# Patient Record
Sex: MALE | Race: OTHER | HISPANIC OR LATINO
[De-identification: names, ages, dates, MRNs, and addresses within clinical notes are randomized per-mention and may not be internally consistent; named-entity substitution may affect disease eponyms.]

---

## 2017-07-27 PROBLEM — E11.9 IDDM (INSULIN DEPENDENT DIABETES MELLITUS): Status: ACTIVE | Noted: 2017-07-27

## 2017-07-27 PROBLEM — I20.9 ANGINA, CLASS III: Status: ACTIVE | Noted: 2017-07-27

## 2017-08-02 ENCOUNTER — APPOINTMENT (OUTPATIENT)
Dept: CARDIOTHORACIC SURGERY | Facility: CLINIC | Age: 52
End: 2017-08-02
Payer: COMMERCIAL

## 2017-08-02 VITALS
OXYGEN SATURATION: 97 % | HEART RATE: 86 BPM | HEIGHT: 67 IN | WEIGHT: 250 LBS | DIASTOLIC BLOOD PRESSURE: 91 MMHG | RESPIRATION RATE: 14 BRPM | TEMPERATURE: 98.2 F | BODY MASS INDEX: 39.24 KG/M2 | SYSTOLIC BLOOD PRESSURE: 163 MMHG

## 2017-08-02 DIAGNOSIS — E78.5 HYPERLIPIDEMIA, UNSPECIFIED: ICD-10-CM

## 2017-08-02 DIAGNOSIS — R07.9 CHEST PAIN, UNSPECIFIED: ICD-10-CM

## 2017-08-02 DIAGNOSIS — Z79.4 TYPE 2 DIABETES MELLITUS W/OUT COMPLICATIONS: ICD-10-CM

## 2017-08-02 DIAGNOSIS — I20.9 ANGINA PECTORIS, UNSPECIFIED: ICD-10-CM

## 2017-08-02 DIAGNOSIS — E11.9 TYPE 2 DIABETES MELLITUS W/OUT COMPLICATIONS: ICD-10-CM

## 2017-08-02 DIAGNOSIS — E66.01 MORBID (SEVERE) OBESITY DUE TO EXCESS CALORIES: ICD-10-CM

## 2017-08-02 PROCEDURE — 99215 OFFICE O/P EST HI 40 MIN: CPT

## 2017-08-02 PROCEDURE — 94010 BREATHING CAPACITY TEST: CPT

## 2017-08-14 ENCOUNTER — OTHER (OUTPATIENT)
Age: 52
End: 2017-08-14

## 2017-08-18 ENCOUNTER — APPOINTMENT (OUTPATIENT)
Dept: CV DIAGNOSITCS | Facility: HOSPITAL | Age: 52
End: 2017-08-18

## 2017-08-18 ENCOUNTER — INPATIENT (INPATIENT)
Facility: HOSPITAL | Age: 52
LOS: 0 days | Discharge: LEFT BEFORE DISCHARGE | DRG: 287 | End: 2017-08-18
Attending: INTERNAL MEDICINE | Admitting: INTERNAL MEDICINE
Payer: MEDICAID

## 2017-08-18 VITALS
OXYGEN SATURATION: 97 % | HEART RATE: 96 BPM | HEIGHT: 67 IN | RESPIRATION RATE: 18 BRPM | SYSTOLIC BLOOD PRESSURE: 171 MMHG | DIASTOLIC BLOOD PRESSURE: 79 MMHG | WEIGHT: 250 LBS | TEMPERATURE: 100 F

## 2017-08-18 DIAGNOSIS — Z95.5 PRESENCE OF CORONARY ANGIOPLASTY IMPLANT AND GRAFT: Chronic | ICD-10-CM

## 2017-08-18 DIAGNOSIS — S42.309A UNSPECIFIED FRACTURE OF SHAFT OF HUMERUS, UNSPECIFIED ARM, INITIAL ENCOUNTER FOR CLOSED FRACTURE: Chronic | ICD-10-CM

## 2017-08-18 DIAGNOSIS — I25.10 ATHEROSCLEROTIC HEART DISEASE OF NATIVE CORONARY ARTERY WITHOUT ANGINA PECTORIS: ICD-10-CM

## 2017-08-18 LAB
ALBUMIN SERPL ELPH-MCNC: 4 G/DL — SIGNIFICANT CHANGE UP (ref 3.3–5)
ALP SERPL-CCNC: 136 U/L — HIGH (ref 40–120)
ALT FLD-CCNC: 19 U/L RC — SIGNIFICANT CHANGE UP (ref 10–45)
ANION GAP SERPL CALC-SCNC: 14 MMOL/L — SIGNIFICANT CHANGE UP (ref 5–17)
AST SERPL-CCNC: 12 U/L — SIGNIFICANT CHANGE UP (ref 10–40)
BILIRUB SERPL-MCNC: 0.3 MG/DL — SIGNIFICANT CHANGE UP (ref 0.2–1.2)
BUN SERPL-MCNC: 11 MG/DL — SIGNIFICANT CHANGE UP (ref 7–23)
CALCIUM SERPL-MCNC: 9.3 MG/DL — SIGNIFICANT CHANGE UP (ref 8.4–10.5)
CHLORIDE SERPL-SCNC: 93 MMOL/L — LOW (ref 96–108)
CO2 SERPL-SCNC: 25 MMOL/L — SIGNIFICANT CHANGE UP (ref 22–31)
CREAT SERPL-MCNC: 0.84 MG/DL — SIGNIFICANT CHANGE UP (ref 0.5–1.3)
GLUCOSE SERPL-MCNC: 505 MG/DL — CRITICAL HIGH (ref 70–99)
HBA1C BLD-MCNC: 12.2 % — HIGH (ref 4–5.6)
HCT VFR BLD CALC: 45 % — SIGNIFICANT CHANGE UP (ref 39–50)
HGB BLD-MCNC: 14.8 G/DL — SIGNIFICANT CHANGE UP (ref 13–17)
MCHC RBC-ENTMCNC: 29.7 PG — SIGNIFICANT CHANGE UP (ref 27–34)
MCHC RBC-ENTMCNC: 32.9 GM/DL — SIGNIFICANT CHANGE UP (ref 32–36)
MCV RBC AUTO: 90.3 FL — SIGNIFICANT CHANGE UP (ref 80–100)
PLATELET # BLD AUTO: 318 K/UL — SIGNIFICANT CHANGE UP (ref 150–400)
POTASSIUM SERPL-MCNC: 4.4 MMOL/L — SIGNIFICANT CHANGE UP (ref 3.5–5.3)
POTASSIUM SERPL-SCNC: 4.4 MMOL/L — SIGNIFICANT CHANGE UP (ref 3.5–5.3)
PROT SERPL-MCNC: 7.4 G/DL — SIGNIFICANT CHANGE UP (ref 6–8.3)
RBC # BLD: 4.98 M/UL — SIGNIFICANT CHANGE UP (ref 4.2–5.8)
RBC # FLD: 11.8 % — SIGNIFICANT CHANGE UP (ref 10.3–14.5)
SODIUM SERPL-SCNC: 132 MMOL/L — LOW (ref 135–145)
WBC # BLD: 12.6 K/UL — HIGH (ref 3.8–10.5)
WBC # FLD AUTO: 12.6 K/UL — HIGH (ref 3.8–10.5)

## 2017-08-18 PROCEDURE — 99152 MOD SED SAME PHYS/QHP 5/>YRS: CPT

## 2017-08-18 PROCEDURE — 93306 TTE W/DOPPLER COMPLETE: CPT | Mod: 26

## 2017-08-18 PROCEDURE — 93460 R&L HRT ART/VENTRICLE ANGIO: CPT | Mod: 26,GC

## 2017-08-18 PROCEDURE — 93010 ELECTROCARDIOGRAM REPORT: CPT

## 2017-08-18 RX ORDER — INSULIN LISPRO 100/ML
VIAL (ML) SUBCUTANEOUS
Qty: 0 | Refills: 0 | Status: DISCONTINUED | OUTPATIENT
Start: 2017-08-18 | End: 2017-08-18

## 2017-08-18 RX ORDER — DEXTROSE 50 % IN WATER 50 %
25 SYRINGE (ML) INTRAVENOUS ONCE
Qty: 0 | Refills: 0 | Status: DISCONTINUED | OUTPATIENT
Start: 2017-08-18 | End: 2017-08-18

## 2017-08-18 RX ORDER — INSULIN LISPRO 100/ML
VIAL (ML) SUBCUTANEOUS AT BEDTIME
Qty: 0 | Refills: 0 | Status: DISCONTINUED | OUTPATIENT
Start: 2017-08-18 | End: 2017-08-18

## 2017-08-18 RX ORDER — DEXTROSE 50 % IN WATER 50 %
12.5 SYRINGE (ML) INTRAVENOUS ONCE
Qty: 0 | Refills: 0 | Status: DISCONTINUED | OUTPATIENT
Start: 2017-08-18 | End: 2017-08-18

## 2017-08-18 RX ORDER — METFORMIN HYDROCHLORIDE 850 MG/1
1 TABLET ORAL
Qty: 0 | Refills: 0 | COMMUNITY

## 2017-08-18 RX ORDER — GLUCAGON INJECTION, SOLUTION 0.5 MG/.1ML
1 INJECTION, SOLUTION SUBCUTANEOUS ONCE
Qty: 0 | Refills: 0 | Status: DISCONTINUED | OUTPATIENT
Start: 2017-08-18 | End: 2017-08-18

## 2017-08-18 RX ORDER — SODIUM CHLORIDE 9 MG/ML
1000 INJECTION, SOLUTION INTRAVENOUS
Qty: 0 | Refills: 0 | Status: DISCONTINUED | OUTPATIENT
Start: 2017-08-18 | End: 2017-08-18

## 2017-08-18 RX ORDER — METOPROLOL TARTRATE 50 MG
50 TABLET ORAL DAILY
Qty: 0 | Refills: 0 | Status: DISCONTINUED | OUTPATIENT
Start: 2017-08-18 | End: 2017-08-18

## 2017-08-18 RX ORDER — DEXTROSE 50 % IN WATER 50 %
1 SYRINGE (ML) INTRAVENOUS ONCE
Qty: 0 | Refills: 0 | Status: DISCONTINUED | OUTPATIENT
Start: 2017-08-18 | End: 2017-08-18

## 2017-08-18 RX ORDER — INSULIN LISPRO 100/ML
5 VIAL (ML) SUBCUTANEOUS
Qty: 0 | Refills: 0 | Status: DISCONTINUED | OUTPATIENT
Start: 2017-08-18 | End: 2017-08-18

## 2017-08-18 RX ORDER — INSULIN GLARGINE 100 [IU]/ML
15 INJECTION, SOLUTION SUBCUTANEOUS AT BEDTIME
Qty: 0 | Refills: 0 | Status: DISCONTINUED | OUTPATIENT
Start: 2017-08-18 | End: 2017-08-18

## 2017-08-18 RX ORDER — SODIUM CHLORIDE 9 MG/ML
3 INJECTION INTRAMUSCULAR; INTRAVENOUS; SUBCUTANEOUS EVERY 8 HOURS
Qty: 0 | Refills: 0 | Status: DISCONTINUED | OUTPATIENT
Start: 2017-08-18 | End: 2017-08-18

## 2017-08-18 RX ORDER — ASPIRIN/CALCIUM CARB/MAGNESIUM 324 MG
325 TABLET ORAL DAILY
Qty: 0 | Refills: 0 | Status: DISCONTINUED | OUTPATIENT
Start: 2017-08-18 | End: 2017-08-18

## 2017-08-18 RX ORDER — ATORVASTATIN CALCIUM 80 MG/1
40 TABLET, FILM COATED ORAL AT BEDTIME
Qty: 0 | Refills: 0 | Status: DISCONTINUED | OUTPATIENT
Start: 2017-08-18 | End: 2017-08-18

## 2017-08-18 RX ADMIN — Medication: at 08:45

## 2017-08-18 NOTE — DISCHARGE NOTE ADULT - PLAN OF CARE
Low salt, low fat diet.   Weight management.   Take medications as prescribed.    No smoking.  Follow up appointments with your doctor(s)  as instructed. No heavy lifting for 2 weeks, no strenuous activity  ( pushing/ pulling) no driving for x 2 days,  you may shower 24 hours following procedure but no bathing or swimming for x1  week, no strenuous sex for x 1 week & follow up with your cardiologist in 1-2 week Your hemoglobin A1C will be at a normal range (normal range is from 6-8) Continue to follow with your primary care MD or your endocrinologist. Discuss what the goal hemoglobin A1C level is for you.  Follow a heart healthy diabetic diet. If you check your fingerstick glucose at home, call your MD if it is greater than 250mg/dL on 2 occasions or less than 100mg/dL on 2 occasions. Know signs of low blood sugar, such as: dizziness, shakiness, sweating, confusion, hunger, nervousness- drink 4 ounces apple juice if occurs and call your doctor. Know early signs of high blood sugar, such as: frequent urination, increased thirst, blurry vision, fatigue, headache - call your doctor if this occurs. Your LDL cholesterol will be less than 70mg/dL Continue with your cholesterol medications. Eat a heart healthy diet that is low in saturated fats and salt, and includes whole grains, fruits, vegetables and lean protein; exercise regularly (consult with your physician or cardiologist first); maintain a heart healthy weight; if you smoke - quit (A resource to help you stop smoking is the [facility] – phone number [phone].). Continue to follow with your primary physician or cardiologist. Your blood pressure will be controlled. Continue with your blood pressure medications; eat a heart healthy diet with low salt diet; exercise regularly (consult with your physician or cardiologist first); maintain a heart healthy weight; if you smoke - quit (A resource to help you stop smoking is the [facility] – phone number [phone].); include healthy ways to manage stress. Continue to follow with your primary care physician or cardiologist.

## 2017-08-18 NOTE — DISCHARGE NOTE ADULT - FINDINGS/TREATMENT
dLM 20%, mLAD 90%, pLAD stent patent, mLCx stent patent, dOM 70%, pRCA 80%  PA 36/18/20, PCW24/17/19, RA 10/5/5, RV 35/2/20

## 2017-08-18 NOTE — H&P CARDIOLOGY - HISTORY OF PRESENT ILLNESS
52 year old male with PMHx of CAD, MI x 1 (2012), stent x 3 (last 2014), HTN, HLD, DMT2(A1C 12.0; 2 weeks ago no endocrinologist), morbid obesity, [place] Palsy, presents for cardiac cath. Pt endorses he has been experiencing ARGUELLES with chest pressure for about 2 months, evvalauted by Dr. Nader Coulter, and referred to Dr. Sigala for CABG and here for cath prior to possible CABG. 52 year old male with PMHx of CAD, MI x 1 (2012), stent x 3 (LAD, LCX and RCA, 2012), HTN, HLD, DMT2(A1C 12.0; 2 weeks ago no endocrinologist), morbid obesity, [place] Palsy, presents for cardiac cath. Pt endorses he has been experiencing ARGUELLES with chest pressure for about 2 months, evvalauted by Dr. Nader Coulter, and referred to Dr. Sigala for CABG and here for cath prior to possible CABG.   Cath in 2014 showed EF 60%, oLAD 80%, mLCX 80%, patent RCA stent, [facility] consulted, declined for surgery at that time.

## 2017-08-18 NOTE — DISCHARGE NOTE ADULT - MEDICATION SUMMARY - MEDICATIONS TO TAKE
I will START or STAY ON the medications listed below when I get home from the hospital:    Aspirin Enteric Coated 325 mg oral delayed release tablet  -- 1 tab(s) by mouth once a day  -- Indication: For Heart Disease    lisinopril 40 mg oral tablet  -- 1 tab(s) by mouth once a day  -- Indication: For High Blood Pressure    metFORMIN 1000 mg oral tablet  -- 1 tab(s) by mouth 2 times a day - Hold for 2 days, you may resume on Monday 8/21  -- Indication: For Diabetes    glipiZIDE 10 mg oral tablet  -- 1 tab(s) by mouth once a day  -- Indication: For Diabetes    Januvia 100 mg oral tablet  -- 1 tab(s) by mouth once a day  -- Indication: For Diabetes    Lantus 100 units/mL subcutaneous solution  -- 15 unit(s) subcutaneous once a day (at bedtime)  -- Indication: For Diabetes    atorvastatin 20 mg oral tablet  -- 1 tab(s) by mouth once a day (at bedtime)  -- Indication: For High Cholesterol    Plavix 75 mg oral tablet  -- 1 tab(s) by mouth once a day  -- Indication: For Heart Disease    metoprolol succinate 50 mg oral tablet, extended release  -- 1 tab(s) by mouth once a day  -- Indication: For High Blood Pressure

## 2017-08-18 NOTE — H&P CARDIOLOGY - FAMILY HISTORY
Father  Still living? Yes, Estimated age: 65  Family history of diabetes mellitus, Age at diagnosis: Age Unknown     Grandparent  Still living? No  Family history of diabetes mellitus, Age at diagnosis: Age Unknown  Family history of heart disease, Age at diagnosis: Age Unknown

## 2017-08-18 NOTE — DISCHARGE NOTE ADULT - HOSPITAL COURSE
52 year old male with PMHx of CAD, MI x 1 (2012), stent x 3 (LAD, LCX and RCA, 2012), HTN, HLD, DMT2(A1C 12.0; 2 weeks ago no endocrinologist), morbid obesity, [place] Palsy, presents for cardiac cath. Pt endorses he has been experiencing ARGUELLES with chest pressure for about 2 months, evvalauted by Dr. Nader Coulter, and referred to Dr. Sigala for CABG and here for cath prior to possible CABG. Cath in 2014 showed EF 60%, oLAD 80%, mLCX 80%, patent RCA stent, [facility] consulted, declined for surgery at that time.  Patient is now s/p diagnostic R/L HC via RRA and R brachial venous access showing severe TVD.  Plan was to admit patient for CABG with Dr. Sigala but patient refusing to remain in hospital.  Risks, including death of CAD and elevated blood glucose discussed with patient by writer and Dr. Sigala.  Patient to sign out against medial advice.

## 2017-08-18 NOTE — H&P CARDIOLOGY - PMH
CAD (coronary artery disease)    Cornea conical, bilateral  wears corrective lenses  DM (diabetes mellitus)  Type II  HLD (hyperlipidemia)    HTN (hypertension)    MI (myocardial infarction)    Obesity  morbid  Stented coronary artery

## 2017-08-18 NOTE — DISCHARGE NOTE ADULT - CARE PROVIDER_API CALL
Radha Sigala), Thoracic and Cardiac Surgery  [address] [place] [address]  Phone: [phone]  Fax: [phone]

## 2017-08-18 NOTE — DISCHARGE NOTE ADULT - PATIENT PORTAL LINK FT
“You can access the FollowHealth Patient Portal, offered by [facility], by registering with the following website: [email or web address]”

## 2017-08-18 NOTE — DISCHARGE NOTE ADULT - CARE PROVIDERS DIRECT ADDRESSES
[email or web address]

## 2017-08-18 NOTE — DISCHARGE NOTE ADULT - CARE PLAN
Principal Discharge DX:	CAD (coronary artery disease)  Goal:	Low salt, low fat diet.   Weight management.   Take medications as prescribed.    No smoking.  Follow up appointments with your doctor(s)  as instructed.  Instructions for follow-up, activity and diet:	No heavy lifting for 2 weeks, no strenuous activity  ( pushing/ pulling) no driving for x 2 days,  you may shower 24 hours following procedure but no bathing or swimming for x1  week, no strenuous sex for x 1 week & follow up with your cardiologist in 1-2 week  Secondary Diagnosis:	DM (diabetes mellitus)  Goal:	Your hemoglobin A1C will be at a normal range (normal range is from 6-8)  Instructions for follow-up, activity and diet:	Continue to follow with your primary care MD or your endocrinologist. Discuss what the goal hemoglobin A1C level is for you.  Follow a heart healthy diabetic diet. If you check your fingerstick glucose at home, call your MD if it is greater than 250mg/dL on 2 occasions or less than 100mg/dL on 2 occasions. Know signs of low blood sugar, such as: dizziness, shakiness, sweating, confusion, hunger, nervousness- drink 4 ounces apple juice if occurs and call your doctor. Know early signs of high blood sugar, such as: frequent urination, increased thirst, blurry vision, fatigue, headache - call your doctor if this occurs.  Secondary Diagnosis:	Hyperlipidemia, unspecified hyperlipidemia type  Goal:	Your LDL cholesterol will be less than 70mg/dL  Instructions for follow-up, activity and diet:	Continue with your cholesterol medications. Eat a heart healthy diet that is low in saturated fats and salt, and includes whole grains, fruits, vegetables and lean protein; exercise regularly (consult with your physician or cardiologist first); maintain a heart healthy weight; if you smoke - quit (A resource to help you stop smoking is the [facility] – phone number [phone].). Continue to follow with your primary physician or cardiologist.  Secondary Diagnosis:	Essential hypertension  Goal:	Your blood pressure will be controlled.  Instructions for follow-up, activity and diet:	Continue with your blood pressure medications; eat a heart healthy diet with low salt diet; exercise regularly (consult with your physician or cardiologist first); maintain a heart healthy weight; if you smoke - quit (A resource to help you stop smoking is the [facility] – phone number [phone].); include healthy ways to manage stress. Continue to follow with your primary care physician or cardiologist.

## 2017-08-18 NOTE — H&P CARDIOLOGY - PSH
Fracture of arm  1987, radial fracture/operation/hardware  History of coronary artery stent placement  x3 (2012)

## 2017-08-25 ENCOUNTER — APPOINTMENT (OUTPATIENT)
Dept: ULTRASOUND IMAGING | Facility: HOSPITAL | Age: 52
End: 2017-08-25

## 2017-08-25 ENCOUNTER — OUTPATIENT (OUTPATIENT)
Dept: OUTPATIENT SERVICES | Facility: HOSPITAL | Age: 52
LOS: 1 days | End: 2017-08-25
Payer: MEDICAID

## 2017-08-25 VITALS
OXYGEN SATURATION: 98 % | WEIGHT: 255.96 LBS | HEART RATE: 80 BPM | DIASTOLIC BLOOD PRESSURE: 78 MMHG | HEIGHT: 67 IN | RESPIRATION RATE: 16 BRPM | SYSTOLIC BLOOD PRESSURE: 150 MMHG | TEMPERATURE: 99 F

## 2017-08-25 DIAGNOSIS — Z01.818 ENCOUNTER FOR OTHER PREPROCEDURAL EXAMINATION: ICD-10-CM

## 2017-08-25 DIAGNOSIS — S42.309A UNSPECIFIED FRACTURE OF SHAFT OF HUMERUS, UNSPECIFIED ARM, INITIAL ENCOUNTER FOR CLOSED FRACTURE: Chronic | ICD-10-CM

## 2017-08-25 DIAGNOSIS — G47.30 SLEEP APNEA, UNSPECIFIED: ICD-10-CM

## 2017-08-25 DIAGNOSIS — I25.10 ATHEROSCLEROTIC HEART DISEASE OF NATIVE CORONARY ARTERY WITHOUT ANGINA PECTORIS: ICD-10-CM

## 2017-08-25 DIAGNOSIS — E11.9 TYPE 2 DIABETES MELLITUS WITHOUT COMPLICATIONS: ICD-10-CM

## 2017-08-25 DIAGNOSIS — Z95.5 PRESENCE OF CORONARY ANGIOPLASTY IMPLANT AND GRAFT: Chronic | ICD-10-CM

## 2017-08-25 DIAGNOSIS — Z86.718 PERSONAL HISTORY OF OTHER VENOUS THROMBOSIS AND EMBOLISM: ICD-10-CM

## 2017-08-25 LAB
ANION GAP SERPL CALC-SCNC: 17 MMOL/L — SIGNIFICANT CHANGE UP (ref 5–17)
BLD GP AB SCN SERPL QL: NEGATIVE — SIGNIFICANT CHANGE UP
BUN SERPL-MCNC: 12 MG/DL — SIGNIFICANT CHANGE UP (ref 7–23)
CALCIUM SERPL-MCNC: 9.4 MG/DL — SIGNIFICANT CHANGE UP (ref 8.4–10.5)
CHLORIDE SERPL-SCNC: 96 MMOL/L — SIGNIFICANT CHANGE UP (ref 96–108)
CO2 SERPL-SCNC: 21 MMOL/L — LOW (ref 22–31)
CREAT SERPL-MCNC: 0.99 MG/DL — SIGNIFICANT CHANGE UP (ref 0.5–1.3)
GLUCOSE SERPL-MCNC: 293 MG/DL — HIGH (ref 70–99)
HCT VFR BLD CALC: 42.5 % — SIGNIFICANT CHANGE UP (ref 39–50)
HGB BLD-MCNC: 14 G/DL — SIGNIFICANT CHANGE UP (ref 13–17)
MCHC RBC-ENTMCNC: 28.2 PG — SIGNIFICANT CHANGE UP (ref 27–34)
MCHC RBC-ENTMCNC: 32.9 GM/DL — SIGNIFICANT CHANGE UP (ref 32–36)
MCV RBC AUTO: 85.7 FL — SIGNIFICANT CHANGE UP (ref 80–100)
MRSA PCR RESULT.: SIGNIFICANT CHANGE UP
PLATELET # BLD AUTO: 365 K/UL — SIGNIFICANT CHANGE UP (ref 150–400)
POTASSIUM SERPL-MCNC: 4.6 MMOL/L — SIGNIFICANT CHANGE UP (ref 3.5–5.3)
POTASSIUM SERPL-SCNC: 4.6 MMOL/L — SIGNIFICANT CHANGE UP (ref 3.5–5.3)
RBC # BLD: 4.96 M/UL — SIGNIFICANT CHANGE UP (ref 4.2–5.8)
RBC # FLD: 12.9 % — SIGNIFICANT CHANGE UP (ref 10.3–14.5)
RH IG SCN BLD-IMP: POSITIVE — SIGNIFICANT CHANGE UP
S AUREUS DNA NOSE QL NAA+PROBE: DETECTED
SODIUM SERPL-SCNC: 134 MMOL/L — LOW (ref 135–145)
WBC # BLD: 14.03 K/UL — HIGH (ref 3.8–10.5)
WBC # FLD AUTO: 14.03 K/UL — HIGH (ref 3.8–10.5)

## 2017-08-25 PROCEDURE — 80048 BASIC METABOLIC PNL TOTAL CA: CPT

## 2017-08-25 PROCEDURE — 93880 EXTRACRANIAL BILAT STUDY: CPT | Mod: 26

## 2017-08-25 PROCEDURE — 87640 STAPH A DNA AMP PROBE: CPT

## 2017-08-25 PROCEDURE — 86900 BLOOD TYPING SEROLOGIC ABO: CPT

## 2017-08-25 PROCEDURE — 93923 UPR/LXTR ART STDY 3+ LVLS: CPT

## 2017-08-25 PROCEDURE — 86850 RBC ANTIBODY SCREEN: CPT

## 2017-08-25 PROCEDURE — 71046 X-RAY EXAM CHEST 2 VIEWS: CPT

## 2017-08-25 PROCEDURE — 71020: CPT | Mod: 26

## 2017-08-25 PROCEDURE — 93880 EXTRACRANIAL BILAT STUDY: CPT

## 2017-08-25 PROCEDURE — 93923 UPR/LXTR ART STDY 3+ LVLS: CPT | Mod: 26

## 2017-08-25 PROCEDURE — 86901 BLOOD TYPING SEROLOGIC RH(D): CPT

## 2017-08-25 PROCEDURE — 87641 MR-STAPH DNA AMP PROBE: CPT

## 2017-08-25 PROCEDURE — 85027 COMPLETE CBC AUTOMATED: CPT

## 2017-08-25 PROCEDURE — G0463: CPT

## 2017-08-25 NOTE — H&P PST ADULT - CARDIOVASCULAR SYMPTOMS
claudication/chest pain/dyspnea on exertion CP on exertion only/chest pain/claudication/dyspnea on exertion

## 2017-08-25 NOTE — H&P PST ADULT - PSH
Fracture of arm  1987, radial fracture/operation/hardware left age 16  History of coronary artery stent placement  x3 (2012) Fracture of arm  1987, left radial fracture/operation/hardware  History of coronary artery stent placement  x3 (2012)

## 2017-08-25 NOTE — H&P PST ADULT - PROBLEM SELECTOR PLAN 2
JASON precautions   OR booking notified STOP BANG score 8, High risk   JASON precautions   OR booking notified

## 2017-08-25 NOTE — H&P PST ADULT - PMH
CAD (coronary artery disease)    Cornea conical, bilateral  wears corrective lenses  DM (diabetes mellitus)  Type II  HLD (hyperlipidemia)    HTN (hypertension)    MI (myocardial infarction)  2012  Obesity  morbid  Stented coronary artery Bilateral carotid artery disease    CAD (coronary artery disease)  severe  Cornea conical, bilateral  wears corrective lenses  DM (diabetes mellitus)  Type II, Hg A1C 12.2 ( 8/18/17) , does not check sugar at home regularly  History of Bell's palsy  2015  History of blood clots  s/p Bell's palsy, h/o bloot clot, patient reports being treated 2015  History of eczema    History of MI (myocardial infarction)  2012  HLD (hyperlipidemia)    HTN (hypertension)    Obesity  morbid, BMI 40  Sleep apnea  never evaluated, STOP BANG 8, high risk  Stented coronary artery  x 3 ( 2012)

## 2017-08-25 NOTE — H&P PST ADULT - HISTORY OF PRESENT ILLNESS
52 yr old obese AA male BMI 40, HTN, HLD, DM2. CAD with stents,  carotid stenosis, sleep apnea,  presents to PST for scheduled CABG x4 on 8/30/17. Patient reports intermittent CP, dizziness, ARGUELLES. Accompanied by wife.     cath 8/18/17 :   LM:   --  Distal left main: There was a 60 % stenosis.  LAD:   --  Mid LAD: There was a tubular 99 % stenosis.  --  D1: There was a tubular 95 % stenosis.  CX:   --  OM1: There was a 99 % stenosis.  RCA:   --  Mid RCA: There was a diffuse 95 % stenosis.  SUMMARY:  HEMODYNAMICS: Hemodynamic assessment demonstrates severely elevated LVEDP  and moderately to markedly elevated pulmonary capillary wedge pressure.    Echo 8/18/17  1. Aortic valve not well visualized; probably normal.  2. Mild concentric left ventricular hypertrophy.  3. Endocardial visualization enhanced with intravenous  injection of echo contrast (Definity). Grossly normal left  ventricular systolic function.  4. Moderate diastolic dysfunction (Stage II).  5. Normal right ventricular size and function.  6. Inadequate tricuspid regurgitation Doppler envelope  precludes estimation of RVSP.

## 2017-08-25 NOTE — H&P PST ADULT - FAMILY HISTORY
Grandparent  Still living? No  Family history of diabetes mellitus, Age at diagnosis: Age Unknown  Family history of heart disease, Age at diagnosis: Age Unknown     Father  Still living? Yes, Estimated age: 71-80  Family history of diabetes mellitus, Age at diagnosis: Age Unknown  Family history of heart disease, Age at diagnosis: Age Unknown

## 2017-08-25 NOTE — H&P PST ADULT - NSANTHOSAYNRD_GEN_A_CORE
No. JASON screening performed.  STOP BANG Legend: 0-2 = LOW Risk; 3-4 = INTERMEDIATE Risk; 5-8 = HIGH Risk/never tested never evaluated/No. JASON screening performed.  STOP BANG Legend: 0-2 = LOW Risk; 3-4 = INTERMEDIATE Risk; 5-8 = HIGH Risk

## 2017-08-28 ENCOUNTER — MEDICATION RENEWAL (OUTPATIENT)
Age: 52
End: 2017-08-28

## 2017-08-28 DIAGNOSIS — A49.01 METHICILLIN SUSCEPTIBLE STAPHYLOCOCCUS AUREUS INFECTION, UNSPECIFIED SITE: ICD-10-CM

## 2017-08-28 LAB
APPEARANCE: ABNORMAL
BILIRUBIN URINE: NEGATIVE
BLOOD URINE: NEGATIVE
COLOR: YELLOW
GLUCOSE QUALITATIVE U: 100 MG/DL
KETONES URINE: NEGATIVE
LEUKOCYTE ESTERASE URINE: NEGATIVE
NITRITE URINE: NEGATIVE
PH URINE: 5.5
PROTEIN URINE: ABNORMAL MG/DL
SPECIFIC GRAVITY URINE: 1.03
UROBILINOGEN URINE: 1 MG/DL

## 2017-08-29 LAB — BACTERIA UR CULT: NORMAL

## 2017-08-31 ENCOUNTER — LABORATORY RESULT (OUTPATIENT)
Age: 52
End: 2017-08-31

## 2017-08-31 ENCOUNTER — APPOINTMENT (OUTPATIENT)
Dept: CARDIOTHORACIC SURGERY | Facility: CLINIC | Age: 52
End: 2017-08-31
Payer: MEDICAID

## 2017-08-31 VITALS
TEMPERATURE: 98.6 F | HEART RATE: 80 BPM | BODY MASS INDEX: 39.55 KG/M2 | OXYGEN SATURATION: 96 % | DIASTOLIC BLOOD PRESSURE: 84 MMHG | RESPIRATION RATE: 14 BRPM | SYSTOLIC BLOOD PRESSURE: 143 MMHG | HEIGHT: 67 IN | WEIGHT: 252 LBS

## 2017-08-31 VITALS — DIASTOLIC BLOOD PRESSURE: 79 MMHG | SYSTOLIC BLOOD PRESSURE: 146 MMHG

## 2017-08-31 DIAGNOSIS — D72.829 ELEVATED WHITE BLOOD CELL COUNT, UNSPECIFIED: ICD-10-CM

## 2017-08-31 PROBLEM — Z95.5 PRESENCE OF CORONARY ANGIOPLASTY IMPLANT AND GRAFT: Chronic | Status: ACTIVE | Noted: 2017-08-18

## 2017-08-31 PROCEDURE — 99214 OFFICE O/P EST MOD 30 MIN: CPT

## 2017-09-02 LAB — BACTERIA BLD CULT: NORMAL

## 2017-09-05 ENCOUNTER — APPOINTMENT (OUTPATIENT)
Dept: CARDIOTHORACIC SURGERY | Facility: HOSPITAL | Age: 52
End: 2017-09-05

## 2017-09-08 ENCOUNTER — MESSAGE (OUTPATIENT)
Age: 52
End: 2017-09-08

## 2017-09-11 LAB — BACTERIA UR CULT: NORMAL

## 2017-09-13 LAB
ALBUMIN SERPL ELPH-MCNC: 4 G/DL
ALP BLD-CCNC: 82 U/L
ALT SERPL-CCNC: 22 U/L RC
ANION GAP SERPL CALC-SCNC: 14 MMOL/L
AST SERPL-CCNC: 15 U/L
BASOPHILS # BLD AUTO: 0.04 K/UL
BASOPHILS NFR BLD AUTO: 0.3 %
BILIRUB DIRECT SERPL-MCNC: 0.1 MG/DL
BILIRUB INDIRECT SERPL-MCNC: 0.4 MG/DL
BILIRUB SERPL-MCNC: 0.5 MG/DL
BUN SERPL-MCNC: 11 MG/DL
CALCIUM SERPL-MCNC: 9.9 MG/DL
CHLORIDE SERPL-SCNC: 98 MMOL/L
CO2 SERPL-SCNC: 26 MMOL/L
CREAT SERPL-MCNC: 0.7 MG/DL
EOSINOPHIL # BLD AUTO: 0.35 K/UL
EOSINOPHIL NFR BLD AUTO: 2.7 %
GLUCOSE SERPL-MCNC: 262 MG/DL
HCT VFR BLD CALC: 45 %
HGB BLD-MCNC: 15.2 G/DL
LYMPHOCYTES # BLD AUTO: 3.37 K/UL
LYMPHOCYTES NFR BLD AUTO: 25.9 %
MAN DIFF?: NO
MCHC RBC-ENTMCNC: 30.6 PG
MCHC RBC-ENTMCNC: 33.8 GM/DL
MCV RBC AUTO: 90.4 FL
MONOCYTES # BLD AUTO: 0.72 K/UL
MONOCYTES NFR BLD AUTO: 5.5 %
NEUTROPHILS # BLD AUTO: 8.56 K/UL
NEUTROPHILS NFR BLD AUTO: 65.6 %
PLATELET # BLD AUTO: 351 K/UL
POTASSIUM SERPL-SCNC: 4.6 MMOL/L
PROT SERPL-MCNC: 7.5 G/DL
RBC # BLD: 4.98 M/UL
RBC # FLD: 11.8 %
SODIUM SERPL-SCNC: 138 MMOL/L
WBC # FLD AUTO: 13 K/UL

## 2017-09-18 ENCOUNTER — OTHER (OUTPATIENT)
Age: 52
End: 2017-09-18

## 2017-09-27 ENCOUNTER — INPATIENT (INPATIENT)
Facility: HOSPITAL | Age: 52
LOS: 6 days | Discharge: HOME | DRG: 235 | End: 2017-10-04
Attending: STUDENT IN AN ORGANIZED HEALTH CARE EDUCATION/TRAINING PROGRAM | Admitting: STUDENT IN AN ORGANIZED HEALTH CARE EDUCATION/TRAINING PROGRAM
Payer: MEDICAID

## 2017-09-27 VITALS
TEMPERATURE: 98 F | HEART RATE: 83 BPM | DIASTOLIC BLOOD PRESSURE: 84 MMHG | OXYGEN SATURATION: 98 % | RESPIRATION RATE: 18 BRPM | SYSTOLIC BLOOD PRESSURE: 162 MMHG | WEIGHT: 248.02 LBS

## 2017-09-27 DIAGNOSIS — I25.10 ATHEROSCLEROTIC HEART DISEASE OF NATIVE CORONARY ARTERY WITHOUT ANGINA PECTORIS: ICD-10-CM

## 2017-09-27 DIAGNOSIS — Z95.5 PRESENCE OF CORONARY ANGIOPLASTY IMPLANT AND GRAFT: Chronic | ICD-10-CM

## 2017-09-27 DIAGNOSIS — S42.309A UNSPECIFIED FRACTURE OF SHAFT OF HUMERUS, UNSPECIFIED ARM, INITIAL ENCOUNTER FOR CLOSED FRACTURE: Chronic | ICD-10-CM

## 2017-09-27 DIAGNOSIS — D72.829 ELEVATED WHITE BLOOD CELL COUNT, UNSPECIFIED: ICD-10-CM

## 2017-09-27 LAB
ALBUMIN SERPL ELPH-MCNC: 4 G/DL — SIGNIFICANT CHANGE UP (ref 3.3–5)
ALP SERPL-CCNC: 79 U/L — SIGNIFICANT CHANGE UP (ref 40–120)
ALT FLD-CCNC: 18 U/L RC — SIGNIFICANT CHANGE UP (ref 10–45)
ANION GAP SERPL CALC-SCNC: 17 MMOL/L — SIGNIFICANT CHANGE UP (ref 5–17)
APPEARANCE UR: CLEAR — SIGNIFICANT CHANGE UP
APTT BLD: 29.5 SEC — SIGNIFICANT CHANGE UP (ref 27.5–37.4)
AST SERPL-CCNC: 15 U/L — SIGNIFICANT CHANGE UP (ref 10–40)
BILIRUB SERPL-MCNC: 0.3 MG/DL — SIGNIFICANT CHANGE UP (ref 0.2–1.2)
BILIRUB UR-MCNC: NEGATIVE — SIGNIFICANT CHANGE UP
BUN SERPL-MCNC: 11 MG/DL — SIGNIFICANT CHANGE UP (ref 7–23)
CALCIUM SERPL-MCNC: 9.3 MG/DL — SIGNIFICANT CHANGE UP (ref 8.4–10.5)
CHLORIDE SERPL-SCNC: 99 MMOL/L — SIGNIFICANT CHANGE UP (ref 96–108)
CO2 SERPL-SCNC: 22 MMOL/L — SIGNIFICANT CHANGE UP (ref 22–31)
COLOR SPEC: YELLOW — SIGNIFICANT CHANGE UP
CREAT SERPL-MCNC: 0.69 MG/DL — SIGNIFICANT CHANGE UP (ref 0.5–1.3)
DIFF PNL FLD: NEGATIVE — SIGNIFICANT CHANGE UP
GLUCOSE SERPL-MCNC: 192 MG/DL — HIGH (ref 70–99)
GLUCOSE UR QL: 1000
HBA1C BLD-MCNC: 10.6 % — HIGH (ref 4–5.6)
HCT VFR BLD CALC: 42.2 % — SIGNIFICANT CHANGE UP (ref 39–50)
HGB BLD-MCNC: 14.1 G/DL — SIGNIFICANT CHANGE UP (ref 13–17)
INR BLD: 1.12 RATIO — SIGNIFICANT CHANGE UP (ref 0.88–1.16)
KETONES UR-MCNC: ABNORMAL
LEUKOCYTE ESTERASE UR-ACNC: NEGATIVE — SIGNIFICANT CHANGE UP
MCHC RBC-ENTMCNC: 30.3 PG — SIGNIFICANT CHANGE UP (ref 27–34)
MCHC RBC-ENTMCNC: 33.5 GM/DL — SIGNIFICANT CHANGE UP (ref 32–36)
MCV RBC AUTO: 90.5 FL — SIGNIFICANT CHANGE UP (ref 80–100)
NITRITE UR-MCNC: NEGATIVE — SIGNIFICANT CHANGE UP
NT-PROBNP SERPL-SCNC: 185 PG/ML — SIGNIFICANT CHANGE UP (ref 0–300)
PA ADP PRP-ACNC: 222 PRU — SIGNIFICANT CHANGE UP (ref 194–417)
PH UR: 5.5 — SIGNIFICANT CHANGE UP (ref 5–8)
PLATELET # BLD AUTO: 306 K/UL — SIGNIFICANT CHANGE UP (ref 150–400)
POTASSIUM SERPL-MCNC: 4.3 MMOL/L — SIGNIFICANT CHANGE UP (ref 3.5–5.3)
POTASSIUM SERPL-SCNC: 4.3 MMOL/L — SIGNIFICANT CHANGE UP (ref 3.5–5.3)
PROT SERPL-MCNC: 7.5 G/DL — SIGNIFICANT CHANGE UP (ref 6–8.3)
PROT UR-MCNC: SIGNIFICANT CHANGE UP
PROTHROM AB SERPL-ACNC: 12.1 SEC — SIGNIFICANT CHANGE UP (ref 9.8–12.7)
RBC # BLD: 4.66 M/UL — SIGNIFICANT CHANGE UP (ref 4.2–5.8)
RBC # FLD: 11.8 % — SIGNIFICANT CHANGE UP (ref 10.3–14.5)
SODIUM SERPL-SCNC: 138 MMOL/L — SIGNIFICANT CHANGE UP (ref 135–145)
SP GR SPEC: 1.03 — HIGH (ref 1.01–1.02)
UROBILINOGEN FLD QL: NEGATIVE — SIGNIFICANT CHANGE UP
WBC # BLD: 15 K/UL — HIGH (ref 3.8–10.5)
WBC # FLD AUTO: 15 K/UL — HIGH (ref 3.8–10.5)

## 2017-09-27 RX ORDER — METOPROLOL TARTRATE 50 MG
50 TABLET ORAL DAILY
Qty: 0 | Refills: 0 | Status: DISCONTINUED | OUTPATIENT
Start: 2017-09-27 | End: 2017-09-29

## 2017-09-27 RX ORDER — LISINOPRIL 2.5 MG/1
40 TABLET ORAL DAILY
Qty: 0 | Refills: 0 | Status: DISCONTINUED | OUTPATIENT
Start: 2017-09-27 | End: 2017-09-28

## 2017-09-27 RX ORDER — ASPIRIN/CALCIUM CARB/MAGNESIUM 324 MG
325 TABLET ORAL DAILY
Qty: 0 | Refills: 0 | Status: DISCONTINUED | OUTPATIENT
Start: 2017-09-27 | End: 2017-09-29

## 2017-09-27 RX ORDER — MUPIROCIN 20 MG/G
1 OINTMENT TOPICAL
Qty: 0 | Refills: 0 | Status: DISCONTINUED | OUTPATIENT
Start: 2017-09-27 | End: 2017-09-29

## 2017-09-27 RX ORDER — INFLUENZA VIRUS VACCINE 15; 15; 15; 15 UG/.5ML; UG/.5ML; UG/.5ML; UG/.5ML
0.5 SUSPENSION INTRAMUSCULAR ONCE
Qty: 0 | Refills: 0 | Status: DISCONTINUED | OUTPATIENT
Start: 2017-09-27 | End: 2017-09-30

## 2017-09-27 RX ORDER — INSULIN GLARGINE 100 [IU]/ML
30 INJECTION, SOLUTION SUBCUTANEOUS AT BEDTIME
Qty: 0 | Refills: 0 | Status: DISCONTINUED | OUTPATIENT
Start: 2017-09-28 | End: 2017-09-28

## 2017-09-27 RX ORDER — DEXTROSE 50 % IN WATER 50 %
1 SYRINGE (ML) INTRAVENOUS ONCE
Qty: 0 | Refills: 0 | Status: DISCONTINUED | OUTPATIENT
Start: 2017-09-27 | End: 2017-09-29

## 2017-09-27 RX ORDER — DEXTROSE 50 % IN WATER 50 %
12.5 SYRINGE (ML) INTRAVENOUS ONCE
Qty: 0 | Refills: 0 | Status: DISCONTINUED | OUTPATIENT
Start: 2017-09-27 | End: 2017-09-29

## 2017-09-27 RX ORDER — HEPARIN SODIUM 5000 [USP'U]/ML
1600 INJECTION INTRAVENOUS; SUBCUTANEOUS
Qty: 25000 | Refills: 0 | Status: DISCONTINUED | OUTPATIENT
Start: 2017-09-27 | End: 2017-09-28

## 2017-09-27 RX ORDER — ATORVASTATIN CALCIUM 80 MG/1
40 TABLET, FILM COATED ORAL AT BEDTIME
Qty: 0 | Refills: 0 | Status: DISCONTINUED | OUTPATIENT
Start: 2017-09-27 | End: 2017-09-29

## 2017-09-27 RX ORDER — INSULIN LISPRO 100/ML
VIAL (ML) SUBCUTANEOUS
Qty: 0 | Refills: 0 | Status: DISCONTINUED | OUTPATIENT
Start: 2017-09-27 | End: 2017-09-28

## 2017-09-27 RX ADMIN — HEPARIN SODIUM 16 UNIT(S)/HR: 5000 INJECTION INTRAVENOUS; SUBCUTANEOUS at 19:12

## 2017-09-27 RX ADMIN — Medication 4: at 21:54

## 2017-09-27 RX ADMIN — MUPIROCIN 1 APPLICATION(S): 20 OINTMENT TOPICAL at 21:53

## 2017-09-27 RX ADMIN — ATORVASTATIN CALCIUM 40 MILLIGRAM(S): 80 TABLET, FILM COATED ORAL at 21:53

## 2017-09-27 NOTE — H&P ADULT - NSHPREVIEWOFSYSTEMS_GEN_ALL_CORE
GENERAL SYMPTOMS: No weakness, fevers or chills  ENT: No visual changes;  No vertigo or throat pain   SKIN/BREAST: Negative  NECK: No pain or stiffness  RESPIRATORY/THORAX: No cough, wheezing, hemoptysis; No shortness of breath  CARDIOVASCULAR: chest pain on exertion  GASTROINTESTINAL:  obese No abdominal or epigastric pain. No nausea, vomiting, or hematemesis; No diarrhea or constipation.   GENITOURINARY: No dysuria, frequency or hematuria  NEUROLOGICAL: No numbness or weakness  MUSCULOSKELETAL equal strength throughout  SKIN: No itching, burning, rashes, or lesions   All other review of systems is negative unless indicated above.  HEMATOLOGY/LYMPHATICS: Negative  ENDOCRINE: diabetic  AIC 12.2

## 2017-09-27 NOTE — H&P ADULT - PSH
Fracture of arm  1987, left radial fracture/operation/hardware  History of coronary artery stent placement  x3 (2012)

## 2017-09-27 NOTE — H&P ADULT - HISTORY OF PRESENT ILLNESS
This is a  53 y/o  male PMH CAD,HTN,DM2 HLD, found to have multi vessel disease s/p  cardiac cath 8/18/17  preoperative lab workup revealed leukocytosis- UA negative  blood cx drawn and Urine culture sent. Patient directly admitted to [address] [place] [address] placed on heparin gtt. Last dose plavix was on plavix last dose 9/24.  Endocrine consulted ID consulted., scheduled for CABG  9/29

## 2017-09-27 NOTE — H&P ADULT - NSHPPHYSICALEXAM_GEN_ALL_CORE
Constitutional:  Obese Well developed ,w  Eyes: EOMI,PERRL  ENMT: WDL  Neck: no bruits ,no thyromegaly  Breasts :not examined  Back: no deformities no kyphosis  Respiratory: Breath  sounds equal& clear throughout  Cardiovascular:  S1 S2 RRR regular rate and rhythm no murmurs rubs  Gastrointestinal: obese Soft nontender positive bowels sounds   Genitourinary: not examined  Rectal: not examined  Extremities: + pedal pulse no edema  Vascular:Equal and normal throughout  Neurological: Alert and oriented no focal deficits  Skin: normal no rashes   Lymph Nodes: non tender   Musculoskeletal: equal  strength throughout

## 2017-09-27 NOTE — H&P ADULT - PMH
Bilateral carotid artery disease    CAD (coronary artery disease)  severe  Cornea conical, bilateral  wears corrective lenses  DM (diabetes mellitus)  Type II, Hg A1C 12.2 ( 8/18/17) , does not check sugar at home regularly  History of Bell's palsy  2015  History of blood clots  s/p Bell's palsy, h/o bloot clot, patient reports being treated 2015  History of eczema    History of MI (myocardial infarction)  2012  HLD (hyperlipidemia)    HTN (hypertension)    Obesity  morbid, BMI 40  Sleep apnea  never evaluated, STOP BANG 8, high risk  Stented coronary artery  x 3 ( 2012)

## 2017-09-27 NOTE — H&P ADULT - NSHPLABSRESULTS_GEN_ALL_CORE
from: Cardiac Cath Lab - Adult (08.18.17 @ 10:38) >  LM:   --  Distal left main: There was a 60 % stenosis.  LAD:   --  Mid LAD: There was a tubular 99 % stenosis.  --  D1: There was a tubular 95 % stenosis.  CX:   --  OM1: There was a 99 % stenosis.  RCA:   --  Mid RCA: There was a diffuse 95 % stenosis    < from: TTE with Doppler (w/Cont) (08.18.17 @ 08:26) >  Ef 67%    . Aortic valve not well visualized; probably normal.  2. Mild concentric left ventricular hypertrophy.  3. Endocardial visualization enhanced with intravenous  injection of echo contrast (Definity). Grossly normal left  ventricular systolic function.  4. Moderate diastolic dysfunction (Stage II).  5. Normal right ventricular size and function.  6. Inadequate tricuspid regurgitation Doppler envelope  precludes estimation of RVSP.

## 2017-09-28 DIAGNOSIS — E78.5 HYPERLIPIDEMIA, UNSPECIFIED: ICD-10-CM

## 2017-09-28 DIAGNOSIS — I10 ESSENTIAL (PRIMARY) HYPERTENSION: ICD-10-CM

## 2017-09-28 LAB
ANION GAP SERPL CALC-SCNC: 15 MMOL/L — SIGNIFICANT CHANGE UP (ref 5–17)
APTT BLD: 60.6 SEC — HIGH (ref 27.5–37.4)
APTT BLD: 67.3 SEC — HIGH (ref 27.5–37.4)
BLD GP AB SCN SERPL QL: NEGATIVE — SIGNIFICANT CHANGE UP
BUN SERPL-MCNC: 12 MG/DL — SIGNIFICANT CHANGE UP (ref 7–23)
CALCIUM SERPL-MCNC: 8.7 MG/DL — SIGNIFICANT CHANGE UP (ref 8.4–10.5)
CHLORIDE SERPL-SCNC: 98 MMOL/L — SIGNIFICANT CHANGE UP (ref 96–108)
CO2 SERPL-SCNC: 24 MMOL/L — SIGNIFICANT CHANGE UP (ref 22–31)
CREAT SERPL-MCNC: 0.68 MG/DL — SIGNIFICANT CHANGE UP (ref 0.5–1.3)
GLUCOSE SERPL-MCNC: 242 MG/DL — HIGH (ref 70–99)
HCT VFR BLD CALC: 40.8 % — SIGNIFICANT CHANGE UP (ref 39–50)
HGB BLD-MCNC: 13.6 G/DL — SIGNIFICANT CHANGE UP (ref 13–17)
MCHC RBC-ENTMCNC: 30.1 PG — SIGNIFICANT CHANGE UP (ref 27–34)
MCHC RBC-ENTMCNC: 33.3 GM/DL — SIGNIFICANT CHANGE UP (ref 32–36)
MCV RBC AUTO: 90.4 FL — SIGNIFICANT CHANGE UP (ref 80–100)
PLATELET # BLD AUTO: 279 K/UL — SIGNIFICANT CHANGE UP (ref 150–400)
POTASSIUM SERPL-MCNC: 3.9 MMOL/L — SIGNIFICANT CHANGE UP (ref 3.5–5.3)
POTASSIUM SERPL-SCNC: 3.9 MMOL/L — SIGNIFICANT CHANGE UP (ref 3.5–5.3)
RBC # BLD: 4.51 M/UL — SIGNIFICANT CHANGE UP (ref 4.2–5.8)
RBC # FLD: 11.8 % — SIGNIFICANT CHANGE UP (ref 10.3–14.5)
RH IG SCN BLD-IMP: POSITIVE — SIGNIFICANT CHANGE UP
SODIUM SERPL-SCNC: 137 MMOL/L — SIGNIFICANT CHANGE UP (ref 135–145)
TSH SERPL-MCNC: 0.56 UIU/ML — SIGNIFICANT CHANGE UP (ref 0.27–4.2)
WBC # BLD: 13.6 K/UL — HIGH (ref 3.8–10.5)
WBC # FLD AUTO: 13.6 K/UL — HIGH (ref 3.8–10.5)

## 2017-09-28 PROCEDURE — 71010: CPT | Mod: 26

## 2017-09-28 PROCEDURE — 93010 ELECTROCARDIOGRAM REPORT: CPT

## 2017-09-28 RX ORDER — CHLORHEXIDINE GLUCONATE 213 G/1000ML
1 SOLUTION TOPICAL DAILY
Qty: 0 | Refills: 0 | Status: DISCONTINUED | OUTPATIENT
Start: 2017-09-28 | End: 2017-09-29

## 2017-09-28 RX ORDER — INSULIN GLARGINE 100 [IU]/ML
15 INJECTION, SOLUTION SUBCUTANEOUS AT BEDTIME
Qty: 0 | Refills: 0 | Status: DISCONTINUED | OUTPATIENT
Start: 2017-09-28 | End: 2017-09-29

## 2017-09-28 RX ORDER — CHLORHEXIDINE GLUCONATE 213 G/1000ML
15 SOLUTION TOPICAL DAILY
Qty: 0 | Refills: 0 | Status: COMPLETED | OUTPATIENT
Start: 2017-09-28 | End: 2017-09-28

## 2017-09-28 RX ORDER — HEPARIN SODIUM 5000 [USP'U]/ML
1700 INJECTION INTRAVENOUS; SUBCUTANEOUS
Qty: 25000 | Refills: 0 | Status: DISCONTINUED | OUTPATIENT
Start: 2017-09-28 | End: 2017-09-29

## 2017-09-28 RX ORDER — INSULIN HUMAN 100 [IU]/ML
3 INJECTION, SOLUTION SUBCUTANEOUS
Qty: 100 | Refills: 0 | Status: DISCONTINUED | OUTPATIENT
Start: 2017-09-28 | End: 2017-09-29

## 2017-09-28 RX ORDER — CEFUROXIME AXETIL 250 MG
1500 TABLET ORAL ONCE
Qty: 0 | Refills: 0 | Status: DISCONTINUED | OUTPATIENT
Start: 2017-09-28 | End: 2017-09-29

## 2017-09-28 RX ADMIN — CHLORHEXIDINE GLUCONATE 1 APPLICATION(S): 213 SOLUTION TOPICAL at 23:19

## 2017-09-28 RX ADMIN — Medication 4: at 17:24

## 2017-09-28 RX ADMIN — MUPIROCIN 1 APPLICATION(S): 20 OINTMENT TOPICAL at 22:12

## 2017-09-28 RX ADMIN — MUPIROCIN 1 APPLICATION(S): 20 OINTMENT TOPICAL at 05:27

## 2017-09-28 RX ADMIN — Medication 325 MILLIGRAM(S): at 12:01

## 2017-09-28 RX ADMIN — Medication 2: at 08:06

## 2017-09-28 RX ADMIN — Medication 50 MILLIGRAM(S): at 05:27

## 2017-09-28 RX ADMIN — ATORVASTATIN CALCIUM 40 MILLIGRAM(S): 80 TABLET, FILM COATED ORAL at 22:11

## 2017-09-28 RX ADMIN — LISINOPRIL 40 MILLIGRAM(S): 2.5 TABLET ORAL at 05:27

## 2017-09-28 RX ADMIN — HEPARIN SODIUM 16.5 UNIT(S)/HR: 5000 INJECTION INTRAVENOUS; SUBCUTANEOUS at 03:05

## 2017-09-28 RX ADMIN — HEPARIN SODIUM 17 UNIT(S)/HR: 5000 INJECTION INTRAVENOUS; SUBCUTANEOUS at 14:47

## 2017-09-28 RX ADMIN — CHLORHEXIDINE GLUCONATE 15 MILLILITER(S): 213 SOLUTION TOPICAL at 21:28

## 2017-09-28 RX ADMIN — Medication 4: at 12:01

## 2017-09-28 RX ADMIN — INSULIN HUMAN 3 UNIT(S)/HR: 100 INJECTION, SOLUTION SUBCUTANEOUS at 23:18

## 2017-09-28 RX ADMIN — INSULIN GLARGINE 15 UNIT(S): 100 INJECTION, SOLUTION SUBCUTANEOUS at 22:11

## 2017-09-28 NOTE — CONSULT NOTE ADULT - ASSESSMENT
Pt for cabg tomorrow  no signs or symptoms of infection    . MSSA surveillance culture is negative but pt on bactroban   WBC noted but I dont think this reflects infection  no contraindication to surgery,

## 2017-09-28 NOTE — CONSULT NOTE ADULT - SUBJECTIVE AND OBJECTIVE BOX
HPI:  This is a  53 y/o  male PMH CAD,HTN,DM2 HLD, found to have multi vessel disease s/p  cardiac cath 8/18/17  preoperative lab workup revealed leukocytosis- UA negative  blood cx drawn and Urine culture sent. Patient directly admitted to [address] [place] [address] placed on heparin gtt. Last dose plavix was on plavix last dose 9/24.  Endocrine consulted ID consulted., scheduled for CABG  9/29 (27 Sep 2017 17:58)  Patient has history of diabetes, on oral meds at home,  no recent hypoglycemic episodes, no polyuria polydipsia. Patient follows up with PCP for diabetes management.    PAST MEDICAL & SURGICAL HISTORY:  History of eczema  History of blood clots: s/p Bell&#x27;s palsy, h/o bloot clot, patient reports being treated 2015  History of Bell's palsy: 2015  Sleep apnea: never evaluated, STOP BANG 8, high risk  Bilateral carotid artery disease  History of MI (myocardial infarction): 2012  Stented coronary artery: x 3 ( 2012)  Cornea conical, bilateral: wears corrective lenses  Obesity: morbid, BMI 40  CAD (coronary artery disease): severe  DM (diabetes mellitus): Type II, Hg A1C 12.2 ( 8/18/17) , does not check sugar at home regularly  HLD (hyperlipidemia)  HTN (hypertension)  History of coronary artery stent placement: x3 (2012)  Fracture of arm: 1987, left radial fracture/operation/hardware      FAMILY HISTORY:  Family history of heart disease (Grandparent, Father)  Family history of diabetes mellitus (Grandparent, Father)      Social History:    Outpatient Medications:    MEDICATIONS  (STANDING):  dextrose 50% Injectable 12.5 Gram(s) IV Push once  aspirin enteric coated 325 milliGRAM(s) Oral daily  atorvastatin 40 milliGRAM(s) Oral at bedtime  metoprolol succinate ER 50 milliGRAM(s) Oral daily  influenza   Vaccine 0.5 milliLiter(s) IntraMuscular once  mupirocin 2% Ointment 1 Application(s) Topical two times a day  chlorhexidine 4% Liquid 1 Application(s) Topical daily  cefuroxime  IVPB 1500 milliGRAM(s) IV Intermittent once  insulin glargine Injectable (LANTUS) 15 Unit(s) SubCutaneous at bedtime  heparin  Infusion 1700 Unit(s)/Hr (17 mL/Hr) IV Continuous <Continuous>  insulin Infusion 3 Unit(s)/Hr (3 mL/Hr) IV Continuous <Continuous>    MEDICATIONS  (PRN):  dextrose Gel 1 Dose(s) Oral once PRN Blood Glucose LESS THAN 70 milliGRAM(s)/deciLiter      Allergies    No Known Allergies    Intolerances      Review of Systems:  Constitutional: No fever, no chills  Eyes: No blurry vision  Neuro: No tremors  HEENT: No pain, no neck swelling  Cardiovascular: No chest pain, no palpitations  Respiratory: Has SOB, no cough  GI: No nausea, vomiting, abdominal pain  : No dysuria  Skin: no rash  MSK: Has leg swelling, no foot ulcers.  Psych: no depression  Endocrine: no polyuria, polydipsia    ALL OTHER SYSTEMS REVIEWED AND NEGATIVE    UNABLE TO OBTAIN    PHYSICAL EXAM:  VITALS: T(C): 37.2 (09-28-17 @ 20:18)  T(F): 98.9 (09-28-17 @ 20:18), Max: 98.9 (09-28-17 @ 20:18)  HR: 75 (09-28-17 @ 20:18) (71 - 78)  BP: 154/80 (09-28-17 @ 20:18) (144/88 - 158/87)  RR:  (18 - 18)  SpO2:  (95% - 96%)  Wt(kg): --  GENERAL: NAD, well-groomed, well-developed  EYES: No proptosis, no lid lag  HEENT:  Atraumatic, Normocephalic  THYROID: Normal size, no palpable nodules  RESPIRATORY: Clear to auscultation bilaterally; No rales, rhonchi, wheezing  CARDIOVASCULAR: Si S2, No murmurs;  GI: Soft, non distended, normal bowel sounds  SKIN: Dry, intact, No rashes or lesions  MUSCULOSKELETAL: Has BL lower extremity edema.  NEURO:  no tremor, sensation decreased in feet BL,    CAPILLARY BLOOD GLUCOSE  254 (09-28 @ 22:04)  207 (09-28 @ 16:20)  225 (09-28 @ 11:08)  176 (09-28 @ 07:19)  210 (09-27 @ 21:40)  186 (09-27 @ 18:07)                            13.6   13.6  )-----------( 279      ( 28 Sep 2017 01:48 )             40.8       09-28    137  |  98  |  12  ----------------------------<  242<H>  3.9   |  24  |  0.68    EGFR if : 129  EGFR if non : 111    Ca    8.7      09-28    TPro  7.5  /  Alb  4.0  /  TBili  0.3  /  DBili  x   /  AST  15  /  ALT  18  /  AlkPhos  79  09-27      Thyroid Function Tests:  09-27 @ 23:53 TSH 0.56 FreeT4 -- T3 -- Anti TPO -- Anti Thyroglobulin Ab -- TSI --      Hemoglobin A1C, Whole Blood: 10.6 % <H> [4.0 - 5.6] (09-27-17 @ 21:43)  Hemoglobin A1C, Whole Blood: 12.2 % <H> [4.0 - 5.6] (08-18-17 @ 08:58)          Radiology:

## 2017-09-28 NOTE — PROGRESS NOTE ADULT - SUBJECTIVE AND OBJECTIVE BOX
Cardiac Surgery Pre-op Note:    CC: Patient is a 52y old  Male who presents with a chief complaint of Chest pain admitted 17      Referring Physician:                                                                                                           Surgeon: Dr Sigala    Procedure: CABG    Allergies    No Known Allergies    Intolerances    HPI:  This is a  51 y/o  male PMH CAD,HTN,DM2 HLD, found to have multi vessel disease s/p  cardiac cath 17  preoperative lab workup revealed leukocytosis- UA negative  blood cx drawn and Urine culture sent. Patient directly admitted to [address] [place] [address] placed on heparin gtt. Last dose was on plavix last dose .  Endocrine consulted ID consulted., scheduled for CABG  17 Seen and examined by Dr Drake cleared for surgery  PAST MEDICAL & SURGICAL HISTORY:  History of eczema  History of blood clots: s/p Bell&#x27;s palsy, h/o bloot clot, patient reports being treated   History of Bell's palsy:   Sleep apnea: never evaluated, STOP BANG 8, high risk  Bilateral carotid artery disease  History of MI (myocardial infarction):   Stented coronary artery: x 3 ( )  Cornea conical, bilateral: wears corrective lenses  Obesity: morbid, BMI 40  CAD (coronary artery disease): severe  DM (diabetes mellitus): Type II, Hg A1C 12.2 ( 17) , does not check sugar at home regularly  HLD (hyperlipidemia)  HTN (hypertension)  History of coronary artery stent placement: x3 ()  Fracture of arm: , left radial fracture/operation/hardware      MEDICATIONS  (STANDING):  heparin  Infusion 1600 Unit(s)/Hr (16.5 mL/Hr) IV Continuous <Continuous>  insulin lispro (HumaLOG) corrective regimen sliding scale   SubCutaneous Before meals and at bedtime  aspirin enteric coated 325 milliGRAM(s) Oral daily  atorvastatin 40 milliGRAM(s) Oral at bedtime  metoprolol succinate ER 50 milliGRAM(s) Oral daily  influenza   Vaccine 0.5 milliLiter(s) IntraMuscular once  mupirocin 2% Ointment 1 Application(s) Topical two times a day  chlorhexidine 4% Liquid 1 Application(s) Topical daily  chlorhexidine 0.12% Liquid 15 milliLiter(s) Swish and Spit daily  cefuroxime  IVPB 1500 milliGRAM(s) IV Intermittent once  insulin glargine Injectable (LANTUS) 15 Unit(s) SubCutaneous at bedtime  148/88 71 18 96 36  Daily Weight in k.5 (27 Sep 2017 18:07)  height 170.2    Labs:                        13.6   13.6  )-----------( 279      ( 28 Sep 2017 01:48 )             40.8         137  |  98  |  12  ----------------------------<  242<H>  3.9   |  24  |  0.68    Ca    8.7      28 Sep 2017 01:48    TPro  7.5  /  Alb  4.0  /  TBili  0.3  /  DBili  x   /  AST  15  /  ALT  18  /  AlkPhos  79      PT/INR - ( 27 Sep 2017 18:23 )   PT: 12.1 sec;   INR: 1.12 ratio         PTT - ( 28 Sep 2017 01:48 )  PTT:60.6 sec    Blood Type:   HGB A1C: Hemoglobin A1C, Whole Blood: 10.6 % ( @ 21:43)    Prealbumin:   Pro-BNP: Serum Pro-Brain Natriuretic Peptide: 185 pg/mL ( @ 18:23)    Thyroid Panel:  @ 23:53/0.56  --/--/--    MRSA:  / MSSA:   Urinalysis Basic - ( 27 Sep 2017 19:34 )    Color: Yellow / Appearance: Clear / S.029 / pH: x  Gluc: x / Ketone: Trace  / Bili: Negative / Urobili: Negative   Blood: x / Protein: Trace / Nitrite: Negative   Leuk Esterase: Negative / RBC: x / WBC x   Sq Epi: x / Non Sq Epi: x / Bacteria: x        CXR:  1.  The lungs are clear.  EKG   NORMAL SINUS RHYTHM  ABNORMAL QRS-T ANGLE, CONSIDER PRIMARY T WAVE ABNORMALITY  ABNORMAL ECG    Carotid Duplex:   Findings consistent with approximately 50-69% stenosis of the   proximal left internal carotid artery. No evidence of significant   stenosis of the right internal carotid artery. There are stenoses of both   external carotid arteries.    PFT's:    Echocardiogram:  . Aortic valve not well visualized; probably normal.  2. Mild concentric left ventricular hypertrophy.  3. Endocardial visualization enhanced with intravenous  injection of echo contrast (Definity). Grossly normal left  ventricular systolic function.  4. Moderate diastolic dysfunction (Stage II).  5. Normal right ventricular size and function.  6. Inadequate tricuspid regurgitation Doppler envelope  precludes estimation of RVSP.      Cardiac catheterization:< from: Cardiac Cath Lab - Adult (17 @ 10:38) >  LM:   --  Distal left main: There was a 60 % stenosis.  LAD:   --  Mid LAD: There was a tubular 99 % stenosis.  --  D1: There was a tubular 95 % stenosis.  CX:   --  OM1: There was a 99 % stenosis.  RCA:   --  Mid RCA: There was a diffuse 95 % stenosis.    Gen: WN/WD NAD  Neuro: AAOx3, nonfocal  Pulm: CTA B/L  CV: RRR, S1S2  Abd: Obese Soft, NT, ND +BS  Ext: No edema, + peripheral pulses      Pt has AICD/PPM [ ] Yes  [ x] No             Brand Name:  Pre-op Beta Blocker ordered within 24 hrs of surgery (CABG ONLY)?  [x ] Yes  [ ] No  If not, Why?  Type & Cross  [ x] Yes  [ ] No  NPO after Midnight [ ] Yes  [ ] No  Pre-op ABX ordered, to be taped on chart:  [ x] Yes  [ ] No     Hibiclens/Peridex ordered [x ] Yes  [ ] No  Intraop on Hold: PRBCs, CXR, LUIS CARLOS [x ]   Consent obtained  [ x] Yes  [ ] No

## 2017-09-28 NOTE — CONSULT NOTE ADULT - ASSESSMENT
Assessment  DMT2: 52y Male with DM T2 with hyperglycemia on insulin, with neuropathy, with nephropathy, blood sugars running high,no hypoglycemia, NPO for sx in am,  non compliant with low carb diet.  HTN: Controlled, On med.  HLD:  On meds.  CAD:On meds, monitoered.

## 2017-09-28 NOTE — CONSULT NOTE ADULT - SUBJECTIVE AND OBJECTIVE BOX
Patient is a 52y old  Male who presents with a chief complaint of Chest pain (27 Sep 2017 17:58)    HPI:  This is a  53 y/o  male PMH CAD,HTN,DM2 HLD, found to have multi vessel disease s/p  cardiac cath 8/18/17  preoperative lab workup revealed leukocytosis- UA negative  blood cx drawn and Urine culture sent. Patient directly admitted to [facility] placed on heparin gtt. Last dose plavix was on plavix last dose 9/24.  denies any symptoms  no dysuria rash       PAST MEDICAL & SURGICAL HISTORY:  History of eczema  History of blood clots: s/p Bell&#x27;s palsy, h/o bloot clot, patient reports being treated 2015  History of Bell's palsy: 2015  Sleep apnea: never evaluated, STOP BANG 8, high risk  Bilateral carotid artery disease  History of MI (myocardial infarction): 2012  Stented coronary artery: x 3 ( 2012)  Cornea conical, bilateral: wears corrective lenses  Obesity: morbid, BMI 40  CAD (coronary artery disease): severe  DM (diabetes mellitus): Type II, Hg A1C 12.2 ( 8/18/17) , does not check sugar at home regularly  HLD (hyperlipidemia)  HTN (hypertension)  History of coronary artery stent placement: x3 (2012)  Fracture of arm: 1987, left radial fracture/operation/hardware      Social history:    FAMILY HISTORY:  Family history of heart disease (Grandparent, Father)  Family history of diabetes mellitus (Grandparent, Father)    REVIEW OF SYSTEMS  General:	Denies any malaise fatigue or chills. Fevers absent    Skin:No rash  	  Ophthalmologic:Denies any visual complaints,discharge redness or photophobia  	  ENMT:No nasal discharge,headache,sinus congestion or throat pain.No dental complaints    Respiratory and Thorax:No cough,sputum or chest pain.Denies shortness of breath  	  Cardiovascular:	No chest pain,palpitaions or dizziness    Gastrointestinal:	NO nausea,abdominal pain or diarrhea.    Genitourinary:	No dysuria,frequency. No flank pain    Musculoskeletal:	No joint swelling or pain.No weakness    Neurological:No confusion,diziness.No extremity weakness.No bladder or bowel incontinence	      Hematology/Lymphatics:	No LN swelling.No gum bleeding     Endocrine:	No recent weight gain or loss.No abnormal heat/cold intolerance    Allergic/Immunologic:	No hives or rash   Allergies    No Known Allergies    Intolerances        Antimicrobials:          Vital Signs Last 24 Hrs  T(C): 36.6 (28 Sep 2017 05:01), Max: 37 (27 Sep 2017 19:55)  T(F): 97.8 (28 Sep 2017 05:01), Max: 98.6 (27 Sep 2017 19:55)  HR: 71 (28 Sep 2017 05:01) (71 - 92)  BP: 144/88 (28 Sep 2017 05:01) (143/83 - 176/92)  BP(mean): 110 (27 Sep 2017 18:07) (110 - 110)  RR: 18 (28 Sep 2017 05:01) (18 - 18)  SpO2: 96% (28 Sep 2017 05:01) (93% - 98%)    PHYSICAL EXAM:Pleasant patient in no acute distress.      Constitutional:Comfortable.Awake and alert  No cachexia     Eyes:PERRL EOMI.NO discharge or conjunctival injection    ENMT:No sinus tenderness.No thrush.No pharyngeal exudate or erythema.Fair dental hygiene    Neck:Supple,No LN,no JVD      Respiratory:Good air entry bilaterally,CTA    Cardiovascular:S1 S2 wnl, No murmurs,rub or gallops    Gastrointestinal:Soft BS(+) no tenderness no masses ,No rebound or guarding    Genitourinary:No CVA tendereness     Rectal:    Extremities:No cyanosis,clubbing or edema.    Vascular:peripheral pulses felt    Neurological:AAO X 3,No grossly focal deficits    Skin:No rash     Lymph Nodes:No palpable LNs    Musculoskeletal:No joint swelling or LOM    Psychiatric:Affect normal.                                13.6   13.6  )-----------( 279      ( 28 Sep 2017 01:48 )             40.8         09-28    137  |  98  |  12  ----------------------------<  242<H>  3.9   |  24  |  0.68    Ca    8.7      28 Sep 2017 01:48    TPro  7.5  /  Alb  4.0  /  TBili  0.3  /  DBili  x   /  AST  15  /  ALT  18  /  AlkPhos  79  09-27      RECENT CULTURES:      MICROBIOLOGY:          Radiology:      Assessment:        Recommendations and Plan:    Pager [phone]  After 5 pm/weekends or if no response :[phone]

## 2017-09-28 NOTE — CONSULT NOTE ADULT - PROBLEM SELECTOR RECOMMENDATION 9
Will increase Lantus to 15 units at bed time.  Patient counseled for compliance with consistent low carb diet.

## 2017-09-29 ENCOUNTER — APPOINTMENT (OUTPATIENT)
Dept: CARDIOTHORACIC SURGERY | Facility: HOSPITAL | Age: 52
End: 2017-09-29
Payer: MEDICAID

## 2017-09-29 ENCOUNTER — RESULT REVIEW (OUTPATIENT)
Age: 52
End: 2017-09-29

## 2017-09-29 LAB
ANION GAP SERPL CALC-SCNC: 14 MMOL/L — SIGNIFICANT CHANGE UP (ref 5–17)
ANION GAP SERPL CALC-SCNC: 15 MMOL/L — SIGNIFICANT CHANGE UP (ref 5–17)
APTT BLD: 30.7 SEC — SIGNIFICANT CHANGE UP (ref 27.5–37.4)
APTT BLD: 49.7 SEC — HIGH (ref 27.5–37.4)
APTT BLD: 63.4 SEC — HIGH (ref 27.5–37.4)
BUN SERPL-MCNC: 12 MG/DL — SIGNIFICANT CHANGE UP (ref 7–23)
BUN SERPL-MCNC: 9 MG/DL — SIGNIFICANT CHANGE UP (ref 7–23)
CALCIUM SERPL-MCNC: 7.2 MG/DL — LOW (ref 8.4–10.5)
CALCIUM SERPL-MCNC: 8.8 MG/DL — SIGNIFICANT CHANGE UP (ref 8.4–10.5)
CHLORIDE SERPL-SCNC: 100 MMOL/L — SIGNIFICANT CHANGE UP (ref 96–108)
CHLORIDE SERPL-SCNC: 105 MMOL/L — SIGNIFICANT CHANGE UP (ref 96–108)
CK MB BLD-MCNC: 1.9 % — SIGNIFICANT CHANGE UP (ref 0–3.5)
CK MB BLD-MCNC: 12 % — HIGH (ref 0–3.5)
CK MB CFR SERPL CALC: 22.2 NG/ML — HIGH (ref 0–6.7)
CK MB CFR SERPL CALC: 34 NG/ML — HIGH (ref 0–6.7)
CK SERPL-CCNC: 1187 U/L — HIGH (ref 30–200)
CK SERPL-CCNC: 284 U/L — HIGH (ref 30–200)
CO2 SERPL-SCNC: 21 MMOL/L — LOW (ref 22–31)
CO2 SERPL-SCNC: 21 MMOL/L — LOW (ref 22–31)
CREAT SERPL-MCNC: 0.54 MG/DL — SIGNIFICANT CHANGE UP (ref 0.5–1.3)
CREAT SERPL-MCNC: 0.68 MG/DL — SIGNIFICANT CHANGE UP (ref 0.5–1.3)
FIBRINOGEN PPP-MCNC: 289 MG/DL — LOW (ref 310–510)
GAS PNL BLDA: SIGNIFICANT CHANGE UP
GLUCOSE SERPL-MCNC: 138 MG/DL — HIGH (ref 70–99)
GLUCOSE SERPL-MCNC: 202 MG/DL — HIGH (ref 70–99)
HCT VFR BLD CALC: 20.5 % — CRITICAL LOW (ref 39–50)
HCT VFR BLD CALC: 31.2 % — LOW (ref 39–50)
HCT VFR BLD CALC: 41.9 % — SIGNIFICANT CHANGE UP (ref 39–50)
HGB BLD-MCNC: 10.2 G/DL — LOW (ref 13–17)
HGB BLD-MCNC: 14.3 G/DL — SIGNIFICANT CHANGE UP (ref 13–17)
HGB BLD-MCNC: 7.1 G/DL — LOW (ref 13–17)
INR BLD: 1.47 RATIO — HIGH (ref 0.88–1.16)
INR BLD: 1.51 RATIO — HIGH (ref 0.88–1.16)
LYMPHOCYTES # BLD AUTO: 3 K/UL — SIGNIFICANT CHANGE UP (ref 1–3.3)
LYMPHOCYTES # BLD AUTO: 7 % — LOW (ref 13–44)
MCHC RBC-ENTMCNC: 29.7 PG — SIGNIFICANT CHANGE UP (ref 27–34)
MCHC RBC-ENTMCNC: 30.8 PG — SIGNIFICANT CHANGE UP (ref 27–34)
MCHC RBC-ENTMCNC: 31.4 PG — SIGNIFICANT CHANGE UP (ref 27–34)
MCHC RBC-ENTMCNC: 32.8 GM/DL — SIGNIFICANT CHANGE UP (ref 32–36)
MCHC RBC-ENTMCNC: 34.1 GM/DL — SIGNIFICANT CHANGE UP (ref 32–36)
MCHC RBC-ENTMCNC: 34.7 GM/DL — SIGNIFICANT CHANGE UP (ref 32–36)
MCV RBC AUTO: 90.4 FL — SIGNIFICANT CHANGE UP (ref 80–100)
MCV RBC AUTO: 90.4 FL — SIGNIFICANT CHANGE UP (ref 80–100)
MCV RBC AUTO: 90.7 FL — SIGNIFICANT CHANGE UP (ref 80–100)
MONOCYTES # BLD AUTO: 1.4 K/UL — HIGH (ref 0–0.9)
MONOCYTES NFR BLD AUTO: 6 % — SIGNIFICANT CHANGE UP (ref 2–14)
NEUTROPHILS # BLD AUTO: 25.5 K/UL — HIGH (ref 1.8–7.4)
NEUTROPHILS NFR BLD AUTO: 76 % — SIGNIFICANT CHANGE UP (ref 43–77)
PLATELET # BLD AUTO: 101 K/UL — LOW (ref 150–400)
PLATELET # BLD AUTO: 176 K/UL — SIGNIFICANT CHANGE UP (ref 150–400)
PLATELET # BLD AUTO: 272 K/UL — SIGNIFICANT CHANGE UP (ref 150–400)
POTASSIUM SERPL-MCNC: 3.8 MMOL/L — SIGNIFICANT CHANGE UP (ref 3.5–5.3)
POTASSIUM SERPL-MCNC: 4.8 MMOL/L — SIGNIFICANT CHANGE UP (ref 3.5–5.3)
POTASSIUM SERPL-SCNC: 3.8 MMOL/L — SIGNIFICANT CHANGE UP (ref 3.5–5.3)
POTASSIUM SERPL-SCNC: 4.8 MMOL/L — SIGNIFICANT CHANGE UP (ref 3.5–5.3)
PROTHROM AB SERPL-ACNC: 16 SEC — HIGH (ref 9.8–12.7)
PROTHROM AB SERPL-ACNC: 16.5 SEC — HIGH (ref 9.8–12.7)
RBC # BLD: 2.26 M/UL — LOW (ref 4.2–5.8)
RBC # BLD: 3.44 M/UL — LOW (ref 4.2–5.8)
RBC # BLD: 4.64 M/UL — SIGNIFICANT CHANGE UP (ref 4.2–5.8)
RBC # FLD: 11.6 % — SIGNIFICANT CHANGE UP (ref 10.3–14.5)
RBC # FLD: 11.8 % — SIGNIFICANT CHANGE UP (ref 10.3–14.5)
RBC # FLD: 11.9 % — SIGNIFICANT CHANGE UP (ref 10.3–14.5)
SODIUM SERPL-SCNC: 136 MMOL/L — SIGNIFICANT CHANGE UP (ref 135–145)
SODIUM SERPL-SCNC: 140 MMOL/L — SIGNIFICANT CHANGE UP (ref 135–145)
TROPONIN T SERPL-MCNC: 0.52 NG/ML — HIGH (ref 0–0.06)
TROPONIN T SERPL-MCNC: 0.8 NG/ML — HIGH (ref 0–0.06)
WBC # BLD: 14.1 K/UL — HIGH (ref 3.8–10.5)
WBC # BLD: 14.5 K/UL — HIGH (ref 3.8–10.5)
WBC # BLD: 30.2 K/UL — HIGH (ref 3.8–10.5)
WBC # FLD AUTO: 14.1 K/UL — HIGH (ref 3.8–10.5)
WBC # FLD AUTO: 14.5 K/UL — HIGH (ref 3.8–10.5)
WBC # FLD AUTO: 30.2 K/UL — HIGH (ref 3.8–10.5)

## 2017-09-29 PROCEDURE — 33519 CABG ARTERY-VEIN THREE: CPT

## 2017-09-29 PROCEDURE — 71010: CPT | Mod: 26

## 2017-09-29 PROCEDURE — 33519 CABG ARTERY-VEIN THREE: CPT | Mod: AS

## 2017-09-29 PROCEDURE — 33533 CABG ARTERIAL SINGLE: CPT

## 2017-09-29 PROCEDURE — 33533 CABG ARTERIAL SINGLE: CPT | Mod: AS

## 2017-09-29 PROCEDURE — 88305 TISSUE EXAM BY PATHOLOGIST: CPT | Mod: 26

## 2017-09-29 PROCEDURE — 93010 ELECTROCARDIOGRAM REPORT: CPT

## 2017-09-29 RX ORDER — AMINOCAPROIC ACID 500 MG/1
5 TABLET ORAL
Qty: 5 | Refills: 0 | Status: DISCONTINUED | OUTPATIENT
Start: 2017-09-29 | End: 2017-09-30

## 2017-09-29 RX ORDER — DOBUTAMINE HCL 250MG/20ML
1 VIAL (ML) INTRAVENOUS
Qty: 500 | Refills: 0 | Status: DISCONTINUED | OUTPATIENT
Start: 2017-09-29 | End: 2017-09-30

## 2017-09-29 RX ORDER — MEPERIDINE HYDROCHLORIDE 50 MG/ML
25 INJECTION INTRAMUSCULAR; INTRAVENOUS; SUBCUTANEOUS ONCE
Qty: 0 | Refills: 0 | Status: DISCONTINUED | OUTPATIENT
Start: 2017-09-29 | End: 2017-09-30

## 2017-09-29 RX ORDER — POTASSIUM CHLORIDE 20 MEQ
10 PACKET (EA) ORAL ONCE
Qty: 0 | Refills: 0 | Status: COMPLETED | OUTPATIENT
Start: 2017-09-29 | End: 2017-09-29

## 2017-09-29 RX ORDER — INSULIN GLARGINE 100 [IU]/ML
20 INJECTION, SOLUTION SUBCUTANEOUS AT BEDTIME
Qty: 0 | Refills: 0 | Status: DISCONTINUED | OUTPATIENT
Start: 2017-09-29 | End: 2017-09-29

## 2017-09-29 RX ORDER — NOREPINEPHRINE BITARTRATE/D5W 8 MG/250ML
0.08 PLASTIC BAG, INJECTION (ML) INTRAVENOUS
Qty: 8 | Refills: 0 | Status: DISCONTINUED | OUTPATIENT
Start: 2017-09-29 | End: 2017-09-30

## 2017-09-29 RX ORDER — PANTOPRAZOLE SODIUM 20 MG/1
40 TABLET, DELAYED RELEASE ORAL DAILY
Qty: 0 | Refills: 0 | Status: DISCONTINUED | OUTPATIENT
Start: 2017-09-29 | End: 2017-09-30

## 2017-09-29 RX ORDER — SODIUM CHLORIDE 9 MG/ML
1000 INJECTION INTRAMUSCULAR; INTRAVENOUS; SUBCUTANEOUS
Qty: 0 | Refills: 0 | Status: DISCONTINUED | OUTPATIENT
Start: 2017-09-29 | End: 2017-10-01

## 2017-09-29 RX ORDER — DEXTROSE 50 % IN WATER 50 %
1 SYRINGE (ML) INTRAVENOUS ONCE
Qty: 0 | Refills: 0 | Status: DISCONTINUED | OUTPATIENT
Start: 2017-09-29 | End: 2017-09-30

## 2017-09-29 RX ORDER — CEFUROXIME AXETIL 250 MG
1500 TABLET ORAL EVERY 8 HOURS
Qty: 0 | Refills: 0 | Status: COMPLETED | OUTPATIENT
Start: 2017-09-29 | End: 2017-10-01

## 2017-09-29 RX ORDER — LIDOCAINE HCL 20 MG/ML
0.2 VIAL (ML) INJECTION ONCE
Qty: 0 | Refills: 0 | Status: DISCONTINUED | OUTPATIENT
Start: 2017-09-29 | End: 2017-09-30

## 2017-09-29 RX ORDER — CALCIUM GLUCONATE 100 MG/ML
1 VIAL (ML) INTRAVENOUS ONCE
Qty: 0 | Refills: 0 | Status: COMPLETED | OUTPATIENT
Start: 2017-09-29 | End: 2017-09-29

## 2017-09-29 RX ORDER — SODIUM CHLORIDE 9 MG/ML
1000 INJECTION, SOLUTION INTRAVENOUS
Qty: 0 | Refills: 0 | Status: DISCONTINUED | OUTPATIENT
Start: 2017-09-29 | End: 2017-10-04

## 2017-09-29 RX ORDER — SODIUM CHLORIDE 9 MG/ML
1000 INJECTION, SOLUTION INTRAVENOUS
Qty: 0 | Refills: 0 | Status: DISCONTINUED | OUTPATIENT
Start: 2017-09-29 | End: 2017-09-29

## 2017-09-29 RX ORDER — DEXTROSE 50 % IN WATER 50 %
25 SYRINGE (ML) INTRAVENOUS ONCE
Qty: 0 | Refills: 0 | Status: DISCONTINUED | OUTPATIENT
Start: 2017-09-29 | End: 2017-09-30

## 2017-09-29 RX ORDER — ALBUMIN HUMAN 25 %
250 VIAL (ML) INTRAVENOUS
Qty: 0 | Refills: 0 | Status: COMPLETED | OUTPATIENT
Start: 2017-09-29 | End: 2017-09-29

## 2017-09-29 RX ORDER — CEFUROXIME AXETIL 250 MG
1500 TABLET ORAL ONCE
Qty: 0 | Refills: 0 | Status: COMPLETED | OUTPATIENT
Start: 2017-09-29 | End: 2017-09-29

## 2017-09-29 RX ORDER — ASPIRIN/CALCIUM CARB/MAGNESIUM 324 MG
81 TABLET ORAL DAILY
Qty: 0 | Refills: 0 | Status: DISCONTINUED | OUTPATIENT
Start: 2017-09-29 | End: 2017-10-04

## 2017-09-29 RX ORDER — POTASSIUM CHLORIDE 20 MEQ
10 PACKET (EA) ORAL
Qty: 0 | Refills: 0 | Status: DISCONTINUED | OUTPATIENT
Start: 2017-09-29 | End: 2017-09-30

## 2017-09-29 RX ORDER — FENTANYL CITRATE 50 UG/ML
50 INJECTION INTRAVENOUS ONCE
Qty: 0 | Refills: 0 | Status: DISCONTINUED | OUTPATIENT
Start: 2017-09-29 | End: 2017-09-29

## 2017-09-29 RX ORDER — POTASSIUM CHLORIDE 20 MEQ
10 PACKET (EA) ORAL ONCE
Qty: 0 | Refills: 0 | Status: DISCONTINUED | OUTPATIENT
Start: 2017-09-29 | End: 2017-09-29

## 2017-09-29 RX ORDER — VASOPRESSIN 20 [USP'U]/ML
0.1 INJECTION INTRAVENOUS
Qty: 100 | Refills: 0 | Status: DISCONTINUED | OUTPATIENT
Start: 2017-09-29 | End: 2017-09-30

## 2017-09-29 RX ORDER — INSULIN LISPRO 100/ML
9 VIAL (ML) SUBCUTANEOUS
Qty: 0 | Refills: 0 | Status: DISCONTINUED | OUTPATIENT
Start: 2017-09-29 | End: 2017-09-29

## 2017-09-29 RX ORDER — DEXMEDETOMIDINE HYDROCHLORIDE IN 0.9% SODIUM CHLORIDE 4 UG/ML
0.2 INJECTION INTRAVENOUS
Qty: 200 | Refills: 0 | Status: DISCONTINUED | OUTPATIENT
Start: 2017-09-29 | End: 2017-09-30

## 2017-09-29 RX ORDER — DEXTROSE 50 % IN WATER 50 %
12.5 SYRINGE (ML) INTRAVENOUS ONCE
Qty: 0 | Refills: 0 | Status: DISCONTINUED | OUTPATIENT
Start: 2017-09-29 | End: 2017-09-30

## 2017-09-29 RX ORDER — ASPIRIN/CALCIUM CARB/MAGNESIUM 324 MG
300 TABLET ORAL ONCE
Qty: 0 | Refills: 0 | Status: COMPLETED | OUTPATIENT
Start: 2017-09-29 | End: 2017-09-29

## 2017-09-29 RX ORDER — POTASSIUM CHLORIDE 20 MEQ
10 PACKET (EA) ORAL ONCE
Qty: 0 | Refills: 0 | Status: DISCONTINUED | OUTPATIENT
Start: 2017-09-29 | End: 2017-09-30

## 2017-09-29 RX ORDER — MILRINONE LACTATE 1 MG/ML
0.25 INJECTION, SOLUTION INTRAVENOUS
Qty: 20 | Refills: 0 | Status: DISCONTINUED | OUTPATIENT
Start: 2017-09-29 | End: 2017-09-30

## 2017-09-29 RX ORDER — GLUCAGON INJECTION, SOLUTION 0.5 MG/.1ML
1 INJECTION, SOLUTION SUBCUTANEOUS ONCE
Qty: 0 | Refills: 0 | Status: DISCONTINUED | OUTPATIENT
Start: 2017-09-29 | End: 2017-09-30

## 2017-09-29 RX ORDER — CLOPIDOGREL BISULFATE 75 MG/1
75 TABLET, FILM COATED ORAL DAILY
Qty: 0 | Refills: 0 | Status: DISCONTINUED | OUTPATIENT
Start: 2017-09-30 | End: 2017-10-04

## 2017-09-29 RX ORDER — DOCUSATE SODIUM 100 MG
100 CAPSULE ORAL THREE TIMES A DAY
Qty: 0 | Refills: 0 | Status: DISCONTINUED | OUTPATIENT
Start: 2017-09-29 | End: 2017-10-04

## 2017-09-29 RX ORDER — SODIUM CHLORIDE 9 MG/ML
3 INJECTION INTRAMUSCULAR; INTRAVENOUS; SUBCUTANEOUS EVERY 8 HOURS
Qty: 0 | Refills: 0 | Status: DISCONTINUED | OUTPATIENT
Start: 2017-09-29 | End: 2017-09-30

## 2017-09-29 RX ORDER — PROTAMINE SULFATE 10 MG/ML
25 AMPUL (ML) INTRAVENOUS ONCE
Qty: 0 | Refills: 0 | Status: COMPLETED | OUTPATIENT
Start: 2017-09-29 | End: 2017-09-29

## 2017-09-29 RX ORDER — METOCLOPRAMIDE HCL 10 MG
10 TABLET ORAL EVERY 8 HOURS
Qty: 0 | Refills: 0 | Status: DISCONTINUED | OUTPATIENT
Start: 2017-09-29 | End: 2017-10-01

## 2017-09-29 RX ORDER — ATORVASTATIN CALCIUM 80 MG/1
80 TABLET, FILM COATED ORAL AT BEDTIME
Qty: 0 | Refills: 0 | Status: DISCONTINUED | OUTPATIENT
Start: 2017-09-29 | End: 2017-10-04

## 2017-09-29 RX ORDER — INSULIN HUMAN 100 [IU]/ML
3 INJECTION, SOLUTION SUBCUTANEOUS
Qty: 100 | Refills: 0 | Status: DISCONTINUED | OUTPATIENT
Start: 2017-09-29 | End: 2017-09-30

## 2017-09-29 RX ADMIN — SODIUM CHLORIDE 10 MILLILITER(S): 9 INJECTION INTRAMUSCULAR; INTRAVENOUS; SUBCUTANEOUS at 16:48

## 2017-09-29 RX ADMIN — VASOPRESSIN 6 UNIT(S)/MIN: 20 INJECTION INTRAVENOUS at 16:47

## 2017-09-29 RX ADMIN — Medication 125 MILLILITER(S): at 17:16

## 2017-09-29 RX ADMIN — Medication 10 MILLIGRAM(S): at 23:12

## 2017-09-29 RX ADMIN — FENTANYL CITRATE 50 MICROGRAM(S): 50 INJECTION INTRAVENOUS at 18:15

## 2017-09-29 RX ADMIN — PANTOPRAZOLE SODIUM 40 MILLIGRAM(S): 20 TABLET, DELAYED RELEASE ORAL at 16:49

## 2017-09-29 RX ADMIN — Medication 100 MILLIEQUIVALENT(S): at 03:58

## 2017-09-29 RX ADMIN — Medication 50 MILLIGRAM(S): at 05:46

## 2017-09-29 RX ADMIN — Medication 100 MILLIGRAM(S): at 16:46

## 2017-09-29 RX ADMIN — MILRINONE LACTATE 8.71 MICROGRAM(S)/KG/MIN: 1 INJECTION, SOLUTION INTRAVENOUS at 16:47

## 2017-09-29 RX ADMIN — MUPIROCIN 1 APPLICATION(S): 20 OINTMENT TOPICAL at 05:46

## 2017-09-29 RX ADMIN — SODIUM CHLORIDE 3 MILLILITER(S): 9 INJECTION INTRAMUSCULAR; INTRAVENOUS; SUBCUTANEOUS at 21:41

## 2017-09-29 RX ADMIN — Medication 210 MILLIGRAM(S): at 23:12

## 2017-09-29 RX ADMIN — FENTANYL CITRATE 50 MICROGRAM(S): 50 INJECTION INTRAVENOUS at 18:04

## 2017-09-29 RX ADMIN — Medication 125 MILLILITER(S): at 18:06

## 2017-09-29 RX ADMIN — Medication 17.4 MICROGRAM(S)/KG/MIN: at 16:47

## 2017-09-29 RX ADMIN — Medication 6.97 MICROGRAM(S)/KG/MIN: at 16:46

## 2017-09-29 RX ADMIN — Medication 100 MILLIEQUIVALENT(S): at 21:38

## 2017-09-29 RX ADMIN — Medication 200 GRAM(S): at 22:28

## 2017-09-29 RX ADMIN — Medication 125 MILLILITER(S): at 15:58

## 2017-09-29 RX ADMIN — Medication 125 MILLILITER(S): at 16:25

## 2017-09-29 RX ADMIN — AMINOCAPROIC ACID 250 GM/HR: 500 TABLET ORAL at 23:12

## 2017-09-29 RX ADMIN — DEXMEDETOMIDINE HYDROCHLORIDE IN 0.9% SODIUM CHLORIDE 5.81 MICROGRAM(S)/KG/HR: 4 INJECTION INTRAVENOUS at 16:47

## 2017-09-29 NOTE — PROGRESS NOTE ADULT - SUBJECTIVE AND OBJECTIVE BOX
Chief complaint  Patient is a 52y old  Male who presents with a chief complaint of Chest pain (27 Sep 2017 17:58)   Review of systems  Patient in bed, comfortable, no fever, no hypoglycemia.    Labs and Fingersticks    CAPILLARY BLOOD GLUCOSE  172 (29 Sep 2017 18:00)  202 (29 Sep 2017 17:00)  194 (29 Sep 2017 16:00)  116 (29 Sep 2017 06:50)  116 (29 Sep 2017 06:00)  119 (29 Sep 2017 05:00)  118 (29 Sep 2017 04:00)  122 (29 Sep 2017 03:00)  126 (29 Sep 2017 02:00)  173 (29 Sep 2017 01:02)  247 (29 Sep 2017 00:04)  248 (28 Sep 2017 23:00)  254 (28 Sep 2017 22:04)  207 (28 Sep 2017 16:20)  225 (28 Sep 2017 11:08)  176 (28 Sep 2017 07:19)  210 (27 Sep 2017 21:40)  186 (27 Sep 2017 18:07)    Anion Gap, Serum: 14 (09-29 @ 15:58)  Anion Gap, Serum: 15 (09-29 @ 02:33)  Anion Gap, Serum: 15 (09-28 @ 01:48)    Hemoglobin A1C, Whole Blood: 10.6 <H> (09-27 @ 21:43)    Calcium, Total Serum: 7.2 <L> (09-29 @ 15:58)  Calcium, Total Serum: 8.8 (09-29 @ 02:33)  Calcium, Total Serum: 8.7 (09-28 @ 01:48)          09-29    140  |  105  |  9   ----------------------------<  202<H>  4.8   |  21<L>  |  0.54    Ca    7.2<L>      29 Sep 2017 15:58                          10.2   30.2  )-----------( 176      ( 29 Sep 2017 15:58 )             31.2     Medications  MEDICATIONS  (STANDING):  sodium chloride 0.9% lock flush 3 milliLiter(s) IV Push every 8 hours  influenza   Vaccine 0.5 milliLiter(s) IntraMuscular once  pantoprazole  Injectable 40 milliGRAM(s) IV Push daily  docusate sodium 100 milliGRAM(s) Oral three times a day  potassium chloride  10 mEq/50 mL IVPB 10 milliEquivalent(s) IV Intermittent every 1 hour  potassium chloride  10 mEq/50 mL IVPB 10 milliEquivalent(s) IV Intermittent every 1 hour  potassium chloride  10 mEq/50 mL IVPB 10 milliEquivalent(s) IV Intermittent once  insulin Infusion 3 Unit(s)/Hr (3 mL/Hr) IV Continuous <Continuous>  metoclopramide Injectable 10 milliGRAM(s) IV Push every 8 hours  sodium chloride 0.9%. 1000 milliLiter(s) (10 mL/Hr) IV Continuous <Continuous>  aspirin enteric coated 81 milliGRAM(s) Oral daily  aspirin Suppository 300 milliGRAM(s) Rectal once  atorvastatin 80 milliGRAM(s) Oral at bedtime  norepinephrine Infusion 0.08 MICROgram(s)/kG/Min (17.4 mL/Hr) IV Continuous <Continuous>  vasopressin Infusion 0.1 Unit(s)/Min (6 mL/Hr) IV Continuous <Continuous>  milrinone Infusion 0.25 MICROgram(s)/kG/Min (8.709 mL/Hr) IV Continuous <Continuous>  cefuroxime  IVPB 1500 milliGRAM(s) IV Intermittent every 8 hours  DOBUTamine Infusion 2 MICROgram(s)/kG/Min (6.967 mL/Hr) IV Continuous <Continuous>  dexmedetomidine Infusion 0.2 MICROgram(s)/kG/Hr (5.806 mL/Hr) IV Continuous <Continuous>      Physical Exam  General: Patient comfortable in bed  Vital Signs Last 12 Hrs  T(F): 96.8 (09-29-17 @ 15:40), Max: 96.8 (09-29-17 @ 15:38)  HR: 95 (09-29-17 @ 18:45) (78 - 108)  BP: --  BP(mean): --  RR: 10 (09-29-17 @ 18:45) (2 - 18)  SpO2: 100% (09-29-17 @ 18:45) (97% - 100%)  Neck: No palpable thyroid nodules.  CVS: S1S2, No murmurs  Respiratory: No wheezing, no crepitations  GI: Abdomen soft, bowel sounds positive  Musculoskeletal: Positive edema lower extremities bilaterally  Skin: No skin rashes, no ecchymosis    Diagnostics Chief complaint  Patient is a 52y old  Male who presents with a chief complaint of Chest pain (27 Sep 2017 17:58)   Review of systems  Patient in bed, comfortable, s/p Sx,  no fever, no hypoglycemia.    Labs and Fingersticks    CAPILLARY BLOOD GLUCOSE  172 (29 Sep 2017 18:00)  202 (29 Sep 2017 17:00)  194 (29 Sep 2017 16:00)  116 (29 Sep 2017 06:50)  116 (29 Sep 2017 06:00)  119 (29 Sep 2017 05:00)  118 (29 Sep 2017 04:00)  122 (29 Sep 2017 03:00)  126 (29 Sep 2017 02:00)  173 (29 Sep 2017 01:02)  247 (29 Sep 2017 00:04)  248 (28 Sep 2017 23:00)  254 (28 Sep 2017 22:04)  207 (28 Sep 2017 16:20)  225 (28 Sep 2017 11:08)  176 (28 Sep 2017 07:19)  210 (27 Sep 2017 21:40)  186 (27 Sep 2017 18:07)    Anion Gap, Serum: 14 (09-29 @ 15:58)  Anion Gap, Serum: 15 (09-29 @ 02:33)  Anion Gap, Serum: 15 (09-28 @ 01:48)    Hemoglobin A1C, Whole Blood: 10.6 <H> (09-27 @ 21:43)    Calcium, Total Serum: 7.2 <L> (09-29 @ 15:58)  Calcium, Total Serum: 8.8 (09-29 @ 02:33)  Calcium, Total Serum: 8.7 (09-28 @ 01:48)          09-29    140  |  105  |  9   ----------------------------<  202<H>  4.8   |  21<L>  |  0.54    Ca    7.2<L>      29 Sep 2017 15:58                          10.2   30.2  )-----------( 176      ( 29 Sep 2017 15:58 )             31.2     Medications  MEDICATIONS  (STANDING):  sodium chloride 0.9% lock flush 3 milliLiter(s) IV Push every 8 hours  influenza   Vaccine 0.5 milliLiter(s) IntraMuscular once  pantoprazole  Injectable 40 milliGRAM(s) IV Push daily  docusate sodium 100 milliGRAM(s) Oral three times a day  potassium chloride  10 mEq/50 mL IVPB 10 milliEquivalent(s) IV Intermittent every 1 hour  potassium chloride  10 mEq/50 mL IVPB 10 milliEquivalent(s) IV Intermittent every 1 hour  potassium chloride  10 mEq/50 mL IVPB 10 milliEquivalent(s) IV Intermittent once  insulin Infusion 3 Unit(s)/Hr (3 mL/Hr) IV Continuous <Continuous>  metoclopramide Injectable 10 milliGRAM(s) IV Push every 8 hours  sodium chloride 0.9%. 1000 milliLiter(s) (10 mL/Hr) IV Continuous <Continuous>  aspirin enteric coated 81 milliGRAM(s) Oral daily  aspirin Suppository 300 milliGRAM(s) Rectal once  atorvastatin 80 milliGRAM(s) Oral at bedtime  norepinephrine Infusion 0.08 MICROgram(s)/kG/Min (17.4 mL/Hr) IV Continuous <Continuous>  vasopressin Infusion 0.1 Unit(s)/Min (6 mL/Hr) IV Continuous <Continuous>  milrinone Infusion 0.25 MICROgram(s)/kG/Min (8.709 mL/Hr) IV Continuous <Continuous>  cefuroxime  IVPB 1500 milliGRAM(s) IV Intermittent every 8 hours  DOBUTamine Infusion 2 MICROgram(s)/kG/Min (6.967 mL/Hr) IV Continuous <Continuous>  dexmedetomidine Infusion 0.2 MICROgram(s)/kG/Hr (5.806 mL/Hr) IV Continuous <Continuous>      Physical Exam  General: Patient is intubated, comfortable in bed  Vital Signs Last 12 Hrs  T(F): 96.8 (09-29-17 @ 15:40), Max: 96.8 (09-29-17 @ 15:38)  HR: 95 (09-29-17 @ 18:45) (78 - 108)  BP: --  BP(mean): --  RR: 10 (09-29-17 @ 18:45) (2 - 18)  SpO2: 100% (09-29-17 @ 18:45) (97% - 100%)  Neck: No palpable thyroid nodules.  CVS: S1S2, No murmurs  Respiratory: No wheezing, no crepitations  GI: Abdomen soft, bowel sounds positive  Musculoskeletal: Positive edema lower extremities bilaterally  Skin: No skin rashes, no ecchymosis    Diagnostics

## 2017-09-29 NOTE — PROGRESS NOTE ADULT - ASSESSMENT
Assessment  DMT2: 52y Male with DM T2 with hyperglycemia on insulin, with neuropathy, with nephropathy, blood sugars running high,no hypoglycemia, NPO for sx in am,  non compliant with low carb diet.  HTN: Controlled, On med.  HLD:  On meds.  CAD:On meds, monitoered. Assessment  DMT2: 52y Male with DM T2 with hyperglycemia on insulin, s/p Sx,  blood sugars in acceptable range, no hypoglycemia, NPO for now, TN: Controlled, On med.  HLD:  On meds.  CAD: S/P Sx, Stable, on meds, monitored.

## 2017-09-29 NOTE — BRIEF OPERATIVE NOTE - PROCEDURE
<<-----Click on this checkbox to enter Procedure CABG, using internal thoracic artery and endoscopic vein procurement, adult  09/29/2017  CABG X 5 (LIMA to LAD, SVG to PDA, SVG to Ramus, SVG to OM, SVG to Diag)  Active  CHART1

## 2017-09-29 NOTE — PROGRESS NOTE ADULT - PROBLEM SELECTOR PLAN 1
Will increase Lantus to 20 units at bed time.  Will increase Humalog to 9 units before each meal in addition to Humalog correction scale coverage.  Patient counseled for compliance with consistent low carb diet.  Will request diabetic teaching since patient will be discharged home on insulin. Will continue current insulin regimen for now. Will continue monitoring FS, log, will Follow up.

## 2017-09-30 DIAGNOSIS — E11.9 TYPE 2 DIABETES MELLITUS WITHOUT COMPLICATIONS: ICD-10-CM

## 2017-09-30 DIAGNOSIS — E78.2 MIXED HYPERLIPIDEMIA: ICD-10-CM

## 2017-09-30 DIAGNOSIS — D50.0 IRON DEFICIENCY ANEMIA SECONDARY TO BLOOD LOSS (CHRONIC): ICD-10-CM

## 2017-09-30 DIAGNOSIS — Z29.9 ENCOUNTER FOR PROPHYLACTIC MEASURES, UNSPECIFIED: ICD-10-CM

## 2017-09-30 DIAGNOSIS — I25.118 ATHEROSCLEROTIC HEART DISEASE OF NATIVE CORONARY ARTERY WITH OTHER FORMS OF ANGINA PECTORIS: ICD-10-CM

## 2017-09-30 LAB
ALBUMIN SERPL ELPH-MCNC: 3.1 G/DL — LOW (ref 3.3–5)
ALP SERPL-CCNC: 23 U/L — LOW (ref 40–120)
ALT FLD-CCNC: 14 U/L RC — SIGNIFICANT CHANGE UP (ref 10–45)
ANION GAP SERPL CALC-SCNC: 9 MMOL/L — SIGNIFICANT CHANGE UP (ref 5–17)
APTT BLD: 30.5 SEC — SIGNIFICANT CHANGE UP (ref 27.5–37.4)
AST SERPL-CCNC: 40 U/L — SIGNIFICANT CHANGE UP (ref 10–40)
BILIRUB SERPL-MCNC: 1.2 MG/DL — SIGNIFICANT CHANGE UP (ref 0.2–1.2)
BUN SERPL-MCNC: 10 MG/DL — SIGNIFICANT CHANGE UP (ref 7–23)
CALCIUM SERPL-MCNC: 7.6 MG/DL — LOW (ref 8.4–10.5)
CHLORIDE SERPL-SCNC: 108 MMOL/L — SIGNIFICANT CHANGE UP (ref 96–108)
CK MB BLD-MCNC: 0.7 % — SIGNIFICANT CHANGE UP (ref 0–3.5)
CK MB BLD-MCNC: 1.4 % — SIGNIFICANT CHANGE UP (ref 0–3.5)
CK MB CFR SERPL CALC: 16 NG/ML — HIGH (ref 0–6.7)
CK MB CFR SERPL CALC: 19.6 NG/ML — HIGH (ref 0–6.7)
CK SERPL-CCNC: 1423 U/L — HIGH (ref 30–200)
CK SERPL-CCNC: 2206 U/L — HIGH (ref 30–200)
CO2 SERPL-SCNC: 24 MMOL/L — SIGNIFICANT CHANGE UP (ref 22–31)
CREAT SERPL-MCNC: 0.65 MG/DL — SIGNIFICANT CHANGE UP (ref 0.5–1.3)
GAS PNL BLDA: SIGNIFICANT CHANGE UP
GLUCOSE SERPL-MCNC: 122 MG/DL — HIGH (ref 70–99)
HCT VFR BLD CALC: 24.1 % — LOW (ref 39–50)
HGB BLD-MCNC: 8.6 G/DL — LOW (ref 13–17)
INR BLD: 1.46 RATIO — HIGH (ref 0.88–1.16)
MAGNESIUM SERPL-MCNC: 2 MG/DL — SIGNIFICANT CHANGE UP (ref 1.6–2.6)
MCHC RBC-ENTMCNC: 32.2 PG — SIGNIFICANT CHANGE UP (ref 27–34)
MCHC RBC-ENTMCNC: 35.8 GM/DL — SIGNIFICANT CHANGE UP (ref 32–36)
MCV RBC AUTO: 90 FL — SIGNIFICANT CHANGE UP (ref 80–100)
PHOSPHATE SERPL-MCNC: 2.7 MG/DL — SIGNIFICANT CHANGE UP (ref 2.5–4.5)
PLATELET # BLD AUTO: 126 K/UL — LOW (ref 150–400)
POTASSIUM SERPL-MCNC: 4.6 MMOL/L — SIGNIFICANT CHANGE UP (ref 3.5–5.3)
POTASSIUM SERPL-SCNC: 4.6 MMOL/L — SIGNIFICANT CHANGE UP (ref 3.5–5.3)
PROT SERPL-MCNC: 4.8 G/DL — LOW (ref 6–8.3)
PROTHROM AB SERPL-ACNC: 15.9 SEC — HIGH (ref 9.8–12.7)
RBC # BLD: 2.68 M/UL — LOW (ref 4.2–5.8)
RBC # FLD: 11.7 % — SIGNIFICANT CHANGE UP (ref 10.3–14.5)
SODIUM SERPL-SCNC: 141 MMOL/L — SIGNIFICANT CHANGE UP (ref 135–145)
TROPONIN T SERPL-MCNC: 0.52 NG/ML — HIGH (ref 0–0.06)
TROPONIN T SERPL-MCNC: 0.76 NG/ML — HIGH (ref 0–0.06)
WBC # BLD: 14.3 K/UL — HIGH (ref 3.8–10.5)
WBC # FLD AUTO: 14.3 K/UL — HIGH (ref 3.8–10.5)

## 2017-09-30 PROCEDURE — 93010 ELECTROCARDIOGRAM REPORT: CPT

## 2017-09-30 PROCEDURE — 71010: CPT | Mod: 26

## 2017-09-30 PROCEDURE — 99291 CRITICAL CARE FIRST HOUR: CPT

## 2017-09-30 RX ORDER — POTASSIUM CHLORIDE 20 MEQ
10 PACKET (EA) ORAL
Qty: 0 | Refills: 0 | Status: COMPLETED | OUTPATIENT
Start: 2017-09-30 | End: 2017-09-30

## 2017-09-30 RX ORDER — HYDROMORPHONE HYDROCHLORIDE 2 MG/ML
0.5 INJECTION INTRAMUSCULAR; INTRAVENOUS; SUBCUTANEOUS ONCE
Qty: 0 | Refills: 0 | Status: DISCONTINUED | OUTPATIENT
Start: 2017-09-30 | End: 2017-09-30

## 2017-09-30 RX ORDER — INSULIN LISPRO 100/ML
VIAL (ML) SUBCUTANEOUS AT BEDTIME
Qty: 0 | Refills: 0 | Status: DISCONTINUED | OUTPATIENT
Start: 2017-09-30 | End: 2017-10-02

## 2017-09-30 RX ORDER — DEXTROSE 50 % IN WATER 50 %
25 SYRINGE (ML) INTRAVENOUS ONCE
Qty: 0 | Refills: 0 | Status: DISCONTINUED | OUTPATIENT
Start: 2017-09-30 | End: 2017-10-04

## 2017-09-30 RX ORDER — DEXTROSE 50 % IN WATER 50 %
25 SYRINGE (ML) INTRAVENOUS ONCE
Qty: 0 | Refills: 0 | Status: DISCONTINUED | OUTPATIENT
Start: 2017-09-30 | End: 2017-09-30

## 2017-09-30 RX ORDER — INSULIN HUMAN 100 [IU]/ML
10 INJECTION, SOLUTION SUBCUTANEOUS
Qty: 0 | Refills: 0 | Status: DISCONTINUED | OUTPATIENT
Start: 2017-09-30 | End: 2017-10-01

## 2017-09-30 RX ORDER — INSULIN HUMAN 100 [IU]/ML
2 INJECTION, SOLUTION SUBCUTANEOUS
Qty: 100 | Refills: 0 | Status: DISCONTINUED | OUTPATIENT
Start: 2017-09-30 | End: 2017-10-01

## 2017-09-30 RX ORDER — DEXTROSE 50 % IN WATER 50 %
1 SYRINGE (ML) INTRAVENOUS ONCE
Qty: 0 | Refills: 0 | Status: DISCONTINUED | OUTPATIENT
Start: 2017-09-30 | End: 2017-10-04

## 2017-09-30 RX ORDER — GLUCAGON INJECTION, SOLUTION 0.5 MG/.1ML
1 INJECTION, SOLUTION SUBCUTANEOUS ONCE
Qty: 0 | Refills: 0 | Status: DISCONTINUED | OUTPATIENT
Start: 2017-09-30 | End: 2017-10-04

## 2017-09-30 RX ORDER — ENOXAPARIN SODIUM 100 MG/ML
30 INJECTION SUBCUTANEOUS EVERY 12 HOURS
Qty: 0 | Refills: 0 | Status: DISCONTINUED | OUTPATIENT
Start: 2017-09-30 | End: 2017-10-04

## 2017-09-30 RX ORDER — INSULIN LISPRO 100/ML
VIAL (ML) SUBCUTANEOUS
Qty: 0 | Refills: 0 | Status: DISCONTINUED | OUTPATIENT
Start: 2017-09-30 | End: 2017-10-02

## 2017-09-30 RX ORDER — DEXTROSE 50 % IN WATER 50 %
12.5 SYRINGE (ML) INTRAVENOUS ONCE
Qty: 0 | Refills: 0 | Status: DISCONTINUED | OUTPATIENT
Start: 2017-09-30 | End: 2017-10-04

## 2017-09-30 RX ORDER — PANTOPRAZOLE SODIUM 20 MG/1
40 TABLET, DELAYED RELEASE ORAL
Qty: 0 | Refills: 0 | Status: DISCONTINUED | OUTPATIENT
Start: 2017-09-30 | End: 2017-10-04

## 2017-09-30 RX ORDER — INSULIN GLARGINE 100 [IU]/ML
30 INJECTION, SOLUTION SUBCUTANEOUS AT BEDTIME
Qty: 0 | Refills: 0 | Status: DISCONTINUED | OUTPATIENT
Start: 2017-09-30 | End: 2017-10-02

## 2017-09-30 RX ADMIN — Medication 100 MILLIEQUIVALENT(S): at 00:15

## 2017-09-30 RX ADMIN — Medication 10 MILLIGRAM(S): at 22:18

## 2017-09-30 RX ADMIN — CLOPIDOGREL BISULFATE 75 MILLIGRAM(S): 75 TABLET, FILM COATED ORAL at 13:14

## 2017-09-30 RX ADMIN — Medication 10 MILLIGRAM(S): at 13:17

## 2017-09-30 RX ADMIN — HYDROMORPHONE HYDROCHLORIDE 0.5 MILLIGRAM(S): 2 INJECTION INTRAMUSCULAR; INTRAVENOUS; SUBCUTANEOUS at 10:00

## 2017-09-30 RX ADMIN — PANTOPRAZOLE SODIUM 40 MILLIGRAM(S): 20 TABLET, DELAYED RELEASE ORAL at 13:14

## 2017-09-30 RX ADMIN — ATORVASTATIN CALCIUM 80 MILLIGRAM(S): 80 TABLET, FILM COATED ORAL at 22:11

## 2017-09-30 RX ADMIN — Medication 100 MILLIEQUIVALENT(S): at 09:30

## 2017-09-30 RX ADMIN — Medication 100 MILLIGRAM(S): at 00:32

## 2017-09-30 RX ADMIN — INSULIN GLARGINE 30 UNIT(S): 100 INJECTION, SOLUTION SUBCUTANEOUS at 22:11

## 2017-09-30 RX ADMIN — Medication 100 MILLIEQUIVALENT(S): at 09:00

## 2017-09-30 RX ADMIN — Medication 6.97 MICROGRAM(S)/KG/MIN: at 06:09

## 2017-09-30 RX ADMIN — Medication 100 MILLIGRAM(S): at 13:17

## 2017-09-30 RX ADMIN — ENOXAPARIN SODIUM 30 MILLIGRAM(S): 100 INJECTION SUBCUTANEOUS at 12:00

## 2017-09-30 RX ADMIN — HYDROMORPHONE HYDROCHLORIDE 0.5 MILLIGRAM(S): 2 INJECTION INTRAMUSCULAR; INTRAVENOUS; SUBCUTANEOUS at 10:22

## 2017-09-30 RX ADMIN — Medication 100 MILLIGRAM(S): at 08:04

## 2017-09-30 RX ADMIN — Medication 100 MILLIGRAM(S): at 16:59

## 2017-09-30 RX ADMIN — Medication 10 MILLIGRAM(S): at 06:08

## 2017-09-30 RX ADMIN — HYDROMORPHONE HYDROCHLORIDE 0.5 MILLIGRAM(S): 2 INJECTION INTRAMUSCULAR; INTRAVENOUS; SUBCUTANEOUS at 18:30

## 2017-09-30 RX ADMIN — Medication 100 MILLIEQUIVALENT(S): at 10:00

## 2017-09-30 RX ADMIN — HYDROMORPHONE HYDROCHLORIDE 0.5 MILLIGRAM(S): 2 INJECTION INTRAMUSCULAR; INTRAVENOUS; SUBCUTANEOUS at 18:50

## 2017-09-30 RX ADMIN — Medication 100 MILLIGRAM(S): at 22:11

## 2017-09-30 RX ADMIN — INSULIN HUMAN 3 UNIT(S)/HR: 100 INJECTION, SOLUTION SUBCUTANEOUS at 06:09

## 2017-09-30 RX ADMIN — Medication 100 MILLIEQUIVALENT(S): at 00:41

## 2017-09-30 RX ADMIN — Medication 100 MILLIEQUIVALENT(S): at 01:05

## 2017-09-30 RX ADMIN — Medication 81 MILLIGRAM(S): at 13:14

## 2017-09-30 NOTE — PHYSICAL THERAPY INITIAL EVALUATION ADULT - ACTIVE RANGE OF MOTION EXAMINATION, REHAB EVAL
bilateral upper extremity Active ROM was WFL (within functional limits)/bilateral  lower extremity Active ROM was WFL (within functional limits)/sternal precautions maintained

## 2017-09-30 NOTE — PHYSICAL THERAPY INITIAL EVALUATION ADULT - PERTINENT HX OF CURRENT PROBLEM, REHAB EVAL
Pt is 52M admitted 9/27/17 PMH CAD,HTN,DM2 HLD, multi vessel disease s/p cardiac cath (8/18), preop lab workup revealed leukocytosis- UA negative blood cx drawn & Urine culture sent. Pt directly admitted to [address] [place] [address] placed on heparin gtt. Scheduled for CABG  9/29.

## 2017-09-30 NOTE — PROGRESS NOTE ADULT - ASSESSMENT
52M PMH HTN, HLD, DM2, CAD s/p PCI 2012, carotid stenosis, JASON, Bell's palsy, h/o DVT now POD 1 s/p C5L. Received 2U PRBC, Amicar 5g IV x 1 2/2 postop blood loss. Remains on dobutamine for inotropic support. Remains on insulin gtt for hyperglycemia. 52M PMH HTN, HLD, DM2, CAD s/p PCI 2012, carotid stenosis, JASON, Bell's palsy, h/o DVT now POD 1 s/p C5L. Received 3U PRBC, Amicar 5g IV x 1 2/2 postop blood loss. Remains on dobutamine for inotropic support. Remains on insulin gtt for hyperglycemia.

## 2017-09-30 NOTE — PROGRESS NOTE ADULT - SUBJECTIVE AND OBJECTIVE BOX
Operation C5L  POD 1  Narrative Patient intubated. Denies pain upon inquiring.    Vital Signs Last 24 Hrs  T(C): 38 (30 Sep 2017 00:00), Max: 38 (30 Sep 2017 00:00)  T(F): 100.4 (30 Sep 2017 00:00), Max: 100.4 (30 Sep 2017 00:00)  HR: 98 (30 Sep 2017 00:30) (64 - 108)  BP: 143/85 (29 Sep 2017 03:00) (143/85 - 143/85)  RR: 10 (30 Sep 2017 00:00) (2 - 18)  SpO2: 100% (30 Sep 2017 00:30) (96% - 100%)  I&O's Detail    28 Sep 2017 07:01  -  29 Sep 2017 07:00  --------------------------------------------------------  IN:    heparin Infusion: 272 mL    heparin Infusion: 115.5 mL    insulin Infusion: 26 mL    Oral Fluid: 1090 mL  Total IN: 1503.5 mL    OUT:    Voided: 1600 mL  Total OUT: 1600 mL    Total NET: -96.5 mL    29 Sep 2017 07:01  -  30 Sep 2017 00:55  --------------------------------------------------------  IN:    Albumin 5%  - 250 mL: 1250 mL    aminocaproic acid Infusion: 250 mL    dexmedetomidine Infusion: 171.1 mL    DOBUTamine Infusion: 38.2 mL    DOBUTamine Infusion: 50.3 mL    insulin Infusion: 68 mL    IV PiggyBack: 250 mL    milrinone  Infusion: 17.4 mL    norepinephrine Infusion: 74.8 mL    Packed Red Blood Cells: 550 mL    sodium chloride 0.9%.: 100 mL    vasopressin Infusion: 34 mL  Total IN: 2853.8 mL    OUT:    Chest Tube: 370 mL    Chest Tube: 390 mL    Indwelling Catheter - Urethral: 950 mL  Total OUT: 1710 mL    Total NET: 1143.8 mL    MEDICATIONS  (STANDING):  sodium chloride 0.9% lock flush 3 milliLiter(s) IV Push every 8 hours  influenza   Vaccine 0.5 milliLiter(s) IntraMuscular once  pantoprazole  Injectable 40 milliGRAM(s) IV Push daily  docusate sodium 100 milliGRAM(s) Oral three times a day  potassium chloride  10 mEq/50 mL IVPB 10 milliEquivalent(s) IV Intermittent every 1 hour  potassium chloride  10 mEq/50 mL IVPB 10 milliEquivalent(s) IV Intermittent every 1 hour  potassium chloride  10 mEq/50 mL IVPB 10 milliEquivalent(s) IV Intermittent once  insulin Infusion 3 Unit(s)/Hr (3 mL/Hr) IV Continuous <Continuous>  metoclopramide Injectable 10 milliGRAM(s) IV Push every 8 hours  sodium chloride 0.9%. 1000 milliLiter(s) (10 mL/Hr) IV Continuous <Continuous>  aspirin enteric coated 81 milliGRAM(s) Oral daily  atorvastatin 80 milliGRAM(s) Oral at bedtime  norepinephrine Infusion 0.08 MICROgram(s)/kG/Min (17.4 mL/Hr) IV Continuous <Continuous>  vasopressin Infusion 0.1 Unit(s)/Min (6 mL/Hr) IV Continuous <Continuous>  milrinone Infusion 0.25 MICROgram(s)/kG/Min (8.709 mL/Hr) IV Continuous <Continuous>  cefuroxime  IVPB 1500 milliGRAM(s) IV Intermittent every 8 hours  DOBUTamine Infusion 3 MICROgram(s)/kG/Min (10.451 mL/Hr) IV Continuous <Continuous>  dexmedetomidine Infusion 0.2 MICROgram(s)/kG/Hr (5.806 mL/Hr) IV Continuous <Continuous>  clopidogrel Tablet 75 milliGRAM(s) Oral daily  dextrose 5%. 1000 milliLiter(s) (50 mL/Hr) IV Continuous <Continuous>  dextrose 50% Injectable 12.5 Gram(s) IV Push once  dextrose 50% Injectable 25 Gram(s) IV Push once  dextrose 50% Injectable 25 Gram(s) IV Push once  aminocaproic acid Infusion 5 Gm/Hr (250 mL/Hr) IV Continuous <Continuous>  potassium chloride  10 mEq/50 mL IVPB 10 milliEquivalent(s) IV Intermittent every 1 hour    MEDICATIONS  (PRN):  lidocaine 1% Injectable 0.2 milliLiter(s) Local Injection once PRN prior IV insertion  meperidine     Injectable 25 milliGRAM(s) IV Push once PRN For Shivering  dextrose Gel 1 Dose(s) Oral once PRN Blood Glucose LESS THAN 70 milliGRAM(s)/deciLiter  glucagon  Injectable 1 milliGRAM(s) IntraMuscular once PRN Glucose <70 milliGRAM(s)/deciLiter    PHYSICAL EXAM:  General: Intubated, in NAD.  Chest: sternal dressing C/D/I  Heart: S1, S2, RRR. SR 90s. VVI50.  Lungs: CTA B/L, without W/R/R  Abdomen: Soft, ND, NT, positive BS  Extremities' without edema B/L LE, positive DP pulses B/L.   Tubes/lines: RIJ intro, left radial juventino, L pleural chest tube, 2 mediastinal chest tubes, campos    Does the patient have a history of CHF: No  -If yes, systolic or diastolic:  -pre-operative EF: 67%    Extubation within 24 hours: Pending    Does the patient have a history of kidney disease: No  -what is patient's baseline Creatinine: 0.84    Beta Blockers contraindicated for the first 24 hours due to vasopressor/inotropic medication: Pending  -If on pressors, indication:    Did the patient receive transfusion of blood and/or products: Yes  -If yes, indication: ABL anemia    DVT PPX: Venodynes    Shena-operative antibiotics discontinued within 48 hours of CTU admission: Pending    Patient care discussed on morning interdisciplinary rounds with CTS team. Operation C5L  POD 1  Narrative Patient intubated. Denies pain upon inquiring.    Vital Signs Last 24 Hrs  T(C): 38 (30 Sep 2017 00:00), Max: 38 (30 Sep 2017 00:00)  T(F): 100.4 (30 Sep 2017 00:00), Max: 100.4 (30 Sep 2017 00:00)  HR: 98 (30 Sep 2017 00:30) (64 - 108)  BP: 143/85 (29 Sep 2017 03:00) (143/85 - 143/85)  RR: 10 (30 Sep 2017 00:00) (2 - 18)  SpO2: 100% (30 Sep 2017 00:30) (96% - 100%)  I&O's Detail    28 Sep 2017 07:01  -  29 Sep 2017 07:00  --------------------------------------------------------  IN:    heparin Infusion: 272 mL    heparin Infusion: 115.5 mL    insulin Infusion: 26 mL    Oral Fluid: 1090 mL  Total IN: 1503.5 mL    OUT:    Voided: 1600 mL  Total OUT: 1600 mL    Total NET: -96.5 mL    29 Sep 2017 07:01  -  30 Sep 2017 00:55  --------------------------------------------------------  IN:    Albumin 5%  - 250 mL: 1250 mL    aminocaproic acid Infusion: 250 mL    dexmedetomidine Infusion: 171.1 mL    DOBUTamine Infusion: 38.2 mL    DOBUTamine Infusion: 50.3 mL    insulin Infusion: 68 mL    IV PiggyBack: 250 mL    milrinone  Infusion: 17.4 mL    norepinephrine Infusion: 74.8 mL    Packed Red Blood Cells: 550 mL    sodium chloride 0.9%.: 100 mL    vasopressin Infusion: 34 mL  Total IN: 2853.8 mL    OUT:    Chest Tube: 370 mL    Chest Tube: 390 mL    Indwelling Catheter - Urethral: 950 mL  Total OUT: 1710 mL    Total NET: 1143.8 mL    MEDICATIONS  (STANDING):  sodium chloride 0.9% lock flush 3 milliLiter(s) IV Push every 8 hours  influenza   Vaccine 0.5 milliLiter(s) IntraMuscular once  pantoprazole  Injectable 40 milliGRAM(s) IV Push daily  docusate sodium 100 milliGRAM(s) Oral three times a day  potassium chloride  10 mEq/50 mL IVPB 10 milliEquivalent(s) IV Intermittent every 1 hour  potassium chloride  10 mEq/50 mL IVPB 10 milliEquivalent(s) IV Intermittent every 1 hour  potassium chloride  10 mEq/50 mL IVPB 10 milliEquivalent(s) IV Intermittent once  insulin Infusion 3 Unit(s)/Hr (3 mL/Hr) IV Continuous <Continuous>  metoclopramide Injectable 10 milliGRAM(s) IV Push every 8 hours  sodium chloride 0.9%. 1000 milliLiter(s) (10 mL/Hr) IV Continuous <Continuous>  aspirin enteric coated 81 milliGRAM(s) Oral daily  atorvastatin 80 milliGRAM(s) Oral at bedtime  norepinephrine Infusion 0.08 MICROgram(s)/kG/Min (17.4 mL/Hr) IV Continuous <Continuous>  vasopressin Infusion 0.1 Unit(s)/Min (6 mL/Hr) IV Continuous <Continuous>  milrinone Infusion 0.25 MICROgram(s)/kG/Min (8.709 mL/Hr) IV Continuous <Continuous>  cefuroxime  IVPB 1500 milliGRAM(s) IV Intermittent every 8 hours  DOBUTamine Infusion 3 MICROgram(s)/kG/Min (10.451 mL/Hr) IV Continuous <Continuous>  dexmedetomidine Infusion 0.2 MICROgram(s)/kG/Hr (5.806 mL/Hr) IV Continuous <Continuous>  clopidogrel Tablet 75 milliGRAM(s) Oral daily  dextrose 5%. 1000 milliLiter(s) (50 mL/Hr) IV Continuous <Continuous>  dextrose 50% Injectable 12.5 Gram(s) IV Push once  dextrose 50% Injectable 25 Gram(s) IV Push once  dextrose 50% Injectable 25 Gram(s) IV Push once  aminocaproic acid Infusion 5 Gm/Hr (250 mL/Hr) IV Continuous <Continuous>  potassium chloride  10 mEq/50 mL IVPB 10 milliEquivalent(s) IV Intermittent every 1 hour    MEDICATIONS  (PRN):  lidocaine 1% Injectable 0.2 milliLiter(s) Local Injection once PRN prior IV insertion  meperidine     Injectable 25 milliGRAM(s) IV Push once PRN For Shivering  dextrose Gel 1 Dose(s) Oral once PRN Blood Glucose LESS THAN 70 milliGRAM(s)/deciLiter  glucagon  Injectable 1 milliGRAM(s) IntraMuscular once PRN Glucose <70 milliGRAM(s)/deciLiter    LABS:                      8.6    14.3  )-----------( 126      ( 30 Sep 2017 01:43 )             24.1   09-30    141  |  108  |  10  ----------------------------<  122<H>  4.6   |  24  |  0.65    Ca    7.6<L>      30 Sep 2017 01:43  Phos  2.7     09-30  Mg     2.0     09-30    TPro  4.8<L>  /  Alb  3.1<L>  /  TBili  1.2  /  DBili  x   /  AST  40  /  ALT  14  /  AlkPhos  23<L>  09-30    PHYSICAL EXAM:  General: Intubated, in NAD.  Chest: sternal dressing C/D/I  Heart: S1, S2, RRR. SR 90s. VVI50.  Lungs: CTA B/L, without W/R/R  Abdomen: Soft, ND, NT, positive BS  Extremities' without edema B/L LE, positive DP pulses B/L.   Tubes/lines: RIJ intro, left radial juventino, L pleural chest tube, 2 mediastinal chest tubes, campos    Does the patient have a history of CHF: No  -If yes, systolic or diastolic:  -pre-operative EF: 67%    Extubation within 24 hours: Pending    Does the patient have a history of kidney disease: No  -what is patient's baseline Creatinine: 0.84    Beta Blockers contraindicated for the first 24 hours due to vasopressor/inotropic medication: Pending  -If on pressors, indication:    Did the patient receive transfusion of blood and/or products: Yes  -If yes, indication: ABL anemia    DVT PPX: Venodynes    Shena-operative antibiotics discontinued within 48 hours of CTU admission: Pending    Patient care discussed on morning interdisciplinary rounds with CTS team.

## 2017-09-30 NOTE — PROGRESS NOTE ADULT - PROBLEM SELECTOR PLAN 1
C/w ASA, plavix, statin. C/w dobutamine, wean as hemodynamics tolerate. Start beta blocker once dobutamine weaned and hemodynamics tolerate. Wean to extubate. C/w ASA, plavix, statin. C/w dobutamine and vasopressin, wean as hemodynamics tolerate. Start beta blocker once dobutamine weaned and hemodynamics tolerate. Wean precedex and ventilatory support to extubate.

## 2017-09-30 NOTE — PROGRESS NOTE ADULT - SUBJECTIVE AND OBJECTIVE BOX
FELIPE GUZMAN   MRN#: [id number]     The patient is a 52y Male who was seen, evaluated, & examined with the CTICU staff on rounds and later in the day with a multidisciplinary care plan formulated & implemented.  All available clinical, laboratory, radiographic, pharmacologic, and electrocardiographic data were reviewed & analyzed.      The patient was in the CTICU in critical condition secondary to persistent cardiopulmonary dysfunction, hemodynamically significant anemia/hypovolemia-shock, morbid obesity, persistent cardiovascular dysfunction, and uncontrolled type II Diabetes mellitus-hyperglycemia.      Respiratory status required supplemental oxygen, the following of ABG’s with A-line monitoring, continuous pulse oximetry monitoring, & twice daily Lovenox for support & to evaluate for & prevent further decompensation secondary to persistent cardiopulmonary dysfunction and high risk for VTE.      Invasive hemodynamic monitoring with an A-line was required for the following of continuous MAP/BP monitoring to ensure adequate cardiovascular support and to evaluate for & help prevent decompensation while receiving intermittent volume expansion, an IV Vasopressin drip, and an IV Dobutamine drip secondary to hemodynamically significant hypovolemia-shock, and resolving cardiovascular dysfunction.       Metabolic stability, uncontrolled type II Diabetes mellitus-hyperglycemia, & infection prophylaxis required an IV regular Insulin drip & the following of serial glucose levels to help achieve & maintain euglycemia.      Patient required more than the usual postoperative critical care management and I provided 30 minutes of non-continuous care to the patient.  Discussed at length with the CTICU staff and helped coordinate care.

## 2017-09-30 NOTE — PHYSICAL THERAPY INITIAL EVALUATION ADULT - GENERAL OBSERVATIONS, REHAB EVAL
Pt received seated in chair +cardiac monitor, central line, continuous pulse ox monitor, BP cuff, 4L O2 NC, external pacer, chest tube, b/l venodynes

## 2017-09-30 NOTE — AIRWAY REMOVAL NOTE  ADULT & PEDS - ARTIFICAL AIRWAY REMOVAL COMMENTS
Written order for extubation verified. The patient was identified by full name and birth date compared to the identification band. Present during the procedure was riccardo raza RN

## 2017-09-30 NOTE — PHYSICAL THERAPY INITIAL EVALUATION ADULT - ADDITIONAL COMMENTS
Pt lives in an apartment with his parents. +ramp and elevator access. Previously independent with all functional mobility. Pt states parents can assist pt upon d/c home

## 2017-10-01 DIAGNOSIS — I77.9 DISORDER OF ARTERIES AND ARTERIOLES, UNSPECIFIED: ICD-10-CM

## 2017-10-01 DIAGNOSIS — E78.01 FAMILIAL HYPERCHOLESTEROLEMIA: ICD-10-CM

## 2017-10-01 DIAGNOSIS — E11.59 TYPE 2 DIABETES MELLITUS WITH OTHER CIRCULATORY COMPLICATIONS: ICD-10-CM

## 2017-10-01 LAB
ALBUMIN SERPL ELPH-MCNC: 3.4 G/DL — SIGNIFICANT CHANGE UP (ref 3.3–5)
ALP SERPL-CCNC: 34 U/L — LOW (ref 40–120)
ALT FLD-CCNC: 27 U/L RC — SIGNIFICANT CHANGE UP (ref 10–45)
ANION GAP SERPL CALC-SCNC: 11 MMOL/L — SIGNIFICANT CHANGE UP (ref 5–17)
AST SERPL-CCNC: 48 U/L — HIGH (ref 10–40)
BILIRUB SERPL-MCNC: 0.6 MG/DL — SIGNIFICANT CHANGE UP (ref 0.2–1.2)
BUN SERPL-MCNC: 12 MG/DL — SIGNIFICANT CHANGE UP (ref 7–23)
CALCIUM SERPL-MCNC: 7.8 MG/DL — LOW (ref 8.4–10.5)
CHLORIDE SERPL-SCNC: 104 MMOL/L — SIGNIFICANT CHANGE UP (ref 96–108)
CO2 SERPL-SCNC: 25 MMOL/L — SIGNIFICANT CHANGE UP (ref 22–31)
CREAT SERPL-MCNC: 0.66 MG/DL — SIGNIFICANT CHANGE UP (ref 0.5–1.3)
GAS PNL BLDA: SIGNIFICANT CHANGE UP
GLUCOSE SERPL-MCNC: 182 MG/DL — HIGH (ref 70–99)
HCT VFR BLD CALC: 25.2 % — LOW (ref 39–50)
HGB BLD-MCNC: 8.5 G/DL — LOW (ref 13–17)
MCHC RBC-ENTMCNC: 30.8 PG — SIGNIFICANT CHANGE UP (ref 27–34)
MCHC RBC-ENTMCNC: 33.7 GM/DL — SIGNIFICANT CHANGE UP (ref 32–36)
MCV RBC AUTO: 91.5 FL — SIGNIFICANT CHANGE UP (ref 80–100)
PHOSPHATE SERPL-MCNC: 2 MG/DL — LOW (ref 2.5–4.5)
PLATELET # BLD AUTO: 182 K/UL — SIGNIFICANT CHANGE UP (ref 150–400)
POTASSIUM SERPL-MCNC: 4.1 MMOL/L — SIGNIFICANT CHANGE UP (ref 3.5–5.3)
POTASSIUM SERPL-SCNC: 4.1 MMOL/L — SIGNIFICANT CHANGE UP (ref 3.5–5.3)
PROT SERPL-MCNC: 5.4 G/DL — LOW (ref 6–8.3)
RBC # BLD: 2.76 M/UL — LOW (ref 4.2–5.8)
RBC # FLD: 12.5 % — SIGNIFICANT CHANGE UP (ref 10.3–14.5)
SODIUM SERPL-SCNC: 140 MMOL/L — SIGNIFICANT CHANGE UP (ref 135–145)
WBC # BLD: 19.9 K/UL — HIGH (ref 3.8–10.5)
WBC # FLD AUTO: 19.9 K/UL — HIGH (ref 3.8–10.5)

## 2017-10-01 PROCEDURE — 71010: CPT | Mod: 26

## 2017-10-01 PROCEDURE — 71010: CPT | Mod: 26,77

## 2017-10-01 RX ORDER — HYDROMORPHONE HYDROCHLORIDE 2 MG/ML
0.5 INJECTION INTRAMUSCULAR; INTRAVENOUS; SUBCUTANEOUS ONCE
Qty: 0 | Refills: 0 | Status: DISCONTINUED | OUTPATIENT
Start: 2017-10-01 | End: 2017-10-01

## 2017-10-01 RX ORDER — INSULIN LISPRO 100/ML
10 VIAL (ML) SUBCUTANEOUS
Qty: 0 | Refills: 0 | Status: DISCONTINUED | OUTPATIENT
Start: 2017-10-01 | End: 2017-10-02

## 2017-10-01 RX ORDER — METOCLOPRAMIDE HCL 10 MG
10 TABLET ORAL EVERY 8 HOURS
Qty: 0 | Refills: 0 | Status: COMPLETED | OUTPATIENT
Start: 2017-10-01 | End: 2017-10-02

## 2017-10-01 RX ORDER — OXYCODONE AND ACETAMINOPHEN 5; 325 MG/1; MG/1
2 TABLET ORAL EVERY 4 HOURS
Qty: 0 | Refills: 0 | Status: DISCONTINUED | OUTPATIENT
Start: 2017-10-01 | End: 2017-10-04

## 2017-10-01 RX ORDER — FUROSEMIDE 40 MG
20 TABLET ORAL ONCE
Qty: 0 | Refills: 0 | Status: COMPLETED | OUTPATIENT
Start: 2017-10-01 | End: 2017-10-01

## 2017-10-01 RX ORDER — OXYCODONE AND ACETAMINOPHEN 5; 325 MG/1; MG/1
1 TABLET ORAL EVERY 4 HOURS
Qty: 0 | Refills: 0 | Status: DISCONTINUED | OUTPATIENT
Start: 2017-10-01 | End: 2017-10-04

## 2017-10-01 RX ORDER — METOPROLOL TARTRATE 50 MG
25 TABLET ORAL EVERY 12 HOURS
Qty: 0 | Refills: 0 | Status: DISCONTINUED | OUTPATIENT
Start: 2017-10-01 | End: 2017-10-02

## 2017-10-01 RX ADMIN — Medication 100 MILLIGRAM(S): at 22:35

## 2017-10-01 RX ADMIN — CLOPIDOGREL BISULFATE 75 MILLIGRAM(S): 75 TABLET, FILM COATED ORAL at 11:11

## 2017-10-01 RX ADMIN — Medication 20 MILLIGRAM(S): at 11:11

## 2017-10-01 RX ADMIN — Medication 2: at 22:35

## 2017-10-01 RX ADMIN — Medication 10 MILLIGRAM(S): at 13:43

## 2017-10-01 RX ADMIN — HYDROMORPHONE HYDROCHLORIDE 0.5 MILLIGRAM(S): 2 INJECTION INTRAMUSCULAR; INTRAVENOUS; SUBCUTANEOUS at 03:15

## 2017-10-01 RX ADMIN — ENOXAPARIN SODIUM 30 MILLIGRAM(S): 100 INJECTION SUBCUTANEOUS at 22:34

## 2017-10-01 RX ADMIN — ENOXAPARIN SODIUM 30 MILLIGRAM(S): 100 INJECTION SUBCUTANEOUS at 00:21

## 2017-10-01 RX ADMIN — ENOXAPARIN SODIUM 30 MILLIGRAM(S): 100 INJECTION SUBCUTANEOUS at 11:12

## 2017-10-01 RX ADMIN — Medication 10 MILLIGRAM(S): at 06:33

## 2017-10-01 RX ADMIN — Medication 100 MILLIGRAM(S): at 06:33

## 2017-10-01 RX ADMIN — Medication 1: at 16:43

## 2017-10-01 RX ADMIN — INSULIN HUMAN 2 UNIT(S)/HR: 100 INJECTION, SOLUTION SUBCUTANEOUS at 00:21

## 2017-10-01 RX ADMIN — Medication 10 UNIT(S): at 16:43

## 2017-10-01 RX ADMIN — INSULIN HUMAN 10 UNIT(S): 100 INJECTION, SOLUTION SUBCUTANEOUS at 08:34

## 2017-10-01 RX ADMIN — OXYCODONE AND ACETAMINOPHEN 2 TABLET(S): 5; 325 TABLET ORAL at 12:59

## 2017-10-01 RX ADMIN — Medication 63.75 MILLIMOLE(S): at 06:33

## 2017-10-01 RX ADMIN — INSULIN GLARGINE 30 UNIT(S): 100 INJECTION, SOLUTION SUBCUTANEOUS at 22:34

## 2017-10-01 RX ADMIN — HYDROMORPHONE HYDROCHLORIDE 0.5 MILLIGRAM(S): 2 INJECTION INTRAMUSCULAR; INTRAVENOUS; SUBCUTANEOUS at 13:45

## 2017-10-01 RX ADMIN — Medication 81 MILLIGRAM(S): at 11:11

## 2017-10-01 RX ADMIN — HYDROMORPHONE HYDROCHLORIDE 0.5 MILLIGRAM(S): 2 INJECTION INTRAMUSCULAR; INTRAVENOUS; SUBCUTANEOUS at 03:45

## 2017-10-01 RX ADMIN — PANTOPRAZOLE SODIUM 40 MILLIGRAM(S): 20 TABLET, DELAYED RELEASE ORAL at 06:33

## 2017-10-01 RX ADMIN — ATORVASTATIN CALCIUM 80 MILLIGRAM(S): 80 TABLET, FILM COATED ORAL at 22:35

## 2017-10-01 RX ADMIN — OXYCODONE AND ACETAMINOPHEN 2 TABLET(S): 5; 325 TABLET ORAL at 13:30

## 2017-10-01 RX ADMIN — HYDROMORPHONE HYDROCHLORIDE 0.5 MILLIGRAM(S): 2 INJECTION INTRAMUSCULAR; INTRAVENOUS; SUBCUTANEOUS at 14:00

## 2017-10-01 RX ADMIN — Medication 25 MILLIGRAM(S): at 06:33

## 2017-10-01 RX ADMIN — Medication 100 MILLIGRAM(S): at 13:43

## 2017-10-01 RX ADMIN — Medication 100 MILLIGRAM(S): at 00:20

## 2017-10-01 RX ADMIN — Medication 25 MILLIGRAM(S): at 17:28

## 2017-10-01 RX ADMIN — Medication 10 MILLIGRAM(S): at 22:35

## 2017-10-01 NOTE — PROGRESS NOTE ADULT - ASSESSMENT
Assessment  DMT2: 52y Male with DM T2 with hyperglycemia on insulin, s/p Sx,  blood sugars fluctuating, no hypoglycemia, eating meals.  HTN: Controlled, On med.  HLD:  On meds.  CAD: S/P Sx, Stable, on meds, monitored.

## 2017-10-01 NOTE — PROGRESS NOTE ADULT - SUBJECTIVE AND OBJECTIVE BOX
Chief complaint  Patient is a 52y old  Male who presents with a chief complaint of Chest pain (27 Sep 2017 17:58)   Review of systems  Patient in bed, comfortable, no fever,  no hypoglycemia.    Labs and Fingersticks    CAPILLARY BLOOD GLUCOSE  188 (01 Oct 2017 14:00)  209 (01 Oct 2017 13:00)  149 (01 Oct 2017 12:00)  132 (01 Oct 2017 11:30)  87 (01 Oct 2017 11:00)  165 (01 Oct 2017 10:00)  118 (01 Oct 2017 09:00)  111 (01 Oct 2017 08:00)  116 (01 Oct 2017 07:00)  124 (01 Oct 2017 06:00)  137 (01 Oct 2017 05:00)  140 (01 Oct 2017 04:00)  164 (01 Oct 2017 03:00)  166 (01 Oct 2017 02:00)  169 (01 Oct 2017 01:00)  182 (01 Oct 2017 00:00)  203 (30 Sep 2017 23:00)  196 (30 Sep 2017 22:00)  204 (30 Sep 2017 21:00)  209 (30 Sep 2017 20:00)  230 (30 Sep 2017 19:00)  245 (30 Sep 2017 18:00)  243 (30 Sep 2017 17:00)  137 (30 Sep 2017 12:00)  124 (30 Sep 2017 10:00)  100 (30 Sep 2017 09:00)  103 (30 Sep 2017 08:00)  111 (30 Sep 2017 07:00)  112 (30 Sep 2017 06:00)  136 (30 Sep 2017 05:00)  156 (30 Sep 2017 04:00)  98 (30 Sep 2017 03:00)  113 (30 Sep 2017 02:00)  126 (30 Sep 2017 01:00)  164 (30 Sep 2017 00:00)  149 (29 Sep 2017 23:00)  136 (29 Sep 2017 22:00)  150 (29 Sep 2017 21:00)  163 (29 Sep 2017 20:00)  164 (29 Sep 2017 19:00)  172 (29 Sep 2017 18:00)  202 (29 Sep 2017 17:00)  194 (29 Sep 2017 16:00)  116 (29 Sep 2017 06:50)  116 (29 Sep 2017 06:00)  119 (29 Sep 2017 05:00)  118 (29 Sep 2017 04:00)  122 (29 Sep 2017 03:00)  126 (29 Sep 2017 02:00)  173 (29 Sep 2017 01:02)  247 (29 Sep 2017 00:04)  248 (28 Sep 2017 23:00)  254 (28 Sep 2017 22:04)    Anion Gap, Serum: 11 (10-01 @ 02:54)  Anion Gap, Serum: 9 (09-30 @ 01:43)      Calcium, Total Serum: 7.8 <L> (10-01 @ 02:54)  Calcium, Total Serum: 7.6 <L> (09-30 @ 01:43)  Albumin, Serum: 3.4 (10-01 @ 02:54)  Albumin, Serum: 3.1 <L> (09-30 @ 01:43)    Alanine Aminotransferase (ALT/SGPT): 27 (10-01 @ 02:54)  Alanine Aminotransferase (ALT/SGPT): 14 (09-30 @ 01:43)  Alkaline Phosphatase, Serum: 34 <L> (10-01 @ 02:54)  Alkaline Phosphatase, Serum: 23 <L> (09-30 @ 01:43)  Aspartate Aminotransferase (AST/SGOT): 48 <H> (10-01 @ 02:54)  Aspartate Aminotransferase (AST/SGOT): 40 (09-30 @ 01:43)        10-01    140  |  104  |  12  ----------------------------<  182<H>  4.1   |  25  |  0.66    Ca    7.8<L>      01 Oct 2017 02:54  Phos  2.0     10-01  Mg     2.0     09-30    TPro  5.4<L>  /  Alb  3.4  /  TBili  0.6  /  DBili  x   /  AST  48<H>  /  ALT  27  /  AlkPhos  34<L>  10-01                        8.5    19.9  )-----------( 182      ( 01 Oct 2017 02:54 )             25.2     Medications  MEDICATIONS  (STANDING):  docusate sodium 100 milliGRAM(s) Oral three times a day  aspirin enteric coated 81 milliGRAM(s) Oral daily  atorvastatin 80 milliGRAM(s) Oral at bedtime  clopidogrel Tablet 75 milliGRAM(s) Oral daily  dextrose 5%. 1000 milliLiter(s) (50 mL/Hr) IV Continuous <Continuous>  enoxaparin Injectable 30 milliGRAM(s) SubCutaneous every 12 hours  pantoprazole    Tablet 40 milliGRAM(s) Oral before breakfast  insulin glargine Injectable (LANTUS) 30 Unit(s) SubCutaneous at bedtime  dextrose 50% Injectable 12.5 Gram(s) IV Push once  dextrose 50% Injectable 25 Gram(s) IV Push once  insulin lispro (HumaLOG) corrective regimen sliding scale   SubCutaneous three times a day before meals  insulin lispro (HumaLOG) corrective regimen sliding scale   SubCutaneous at bedtime  metoprolol 25 milliGRAM(s) Oral every 12 hours  insulin lispro Injectable (HumaLOG) 10 Unit(s) SubCutaneous three times a day with meals  metoclopramide Injectable 10 milliGRAM(s) IV Push every 8 hours      Physical Exam  General: Patient comfortable in bed  Vital Signs Last 12 Hrs  T(F): 99 (10-01-17 @ 19:00), Max: 99 (10-01-17 @ 19:00)  HR: 93 (10-01-17 @ 21:00) (84 - 105)  BP: 132/63 (10-01-17 @ 21:00) (106/56 - 144/83)  BP(mean): 90 (10-01-17 @ 21:00) (76 - 108)  RR: 22 (10-01-17 @ 21:00) (17 - 29)  SpO2: 98% (10-01-17 @ 21:00) (98% - 100%)  Neck: No palpable thyroid nodules.  CVS: S1S2, No murmurs  Respiratory: No wheezing, no crepitations  GI: Abdomen soft, bowel sounds positive  Musculoskeletal: Positive edema lower extremities bilaterally  Skin: No skin rashes, no ecchymosis    Diagnostics

## 2017-10-01 NOTE — PROGRESS NOTE ADULT - SUBJECTIVE AND OBJECTIVE BOX
CHIEF COMPLAINT:      PAST MEDICAL & SURGICAL HISTORY:  History of eczema  History of blood clots: s/p Bell&#x27;s palsy, h/o bloot clot, patient reports being treated   History of Bell's palsy:   Sleep apnea: never evaluated, STOP BANG 8, high risk  Bilateral carotid artery disease  History of MI (myocardial infarction): 2012  Stented coronary artery: x 3 ( )  Cornea conical, bilateral: wears corrective lenses  Obesity: morbid, BMI 40  CAD (coronary artery disease): severe  DM (diabetes mellitus): Type II, Hg A1C 12.2 ( 17) , does not check sugar at home regularly  HLD (hyperlipidemia)  HTN (hypertension)  History of coronary artery stent placement: x3 (2012)  Fracture of arm: , left radial fracture/operation/hardware      HPI:                PREVIOUS DIAGNOSTIC TESTING:      ECHO  FINDINGS:    STRESS  FINDINGS:    CATHETERIZATION  FINDINGS:    ELECTROPHYSIOLOGY STUDY  FINDINGS:    CAROTID ULTRASOUND:  FINDINGS    VENOUS DUPLEX SCAN:  FINDINGS:    CHEST CT PULMONARY ANGIO with IV Contrast:  FINDINGS:  MEDICATIONS  (STANDING):  docusate sodium 100 milliGRAM(s) Oral three times a day  metoclopramide Injectable 10 milliGRAM(s) IV Push every 8 hours  aspirin enteric coated 81 milliGRAM(s) Oral daily  atorvastatin 80 milliGRAM(s) Oral at bedtime  clopidogrel Tablet 75 milliGRAM(s) Oral daily  dextrose 5%. 1000 milliLiter(s) (50 mL/Hr) IV Continuous <Continuous>  enoxaparin Injectable 30 milliGRAM(s) SubCutaneous every 12 hours  pantoprazole    Tablet 40 milliGRAM(s) Oral before breakfast  insulin glargine Injectable (LANTUS) 30 Unit(s) SubCutaneous at bedtime  dextrose 50% Injectable 12.5 Gram(s) IV Push once  dextrose 50% Injectable 25 Gram(s) IV Push once  insulin lispro (HumaLOG) corrective regimen sliding scale   SubCutaneous three times a day before meals  insulin lispro (HumaLOG) corrective regimen sliding scale   SubCutaneous at bedtime  insulin Infusion 2 Unit(s)/Hr (2 mL/Hr) IV Continuous <Continuous>  metoprolol 25 milliGRAM(s) Oral every 12 hours  insulin lispro Injectable (HumaLOG) 10 Unit(s) SubCutaneous three times a day with meals    MEDICATIONS  (PRN):  dextrose Gel 1 Dose(s) Oral once PRN Blood Glucose LESS THAN 70 milliGRAM(s)/deciliter  glucagon  Injectable 1 milliGRAM(s) IntraMuscular once PRN Glucose LESS THAN 70 milligrams/deciliter  oxyCODONE    5 mG/acetaminophen 325 mG 1 Tablet(s) Oral every 4 hours PRN Mild Pain (1 - 3)  oxyCODONE    5 mG/acetaminophen 325 mG 2 Tablet(s) Oral every 4 hours PRN Severe Pain (7 - 10)      FAMILY HISTORY:  Family history of heart disease (Grandparent, Father)  Family history of diabetes mellitus (Grandparent, Father)      SOCIAL HISTORY:    CIGARETTES:    ALCOHOL:    REVIEW OF SYSTEMS:    CONSTITUTIONAL: No fever, weight loss, chills, shakes, or fatigue  EYES: No eye pain, visual disturbances, or discharge  ENMT:  No difficulty hearing, tinnitus, vertigo; No sinus or throat pain  NECK: No pain or stiffness  BREASTS: No pain, masses, or nipple discharge  RESPIRATORY: No cough, wheezing, hemoptysis, or shortness of breath  CARDIOVASCULAR: No chest pain, dyspnea, palpitations, dizziness, syncope, paroxysmal nocturnal dyspnea, orthopnea, or arm or leg swelling  GASTROINTESTINAL: No abdominal  or epigastric pain, nausea, vomiting, hematemesis, diarrhea, constipation, melena or bright red blood.  GENITOURINARY: No dysuria, nocturia, hematuria, or urinary incontinence  NEUROLOGICAL: No headaches, memory loss, slurred speech, limb weakness, loss of strength, numbness, or tremors  SKIN: No itching, burning, rashes, or lesions   LYMPH NODES: No enlarged glands  ENDOCRINE: No heat or cold intolerance, or hair loss  MUSCULOSKELETAL: No joint pain or swelling, muscle, back, or extremity pain  PSYCHIATRIC: No depression, anxiety, or difficulty sleeping  HEME/LYMPH: No easy bruising or bleeding gums  ALLERY AND IMMUNOLOGIC: No hives or rash.      Vital Signs Last 24 Hrs  T(C): 37.1 (01 Oct 2017 11:00), Max: 37.7 (01 Oct 2017 00:00)  T(F): 98.8 (01 Oct 2017 11:00), Max: 99.9 (01 Oct 2017 00:00)  HR: 105 (01 Oct 2017 15:00) (83 - 111)  BP: 125/60 (01 Oct 2017 15:00) (106/56 - 156/65)  BP(mean): 86 (01 Oct 2017 15:00) (76 - 108)  RR: 23 (01 Oct 2017 15:00) (13 - 34)  SpO2: 100% (01 Oct 2017 15:00) (95% - 100%)  Daily     Daily Weight in k.6 (01 Oct 2017 00:00)     @ :01  -  10-01 @ 07:00  --------------------------------------------------------  IN: 939.5 mL / OUT: 1290 mL / NET: -350.5 mL    10-01 @ 07:01  -  10-01 @ 15:29  --------------------------------------------------------  IN: 280.5 mL / OUT: 550 mL / NET: -269.5 mL          PHYSICAL EXAM:    GENERAL: In no apparent distress, well nourished, and hydrated.  HEAD:  Atraumatic, Normocephalic  EYES: EOMI, PERRLA, conjunctiva and sclera clear  ENMT: No tonsillar erythema, exudates, or enlargement; Moist mucous membranes, Good dentition, No lesions  NECK: Supple and normal thyroid.  No JVD or carotid bruit.  Carotid pulse is 2+ bilaterally.  HEART: Regular rate and rhythm; No murmurs, rubs, or gallops.  PULMONARY: Clear to auscultation and perfusion.  No rales, wheezing, or rhonchi bilaterally.  ABDOMEN: Soft, Nontender, Nondistended; Bowel sounds present  EXTREMITIES:  2+ Peripheral Pulses, No clubbing, cyanosis, or edema  LYMPH: No lymphadenopathy noted  NEUROLOGICAL: Grossly nonfocal          INTERPRETATION OF TELEMETRY:    ECG:    I&O's Detail    30 Sep 2017 07:01  -  01 Oct 2017 07:00  --------------------------------------------------------  IN:    DOBUTamine Infusion: 10.5 mL    insulin Infusion: 2.5 mL    insulin Infusion: 79 mL    IV PiggyBack: 262.5 mL    Oral Fluid: 330 mL    sodium chloride 0.9%: 240 mL    vasopressin Infusion: 15 mL  Total IN: 939.5 mL    OUT:    Chest Tube: 230 mL    Indwelling Catheter - Urethral: 1060 mL  Total OUT: 1290 mL    Total NET: -350.5 mL      01 Oct 2017 07:01  -  01 Oct 2017 15:29  --------------------------------------------------------  IN:    insulin Infusion: 23 mL    IV PiggyBack: 187.5 mL    sodium chloride 0.9%: 70 mL  Total IN: 280.5 mL    OUT:    Chest Tube: 150 mL    Voided: 400 mL  Total OUT: 550 mL    Total NET: -269.5 mL          LABS:                        8.5    19.9  )-----------( 182      ( 01 Oct 2017 02:54 )             25.2     10-    140  |  104  |  12  ----------------------------<  182<H>  4.1   |  25  |  0.66    Ca    7.8<L>      01 Oct 2017 02:54  Phos  2.0     10-  Mg     2.0     09-    TPro  5.4<L>  /  Alb  3.4  /  TBili  0.6  /  DBili  x   /  AST  48<H>  /  ALT  27  /  AlkPhos  34<L>  10    CARDIAC MARKERS ( 30 Sep 2017 12:21 )  x     / 0.52 ng/mL / 2206 U/L / x     / 16.0 ng/mL  CARDIAC MARKERS ( 30 Sep 2017 01:43 )  x     / 0.76 ng/mL / 1423 U/L / x     / 19.6 ng/mL  CARDIAC MARKERS ( 29 Sep 2017 23:12 )  x     / 0.80 ng/mL / 1187 U/L / x     / 22.2 ng/mL  CARDIAC MARKERS ( 29 Sep 2017 15:58 )  x     / 0.52 ng/mL / 284 U/L / x     / 34.0 ng/mL      PT/INR - ( 30 Sep 2017 01:43 )   PT: 15.9 sec;   INR: 1.46 ratio         PTT - ( 30 Sep 2017 01:43 )  PTT:30.5 sec    BNP  I&O's Detail    30 Sep 2017 07:  -  01 Oct 2017 07:00  --------------------------------------------------------  IN:    DOBUTamine Infusion: 10.5 mL    insulin Infusion: 2.5 mL    insulin Infusion: 79 mL    IV PiggyBack: 262.5 mL    Oral Fluid: 330 mL    sodium chloride 0.9%: 240 mL    vasopressin Infusion: 15 mL  Total IN: 939.5 mL    OUT:    Chest Tube: 230 mL    Indwelling Catheter - Urethral: 1060 mL  Total OUT: 1290 mL    Total NET: -350.5 mL      01 Oct 2017 07:  -  01 Oct 2017 15:29  --------------------------------------------------------  IN:    insulin Infusion: 23 mL    IV PiggyBack: 187.5 mL    sodium chloride 0.9%: 70 mL  Total IN: 280.5 mL    OUT:    Chest Tube: 150 mL    Voided: 400 mL  Total OUT: 550 mL    Total NET: -269.5 mL        Daily     Daily Weight in k.6 (01 Oct 2017 00:00)    RADIOLOGY & ADDITIONAL STUDIES: CHIEF COMPLAINT:  52 year male for history of CABG, 5 vessels.   PAST MEDICAL & SURGICAL HISTORY:  History of eczema  History of blood clots: s/p Bell&#x27;s palsy, h/o bloot clot, patient reports being treated   History of Bell's palsy:   Sleep apnea: never evaluated, STOP BANG 8, high risk  Bilateral carotid artery disease  History of MI (myocardial infarction):   Stented coronary artery: x 3 ( )  Cornea conical, bilateral: wears corrective lenses  Obesity: morbid, BMI 40  CAD (coronary artery disease): severe  DM (diabetes mellitus): Type II, Hg A1C 12.2 ( 17) , does not check sugar at home regularly  HLD (hyperlipidemia)  HTN (hypertension)  History of coronary artery stent placement: x3 ()  Fracture of arm: , left radial fracture/operation/hardware      HPI: 52 year male with DM HTN hyperlipidemia, 5 V CABG post operative day 2 . Noted with very small target vessels, placed back on dual antiplatlet therapy. The  patient  notably complaining of pain moderate in nature, requiring moderate pain treatment. No shortness of breath. Mild sinus tachycardia noted. Restarted on beta blocker therapy                     PREVIOUS DIAGNOSTIC TESTING:      ECHO  FINDINGS:    STRESS  FINDINGS:    CATHETERIZATION  FINDINGS:    ELECTROPHYSIOLOGY STUDY  FINDINGS:    CAROTID ULTRASOUND:  FINDINGS    VENOUS DUPLEX SCAN:  FINDINGS:    CHEST CT PULMONARY ANGIO with IV Contrast:  FINDINGS:  MEDICATIONS  (STANDING):  docusate sodium 100 milliGRAM(s) Oral three times a day  metoclopramide Injectable 10 milliGRAM(s) IV Push every 8 hours  aspirin enteric coated 81 milliGRAM(s) Oral daily  atorvastatin 80 milliGRAM(s) Oral at bedtime  clopidogrel Tablet 75 milliGRAM(s) Oral daily  dextrose 5%. 1000 milliLiter(s) (50 mL/Hr) IV Continuous <Continuous>  enoxaparin Injectable 30 milliGRAM(s) SubCutaneous every 12 hours  pantoprazole    Tablet 40 milliGRAM(s) Oral before breakfast  insulin glargine Injectable (LANTUS) 30 Unit(s) SubCutaneous at bedtime  dextrose 50% Injectable 12.5 Gram(s) IV Push once  dextrose 50% Injectable 25 Gram(s) IV Push once  insulin lispro (HumaLOG) corrective regimen sliding scale   SubCutaneous three times a day before meals  insulin lispro (HumaLOG) corrective regimen sliding scale   SubCutaneous at bedtime  insulin Infusion 2 Unit(s)/Hr (2 mL/Hr) IV Continuous <Continuous>  metoprolol 25 milliGRAM(s) Oral every 12 hours  insulin lispro Injectable (HumaLOG) 10 Unit(s) SubCutaneous three times a day with meals    MEDICATIONS  (PRN):  dextrose Gel 1 Dose(s) Oral once PRN Blood Glucose LESS THAN 70 milliGRAM(s)/deciliter  glucagon  Injectable 1 milliGRAM(s) IntraMuscular once PRN Glucose LESS THAN 70 milligrams/deciliter  oxyCODONE    5 mG/acetaminophen 325 mG 1 Tablet(s) Oral every 4 hours PRN Mild Pain (1 - 3)  oxyCODONE    5 mG/acetaminophen 325 mG 2 Tablet(s) Oral every 4 hours PRN Severe Pain (7 - 10)      FAMILY HISTORY:  Family history of heart disease (Grandparent, Father)  Family history of diabetes mellitus (Grandparent, Father)      SOCIAL HISTORY:    CIGARETTES:    ALCOHOL:    REVIEW OF SYSTEMS:    CONSTITUTIONAL: No fever, weight loss, chills, shakes, or fatigue  EYES: No eye pain, visual disturbances, or discharge  ENMT:  No difficulty hearing, tinnitus, vertigo; No sinus or throat pain  NECK: No pain or stiffness  BREASTS: No pain, masses, or nipple discharge  RESPIRATORY: no shortness of breath  CARDIOVASCULAR:  Moderate chest pain. Feels slight rapid heartbeat. No syncope, paroxysmal nocturnal dyspnea, orthopnea, Mild edema- given lasix  GASTROINTESTINAL: No abdominal  or epigastric pain, nausea, vomiting, hematemesis, diarrhea, constipation, melena or bright red blood.  GENITOURINARY: No dysuria, nocturia, hematuria, or urinary incontinence  NEUROLOGICAL: No headaches, memory loss, slurred speech, limb weakness, loss of strength, numbness, or tremors  SKIN: No itching, burning, rashes, or lesions   LYMPH NODES: No enlarged glands  ENDOCRINE: No heat or cold intolerance, or hair loss  MUSCULOSKELETAL: No joint pain or swelling, muscle, back, or extremity pain  PSYCHIATRIC: No depression, anxiety, or difficulty sleeping  HEME/LYMPH: No easy bruising or bleeding gums  ALLERY AND IMMUNOLOGIC: No hives or rash.      Vital Signs Last 24 Hrs  T(C): 37.1 (01 Oct 2017 11:00), Max: 37.7 (01 Oct 2017 00:00)  T(F): 98.8 (01 Oct 2017 11:00), Max: 99.9 (01 Oct 2017 00:00)  HR: 105 (01 Oct 2017 15:00) (83 - 111)  BP: 125/60 (01 Oct 2017 15:00) (106/56 - 156/65)  BP(mean): 86 (01 Oct 2017 15:00) (76 - 108)  RR: 23 (01 Oct 2017 15:00) (13 - 34)  SpO2: 100% (01 Oct 2017 15:00) (95% - 100%)  Daily     Daily Weight in k.6 (01 Oct 2017 00:00)     @ :01  -  10-01 @ 07:00  --------------------------------------------------------  IN: 939.5 mL / OUT: 1290 mL / NET: -350.5 mL    10-01 @ 07:01  -  10-01 @ 15:29  --------------------------------------------------------  IN: 280.5 mL / OUT: 550 mL / NET: -269.5 mL          PHYSICAL EXAM:    GENERAL: In no apparent distress, well nourished, and hydrated.  HEAD:  Atraumatic, Normocephalic  EYES: EOMI, PERRLA, conjunctiva and sclera clear  ENMT: No tonsillar erythema, exudates, or enlargement; Moist mucous membranes, Good dentition, No lesions  NECK: Supple and normal thyroid.  No JVD or carotid bruit.  Carotid pulse is 2+ bilaterally.  HEART: Regular rate and rhythm; No murmurs, rubs, or gallops.  PULMONARY: occasional crackle, decrClear to auscultation and perfusion.  No rales, wheezing, or rhonchi bilaterally.  ABDOMEN: Soft, Nontender, Nondistended; Bowel sounds present  EXTREMITIES:  2+ Peripheral Pulses, No clubbing, cyanosis, or edema  LYMPH: No lymphadenopathy noted  NEUROLOGICAL: Grossly nonfocal          INTERPRETATION OF TELEMETRY:    ECG:    I&O's Detail    30 Sep 2017 07:01  -  01 Oct 2017 07:00  --------------------------------------------------------  IN:    DOBUTamine Infusion: 10.5 mL    insulin Infusion: 2.5 mL    insulin Infusion: 79 mL    IV PiggyBack: 262.5 mL    Oral Fluid: 330 mL    sodium chloride 0.9%: 240 mL    vasopressin Infusion: 15 mL  Total IN: 939.5 mL    OUT:    Chest Tube: 230 mL    Indwelling Catheter - Urethral: 1060 mL  Total OUT: 1290 mL    Total NET: -350.5 mL      01 Oct 2017 07:01  -  01 Oct 2017 15:29  --------------------------------------------------------  IN:    insulin Infusion: 23 mL    IV PiggyBack: 187.5 mL    sodium chloride 0.9%: 70 mL  Total IN: 280.5 mL    OUT:    Chest Tube: 150 mL    Voided: 400 mL  Total OUT: 550 mL    Total NET: -269.5 mL          LABS:                        8.5    19.9  )-----------( 182      ( 01 Oct 2017 02:54 )             25.2     10-01    140  |  104  |  12  ----------------------------<  182<H>  4.1   |  25  |  0.66    Ca    7.8<L>      01 Oct 2017 02:54  Phos  2.0     10-  Mg     2.0     09-    TPro  5.4<L>  /  Alb  3.4  /  TBili  0.6  /  DBili  x   /  AST  48<H>  /  ALT  27  /  AlkPhos  34<L>  10    CARDIAC MARKERS ( 30 Sep 2017 12:21 )  x     / 0.52 ng/mL / 2206 U/L / x     / 16.0 ng/mL  CARDIAC MARKERS ( 30 Sep 2017 01:43 )  x     / 0.76 ng/mL / 1423 U/L / x     / 19.6 ng/mL  CARDIAC MARKERS ( 29 Sep 2017 23:12 )  x     / 0.80 ng/mL / 1187 U/L / x     / 22.2 ng/mL  CARDIAC MARKERS ( 29 Sep 2017 15:58 )  x     / 0.52 ng/mL / 284 U/L / x     / 34.0 ng/mL      PT/INR - ( 30 Sep 2017 01:43 )   PT: 15.9 sec;   INR: 1.46 ratio         PTT - ( 30 Sep 2017 01:43 )  PTT:30.5 sec    BNP  I&O's Detail    30 Sep 2017 07:  -  01 Oct 2017 07:00  --------------------------------------------------------  IN:    DOBUTamine Infusion: 10.5 mL    insulin Infusion: 2.5 mL    insulin Infusion: 79 mL    IV PiggyBack: 262.5 mL    Oral Fluid: 330 mL    sodium chloride 0.9%: 240 mL    vasopressin Infusion: 15 mL  Total IN: 939.5 mL    OUT:    Chest Tube: 230 mL    Indwelling Catheter - Urethral: 1060 mL  Total OUT: 1290 mL    Total NET: -350.5 mL      01 Oct 2017 07:  -  01 Oct 2017 15:29  --------------------------------------------------------  IN:    insulin Infusion: 23 mL    IV PiggyBack: 187.5 mL    sodium chloride 0.9%: 70 mL  Total IN: 280.5 mL    OUT:    Chest Tube: 150 mL    Voided: 400 mL  Total OUT: 550 mL    Total NET: -269.5 mL        Daily     Daily Weight in k.6 (01 Oct 2017 00:00)    RADIOLOGY & ADDITIONAL STUDIES:

## 2017-10-01 NOTE — PROGRESS NOTE ADULT - ASSESSMENT
52 yrs old male Coronary artery disease involving native coronary artery of native heart with other form of angina pectoris.  Plan: C/w ASA, plavix, statin. C/w dobutamine and vasopressin, wean as hemodynamics tolerate. Start beta blocker once dobutamine weaned and hemodynamics tolerate.   CAD- cont ASA, plavix, lipitor  GI; Protonix  DVTprophiolaxis with lovenox   Glycemic control with insulin , endocrine fp;;pw up

## 2017-10-01 NOTE — PROGRESS NOTE ADULT - SUBJECTIVE AND OBJECTIVE BOX
Operation  POD    Patient is doing well, resting comfortably; complains of mild incisional pain that is relieved by pain medication.    Vital Signs Last 24 Hrs  T(C): 37.7 (01 Oct 2017 00:00), Max: 38.4 (30 Sep 2017 04:00)  T(F): 99.9 (01 Oct 2017 00:00), Max: 101.1 (30 Sep 2017 04:00)  HR: 107 (01 Oct 2017 03:00) (79 - 109)  BP: 139/70 (01 Oct 2017 03:00) (97/47 - 163/73)  BP(mean): 96 (01 Oct 2017 03:00) (68 - 107)  RR: 27 (01 Oct 2017 03:00) (11 - 53)  SpO2: 98% (01 Oct 2017 03:00) (91% - 100%)  I&O's Detail    29 Sep 2017 07:01  -  30 Sep 2017 07:00  --------------------------------------------------------  IN:    Albumin 5%  - 250 mL: 1250 mL    aminocaproic acid Infusion: 250 mL    dexmedetomidine Infusion: 270.2 mL    DOBUTamine Infusion: 12.5 mL    DOBUTamine Infusion: 3 mL    DOBUTamine Infusion: 38.2 mL    DOBUTamine Infusion: 74.4 mL    insulin Infusion: 95 mL    IV PiggyBack: 400 mL    milrinone  Infusion: 17.4 mL    norepinephrine Infusion: 85.8 mL    Packed Red Blood Cells: 800 mL    sodium chloride 0.9%.: 170 mL    vasopressin Infusion: 39 mL  Total IN: 3505.5 mL    OUT:    Chest Tube: 460 mL    Chest Tube: 450 mL    Indwelling Catheter - Urethral: 2045 mL  Total OUT: 2955 mL    Total NET: 550.5 mL      30 Sep 2017 07:01  -  01 Oct 2017 03:20  --------------------------------------------------------  IN:    DOBUTamine Infusion: 10.5 mL    insulin Infusion: 2.5 mL    insulin Infusion: 52 mL    IV PiggyBack: 200 mL    Oral Fluid: 330 mL    sodium chloride 0.9%.: 190 mL    vasopressin Infusion: 15 mL  Total IN: 800 mL    OUT:    Chest Tube: 200 mL    Indwelling Catheter - Urethral: 890 mL  Total OUT: 1090 mL    Total NET: -290 mL          CHEST TUBE:  on Suction  LOGAN DRAIN:    EPICARDIAL WIRES: Ventricular wires   MEJIA: Yes/No.    MEDICATIONS  (STANDING):  docusate sodium 100 milliGRAM(s) Oral three times a day  metoclopramide Injectable 10 milliGRAM(s) IV Push every 8 hours  sodium chloride 0.9%. 1000 milliLiter(s) (10 mL/Hr) IV Continuous <Continuous>  aspirin enteric coated 81 milliGRAM(s) Oral daily  atorvastatin 80 milliGRAM(s) Oral at bedtime  clopidogrel Tablet 75 milliGRAM(s) Oral daily  dextrose 5%. 1000 milliLiter(s) (50 mL/Hr) IV Continuous <Continuous>  enoxaparin Injectable 30 milliGRAM(s) SubCutaneous every 12 hours  pantoprazole    Tablet 40 milliGRAM(s) Oral before breakfast  insulin glargine Injectable (LANTUS) 30 Unit(s) SubCutaneous at bedtime  insulin regular  human recombinant 10 Unit(s) SubCutaneous three times a day before meals  dextrose 50% Injectable 12.5 Gram(s) IV Push once  dextrose 50% Injectable 25 Gram(s) IV Push once  insulin lispro (HumaLOG) corrective regimen sliding scale   SubCutaneous three times a day before meals  insulin lispro (HumaLOG) corrective regimen sliding scale   SubCutaneous at bedtime  insulin Infusion 2 Unit(s)/Hr (2 mL/Hr) IV Continuous <Continuous>  metoprolol 25 milliGRAM(s) Oral every 12 hours    MEDICATIONS  (PRN):  dextrose Gel 1 Dose(s) Oral once PRN Blood Glucose LESS THAN 70 milliGRAM(s)/deciliter  glucagon  Injectable 1 milliGRAM(s) IntraMuscular once PRN Glucose LESS THAN 70 milligrams/deciliter      General: A+Ox3, in NAD  Chest: sternal dressing C/D/I  Heart: S1, S2, RRR  Lungs: CTA B/L, without W/R/R  Abdomen: Soft, ND, NT, positive BS  Extremeties: without edema B/L LE, positive DP pulses B/L     Does the patient have a history of CHF?  -If yes, systolic or diastolic  -pre-operative EF    Extubation within 24 hours? yes    CXR reviewed with attending.     Does the patient have a history of kidney disease? no  -give CKD stage if applicable  -what is patient's baseline Creatinine     Beta Blockers contraindicated for the first 24 hours due to vasopressor/inotropic medication      Did the patient receive transfusion of blood and/or products? yes  -If yes, indication anemia due to acute blood loss    DVT PPX with vendodynes as well as chemical prophylaxis.    Shena-operative antibiotics to be discontinued within 48 hours of CTU admission    Patient care discussed on morning interdisciplinary rounds with CTS team. Operation; C%L   POD; 2    Patient is doing well, resting comfortably; complains of mild incisional pain that is relieved by pain medication.    Vital Signs Last 24 Hrs  T(C): 37.7 (01 Oct 2017 00:00), Max: 38.4 (30 Sep 2017 04:00)  T(F): 99.9 (01 Oct 2017 00:00), Max: 101.1 (30 Sep 2017 04:00)  HR: 107 (01 Oct 2017 03:00) (79 - 109)  BP: 139/70 (01 Oct 2017 03:00) (97/47 - 163/73)  BP(mean): 96 (01 Oct 2017 03:00) (68 - 107)  RR: 27 (01 Oct 2017 03:00) (11 - 53)  SpO2: 98% (01 Oct 2017 03:00) (91% - 100%)  I&O's Detail    29 Sep 2017 07:01  -  30 Sep 2017 07:00  --------------------------------------------------------  IN:    Albumin 5%  - 250 mL: 1250 mL    aminocaproic acid Infusion: 250 mL    dexmedetomidine Infusion: 270.2 mL    DOBUTamine Infusion: 12.5 mL    DOBUTamine Infusion: 3 mL    DOBUTamine Infusion: 38.2 mL    DOBUTamine Infusion: 74.4 mL    insulin Infusion: 95 mL    IV PiggyBack: 400 mL    milrinone  Infusion: 17.4 mL    norepinephrine Infusion: 85.8 mL    Packed Red Blood Cells: 800 mL    sodium chloride 0.9%.: 170 mL    vasopressin Infusion: 39 mL  Total IN: 3505.5 mL    OUT:    Chest Tube: 460 mL    Chest Tube: 450 mL    Indwelling Catheter - Urethral: 2045 mL  Total OUT: 2955 mL    Total NET: 550.5 mL      30 Sep 2017 07:01  -  01 Oct 2017 03:20  --------------------------------------------------------  IN:    DOBUTamine Infusion: 10.5 mL    insulin Infusion: 2.5 mL    insulin Infusion: 52 mL    IV PiggyBack: 200 mL    Oral Fluid: 330 mL    sodium chloride 0.9%.: 190 mL    vasopressin Infusion: 15 mL  Total IN: 800 mL    OUT:    Chest Tube: 200 mL    Indwelling Catheter - Urethral: 890 mL  Total OUT: 1090 mL    Total NET: -290 mL          CHEST TUBE:  on Suction  LOGAN DRAIN:    EPICARDIAL WIRES: Ventricular wires   MEJIA: Yes/No.    MEDICATIONS  (STANDING):  docusate sodium 100 milliGRAM(s) Oral three times a day  metoclopramide Injectable 10 milliGRAM(s) IV Push every 8 hours  sodium chloride 0.9%. 1000 milliLiter(s) (10 mL/Hr) IV Continuous <Continuous>  aspirin enteric coated 81 milliGRAM(s) Oral daily  atorvastatin 80 milliGRAM(s) Oral at bedtime  clopidogrel Tablet 75 milliGRAM(s) Oral daily  dextrose 5%. 1000 milliLiter(s) (50 mL/Hr) IV Continuous <Continuous>  enoxaparin Injectable 30 milliGRAM(s) SubCutaneous every 12 hours  pantoprazole    Tablet 40 milliGRAM(s) Oral before breakfast  insulin glargine Injectable (LANTUS) 30 Unit(s) SubCutaneous at bedtime  insulin regular  human recombinant 10 Unit(s) SubCutaneous three times a day before meals  dextrose 50% Injectable 12.5 Gram(s) IV Push once  dextrose 50% Injectable 25 Gram(s) IV Push once  insulin lispro (HumaLOG) corrective regimen sliding scale   SubCutaneous three times a day before meals  insulin lispro (HumaLOG) corrective regimen sliding scale   SubCutaneous at bedtime  insulin Infusion 2 Unit(s)/Hr (2 mL/Hr) IV Continuous <Continuous>  metoprolol 25 milliGRAM(s) Oral every 12 hours    MEDICATIONS  (PRN):  dextrose Gel 1 Dose(s) Oral once PRN Blood Glucose LESS THAN 70 milliGRAM(s)/deciliter  glucagon  Injectable 1 milliGRAM(s) IntraMuscular once PRN Glucose LESS THAN 70 milligrams/deciliter      General: A+Ox3, in NAD  Chest: sternal dressing C/D/I  Heart: S1, S2, RRR  Lungs: CTA B/L, without W/R/R  Abdomen: Soft, ND, NT, positive BS  Extremeties: without edema B/L LE, positive DP pulses B/L     Does the patient have a history of CHF?  -If yes, systolic or diastolic  -pre-operative EF    Extubation within 24 hours? yes    CXR reviewed with attending.     Does the patient have a history of kidney disease? no  -give CKD stage if applicable  -what is patient's baseline Creatinine     Beta Blockers contraindicated for the first 24 hours due to vasopressor/inotropic medication      Did the patient receive transfusion of blood and/or products? yes  -If yes, indication anemia due to acute blood loss    DVT PPX with vendodynes as well as chemical prophylaxis.    Shena-operative antibiotics to be discontinued within 48 hours of CTU admission    Patient care discussed on morning interdisciplinary rounds with CTS team. Operation; C5L  POD; 2    Patient is doing well, resting comfortably; complains of mild incisional pain that is relieved by pain medication.    Vital Signs Last 24 Hrs  T(C): 37.7 (01 Oct 2017 00:00), Max: 38.4 (30 Sep 2017 04:00)  T(F): 99.9 (01 Oct 2017 00:00), Max: 101.1 (30 Sep 2017 04:00)  HR: 107 (01 Oct 2017 03:00) (79 - 109)  BP: 139/70 (01 Oct 2017 03:00) (97/47 - 163/73)  BP(mean): 96 (01 Oct 2017 03:00) (68 - 107)  RR: 27 (01 Oct 2017 03:00) (11 - 53)  SpO2: 98% (01 Oct 2017 03:00) (91% - 100%)  I&O's Detail    29 Sep 2017 07:01  -  30 Sep 2017 07:00  --------------------------------------------------------  IN:    Albumin 5%  - 250 mL: 1250 mL    aminocaproic acid Infusion: 250 mL    dexmedetomidine Infusion: 270.2 mL    DOBUTamine Infusion: 12.5 mL    DOBUTamine Infusion: 3 mL    DOBUTamine Infusion: 38.2 mL    DOBUTamine Infusion: 74.4 mL    insulin Infusion: 95 mL    IV PiggyBack: 400 mL    milrinone  Infusion: 17.4 mL    norepinephrine Infusion: 85.8 mL    Packed Red Blood Cells: 800 mL    sodium chloride 0.9%.: 170 mL    vasopressin Infusion: 39 mL  Total IN: 3505.5 mL    OUT:    Chest Tube: 460 mL    Chest Tube: 450 mL    Indwelling Catheter - Urethral: 2045 mL  Total OUT: 2955 mL    Total NET: 550.5 mL      30 Sep 2017 07:01  -  01 Oct 2017 03:20  --------------------------------------------------------  IN:    DOBUTamine Infusion: 10.5 mL    insulin Infusion: 2.5 mL    insulin Infusion: 52 mL    IV PiggyBack: 200 mL    Oral Fluid: 330 mL    sodium chloride 0.9%.: 190 mL    vasopressin Infusion: 15 mL  Total IN: 800 mL    OUT:    Chest Tube: 200 mL    Indwelling Catheter - Urethral: 890 mL  Total OUT: 1090 mL    Total NET: -290 mL          CHEST TUBE:  on Suction  LOGAN DRAIN:    EPICARDIAL WIRES: Ventricular wires   MEJIA: Yes/No.    MEDICATIONS  (STANDING):  docusate sodium 100 milliGRAM(s) Oral three times a day  metoclopramide Injectable 10 milliGRAM(s) IV Push every 8 hours  sodium chloride 0.9%. 1000 milliLiter(s) (10 mL/Hr) IV Continuous <Continuous>  aspirin enteric coated 81 milliGRAM(s) Oral daily  atorvastatin 80 milliGRAM(s) Oral at bedtime  clopidogrel Tablet 75 milliGRAM(s) Oral daily  dextrose 5%. 1000 milliLiter(s) (50 mL/Hr) IV Continuous <Continuous>  enoxaparin Injectable 30 milliGRAM(s) SubCutaneous every 12 hours  pantoprazole    Tablet 40 milliGRAM(s) Oral before breakfast  insulin glargine Injectable (LANTUS) 30 Unit(s) SubCutaneous at bedtime  insulin regular  human recombinant 10 Unit(s) SubCutaneous three times a day before meals  dextrose 50% Injectable 12.5 Gram(s) IV Push once  dextrose 50% Injectable 25 Gram(s) IV Push once  insulin lispro (HumaLOG) corrective regimen sliding scale   SubCutaneous three times a day before meals  insulin lispro (HumaLOG) corrective regimen sliding scale   SubCutaneous at bedtime  insulin Infusion 2 Unit(s)/Hr (2 mL/Hr) IV Continuous <Continuous>  metoprolol 25 milliGRAM(s) Oral every 12 hours    MEDICATIONS  (PRN):  dextrose Gel 1 Dose(s) Oral once PRN Blood Glucose LESS THAN 70 milliGRAM(s)/deciliter  glucagon  Injectable 1 milliGRAM(s) IntraMuscular once PRN Glucose LESS THAN 70 milligrams/deciliter      General: A+Ox3, in NAD  Chest: sternal dressing C/D/I  Heart: S1, S2, RRR  Lungs: CTA B/L, without W/R/R  Abdomen: Soft, ND, NT, positive BS  Extremeties: without edema B/L LE, positive DP pulses B/L     Does the patient have a history of CHF?  -If yes, systolic or diastolic  -pre-operative EF    Extubation within 24 hours? yes    CXR reviewed with attending.     Does the patient have a history of kidney disease? no  -give CKD stage if applicable  -what is patient's baseline Creatinine     Beta Blockers contraindicated for the first 24 hours due to vasopressor/inotropic medication      Did the patient receive transfusion of blood and/or products? yes  -If yes, indication anemia due to acute blood loss    DVT PPX with vendodynes as well as chemical prophylaxis.    Shena-operative antibiotics to be discontinued within 48 hours of CTU admission    Patient care discussed on morning interdisciplinary rounds with CTS team.

## 2017-10-02 DIAGNOSIS — Z95.1 PRESENCE OF AORTOCORONARY BYPASS GRAFT: ICD-10-CM

## 2017-10-02 LAB
ALBUMIN SERPL ELPH-MCNC: 3.4 G/DL — SIGNIFICANT CHANGE UP (ref 3.3–5)
ALP SERPL-CCNC: 43 U/L — SIGNIFICANT CHANGE UP (ref 40–120)
ALT FLD-CCNC: 27 U/L RC — SIGNIFICANT CHANGE UP (ref 10–45)
ANION GAP SERPL CALC-SCNC: 11 MMOL/L — SIGNIFICANT CHANGE UP (ref 5–17)
AST SERPL-CCNC: 37 U/L — SIGNIFICANT CHANGE UP (ref 10–40)
BILIRUB SERPL-MCNC: 0.7 MG/DL — SIGNIFICANT CHANGE UP (ref 0.2–1.2)
BUN SERPL-MCNC: 13 MG/DL — SIGNIFICANT CHANGE UP (ref 7–23)
CALCIUM SERPL-MCNC: 7.7 MG/DL — LOW (ref 8.4–10.5)
CHLORIDE SERPL-SCNC: 101 MMOL/L — SIGNIFICANT CHANGE UP (ref 96–108)
CO2 SERPL-SCNC: 25 MMOL/L — SIGNIFICANT CHANGE UP (ref 22–31)
CREAT SERPL-MCNC: 0.61 MG/DL — SIGNIFICANT CHANGE UP (ref 0.5–1.3)
GLUCOSE SERPL-MCNC: 245 MG/DL — HIGH (ref 70–99)
HCT VFR BLD CALC: 23.8 % — LOW (ref 39–50)
HGB BLD-MCNC: 8 G/DL — LOW (ref 13–17)
MCHC RBC-ENTMCNC: 31.3 PG — SIGNIFICANT CHANGE UP (ref 27–34)
MCHC RBC-ENTMCNC: 33.7 GM/DL — SIGNIFICANT CHANGE UP (ref 32–36)
MCV RBC AUTO: 92.9 FL — SIGNIFICANT CHANGE UP (ref 80–100)
PHOSPHATE SERPL-MCNC: 1.5 MG/DL — LOW (ref 2.5–4.5)
PLATELET # BLD AUTO: 196 K/UL — SIGNIFICANT CHANGE UP (ref 150–400)
POTASSIUM SERPL-MCNC: 4.2 MMOL/L — SIGNIFICANT CHANGE UP (ref 3.5–5.3)
POTASSIUM SERPL-SCNC: 4.2 MMOL/L — SIGNIFICANT CHANGE UP (ref 3.5–5.3)
PROT SERPL-MCNC: 5.9 G/DL — LOW (ref 6–8.3)
RBC # BLD: 2.56 M/UL — LOW (ref 4.2–5.8)
RBC # FLD: 12.5 % — SIGNIFICANT CHANGE UP (ref 10.3–14.5)
SODIUM SERPL-SCNC: 137 MMOL/L — SIGNIFICANT CHANGE UP (ref 135–145)
WBC # BLD: 19.8 K/UL — HIGH (ref 3.8–10.5)
WBC # FLD AUTO: 19.8 K/UL — HIGH (ref 3.8–10.5)

## 2017-10-02 PROCEDURE — 71010: CPT | Mod: 26

## 2017-10-02 RX ORDER — SODIUM CHLORIDE 9 MG/ML
3 INJECTION INTRAMUSCULAR; INTRAVENOUS; SUBCUTANEOUS EVERY 8 HOURS
Qty: 0 | Refills: 0 | Status: DISCONTINUED | OUTPATIENT
Start: 2017-10-02 | End: 2017-10-04

## 2017-10-02 RX ORDER — INSULIN GLARGINE 100 [IU]/ML
40 INJECTION, SOLUTION SUBCUTANEOUS AT BEDTIME
Qty: 0 | Refills: 0 | Status: DISCONTINUED | OUTPATIENT
Start: 2017-10-02 | End: 2017-10-02

## 2017-10-02 RX ORDER — INSULIN GLARGINE 100 [IU]/ML
56 INJECTION, SOLUTION SUBCUTANEOUS AT BEDTIME
Qty: 0 | Refills: 0 | Status: DISCONTINUED | OUTPATIENT
Start: 2017-10-02 | End: 2017-10-04

## 2017-10-02 RX ORDER — ASCORBIC ACID 60 MG
500 TABLET,CHEWABLE ORAL DAILY
Qty: 0 | Refills: 0 | Status: DISCONTINUED | OUTPATIENT
Start: 2017-10-02 | End: 2017-10-04

## 2017-10-02 RX ORDER — FERROUS SULFATE 325(65) MG
325 TABLET ORAL
Qty: 0 | Refills: 0 | Status: DISCONTINUED | OUTPATIENT
Start: 2017-10-02 | End: 2017-10-04

## 2017-10-02 RX ORDER — INSULIN LISPRO 100/ML
16 VIAL (ML) SUBCUTANEOUS
Qty: 0 | Refills: 0 | Status: DISCONTINUED | OUTPATIENT
Start: 2017-10-02 | End: 2017-10-04

## 2017-10-02 RX ORDER — INSULIN LISPRO 100/ML
VIAL (ML) SUBCUTANEOUS AT BEDTIME
Qty: 0 | Refills: 0 | Status: DISCONTINUED | OUTPATIENT
Start: 2017-10-02 | End: 2017-10-04

## 2017-10-02 RX ORDER — METFORMIN HYDROCHLORIDE 850 MG/1
1000 TABLET ORAL
Qty: 0 | Refills: 0 | Status: DISCONTINUED | OUTPATIENT
Start: 2017-10-02 | End: 2017-10-02

## 2017-10-02 RX ORDER — METOPROLOL TARTRATE 50 MG
25 TABLET ORAL EVERY 8 HOURS
Qty: 0 | Refills: 0 | Status: DISCONTINUED | OUTPATIENT
Start: 2017-10-02 | End: 2017-10-04

## 2017-10-02 RX ORDER — FOLIC ACID 0.8 MG
1 TABLET ORAL DAILY
Qty: 0 | Refills: 0 | Status: DISCONTINUED | OUTPATIENT
Start: 2017-10-02 | End: 2017-10-04

## 2017-10-02 RX ORDER — HYDROMORPHONE HYDROCHLORIDE 2 MG/ML
1 INJECTION INTRAMUSCULAR; INTRAVENOUS; SUBCUTANEOUS ONCE
Qty: 0 | Refills: 0 | Status: DISCONTINUED | OUTPATIENT
Start: 2017-10-02 | End: 2017-10-02

## 2017-10-02 RX ORDER — INSULIN LISPRO 100/ML
VIAL (ML) SUBCUTANEOUS
Qty: 0 | Refills: 0 | Status: DISCONTINUED | OUTPATIENT
Start: 2017-10-02 | End: 2017-10-04

## 2017-10-02 RX ORDER — SENNA PLUS 8.6 MG/1
2 TABLET ORAL AT BEDTIME
Qty: 0 | Refills: 0 | Status: DISCONTINUED | OUTPATIENT
Start: 2017-10-02 | End: 2017-10-04

## 2017-10-02 RX ADMIN — METFORMIN HYDROCHLORIDE 1000 MILLIGRAM(S): 850 TABLET ORAL at 08:41

## 2017-10-02 RX ADMIN — Medication 81 MILLIGRAM(S): at 13:30

## 2017-10-02 RX ADMIN — Medication 10 MILLIGRAM(S): at 05:25

## 2017-10-02 RX ADMIN — PANTOPRAZOLE SODIUM 40 MILLIGRAM(S): 20 TABLET, DELAYED RELEASE ORAL at 08:43

## 2017-10-02 RX ADMIN — Medication 2: at 08:30

## 2017-10-02 RX ADMIN — OXYCODONE AND ACETAMINOPHEN 2 TABLET(S): 5; 325 TABLET ORAL at 14:23

## 2017-10-02 RX ADMIN — HYDROMORPHONE HYDROCHLORIDE 1 MILLIGRAM(S): 2 INJECTION INTRAMUSCULAR; INTRAVENOUS; SUBCUTANEOUS at 02:15

## 2017-10-02 RX ADMIN — Medication 10 UNIT(S): at 13:00

## 2017-10-02 RX ADMIN — INSULIN GLARGINE 56 UNIT(S): 100 INJECTION, SOLUTION SUBCUTANEOUS at 22:10

## 2017-10-02 RX ADMIN — SODIUM CHLORIDE 3 MILLILITER(S): 9 INJECTION INTRAMUSCULAR; INTRAVENOUS; SUBCUTANEOUS at 13:32

## 2017-10-02 RX ADMIN — Medication 25 MILLIGRAM(S): at 22:11

## 2017-10-02 RX ADMIN — Medication 10 UNIT(S): at 08:30

## 2017-10-02 RX ADMIN — ENOXAPARIN SODIUM 30 MILLIGRAM(S): 100 INJECTION SUBCUTANEOUS at 09:53

## 2017-10-02 RX ADMIN — OXYCODONE AND ACETAMINOPHEN 2 TABLET(S): 5; 325 TABLET ORAL at 23:45

## 2017-10-02 RX ADMIN — Medication 500 MILLIGRAM(S): at 13:57

## 2017-10-02 RX ADMIN — Medication 16 UNIT(S): at 17:13

## 2017-10-02 RX ADMIN — Medication 25 MILLIGRAM(S): at 13:30

## 2017-10-02 RX ADMIN — OXYCODONE AND ACETAMINOPHEN 2 TABLET(S): 5; 325 TABLET ORAL at 19:40

## 2017-10-02 RX ADMIN — OXYCODONE AND ACETAMINOPHEN 2 TABLET(S): 5; 325 TABLET ORAL at 19:08

## 2017-10-02 RX ADMIN — SENNA PLUS 2 TABLET(S): 8.6 TABLET ORAL at 22:10

## 2017-10-02 RX ADMIN — Medication 63.75 MILLIMOLE(S): at 05:26

## 2017-10-02 RX ADMIN — Medication 100 MILLIGRAM(S): at 05:26

## 2017-10-02 RX ADMIN — Medication 100 MILLIGRAM(S): at 22:10

## 2017-10-02 RX ADMIN — Medication 1 MILLIGRAM(S): at 13:57

## 2017-10-02 RX ADMIN — HYDROMORPHONE HYDROCHLORIDE 1 MILLIGRAM(S): 2 INJECTION INTRAMUSCULAR; INTRAVENOUS; SUBCUTANEOUS at 02:00

## 2017-10-02 RX ADMIN — Medication 25 MILLIGRAM(S): at 05:26

## 2017-10-02 RX ADMIN — Medication 6: at 12:51

## 2017-10-02 RX ADMIN — ATORVASTATIN CALCIUM 80 MILLIGRAM(S): 80 TABLET, FILM COATED ORAL at 22:11

## 2017-10-02 RX ADMIN — CLOPIDOGREL BISULFATE 75 MILLIGRAM(S): 75 TABLET, FILM COATED ORAL at 13:30

## 2017-10-02 RX ADMIN — SODIUM CHLORIDE 3 MILLILITER(S): 9 INJECTION INTRAMUSCULAR; INTRAVENOUS; SUBCUTANEOUS at 22:09

## 2017-10-02 RX ADMIN — Medication 100 MILLIGRAM(S): at 13:30

## 2017-10-02 RX ADMIN — OXYCODONE AND ACETAMINOPHEN 2 TABLET(S): 5; 325 TABLET ORAL at 15:15

## 2017-10-02 RX ADMIN — Medication 4: at 17:13

## 2017-10-02 RX ADMIN — ENOXAPARIN SODIUM 30 MILLIGRAM(S): 100 INJECTION SUBCUTANEOUS at 22:10

## 2017-10-02 RX ADMIN — Medication 325 MILLIGRAM(S): at 17:13

## 2017-10-02 NOTE — DIETITIAN INITIAL EVALUATION ADULT. - OTHER INFO
Pt denies changes in weight PTA. Noted admission weight 248pounds and current weight 274pounds, suspect weight gain during admission related to intraoperative fluid shifts. Pt reports food allergy to most fish, reaction is mouth/lip swelling and a rash; however, pt states he is able to eat tuna and cod. Supplementation PTA consisted of vitamin C, vitamin D, zinc, magnesium, and a multivitamin. Pt reports self monitoring BG 2 times/day with results ranging from 140-150mg/dL fasting and 180-190mg/dL 2 hours after a meal. Currently, pt reports having a decreased appetite but is eating ~75% of meals. Pt states this is less food than he normally eats. Pt reports intermittent nausea, no vomiting. Reports constipation with last BM passed 5 days ago. Pt denies difficulty chewing/swallowing.

## 2017-10-02 NOTE — DIETITIAN INITIAL EVALUATION ADULT. - NS AS NUTRI INTERV MEALS SNACK3
Recommend change diet to consistent CHO, DASH. Encourage PO intake while controlling portion sizes. Provide food preferences within therapeutic diet when requested. Reviewed alternate menu options and menu ordering procedure. Discussed importance of increased PO intake of protein rich foods postoperatively.

## 2017-10-02 NOTE — PROGRESS NOTE ADULT - PROBLEM SELECTOR PLAN 1
continue ASA, plavix, beta blocker. will check orthostatics, echo postoperative. Hold B blocker for SBP less than 100mmHg. noted hypoxia, increased WBC LLL infiltrate- pneumonia verses atelectasis, CXR ordered, followup as per CT surgery

## 2017-10-02 NOTE — PROGRESS NOTE ADULT - ASSESSMENT
52 yrs old S/p C5L , DM  hemodynamics tolerate.   Start beta blocker once dobutamine   CAD- cont ASA, plavix, lipitor  GI; Protonix  DVTprophiolaxis with lovenox   Glycemic control with insulin , endocrine follow up  May transfer to floor

## 2017-10-02 NOTE — PROGRESS NOTE ADULT - SUBJECTIVE AND OBJECTIVE BOX
INTERVAL HPI/OVERNIGHT EVENTS: patient noted dizzy upon standing, intermittently short of breath , feels "rattling" in chest. 02 sat 85-91% on room air with LLL infiltrate verses atelectasis increased WBC, hemoglobin 8.    MEDICATIONS  (STANDING):  docusate sodium 100 milliGRAM(s) Oral three times a day  aspirin enteric coated 81 milliGRAM(s) Oral daily  atorvastatin 80 milliGRAM(s) Oral at bedtime  clopidogrel Tablet 75 milliGRAM(s) Oral daily  dextrose 5%. 1000 milliLiter(s) (50 mL/Hr) IV Continuous <Continuous>  enoxaparin Injectable 30 milliGRAM(s) SubCutaneous every 12 hours  pantoprazole    Tablet 40 milliGRAM(s) Oral before breakfast  dextrose 50% Injectable 12.5 Gram(s) IV Push once  dextrose 50% Injectable 25 Gram(s) IV Push once  metoprolol 25 milliGRAM(s) Oral every 8 hours  ferrous    sulfate 325 milliGRAM(s) Oral two times a day with meals  insulin lispro (HumaLOG) corrective regimen sliding scale   SubCutaneous three times a day before meals  insulin lispro (HumaLOG) corrective regimen sliding scale   SubCutaneous at bedtime  folic acid 1 milliGRAM(s) Oral daily  ascorbic acid 500 milliGRAM(s) Oral daily  senna 2 Tablet(s) Oral at bedtime  sodium chloride 0.9% lock flush 3 milliLiter(s) IV Push every 8 hours  insulin lispro Injectable (HumaLOG) 16 Unit(s) SubCutaneous three times a day with meals  insulin glargine Injectable (LANTUS) 56 Unit(s) SubCutaneous at bedtime    MEDICATIONS  (PRN):  dextrose Gel 1 Dose(s) Oral once PRN Blood Glucose LESS THAN 70 milliGRAM(s)/deciliter  glucagon  Injectable 1 milliGRAM(s) IntraMuscular once PRN Glucose LESS THAN 70 milligrams/deciliter  oxyCODONE    5 mG/acetaminophen 325 mG 1 Tablet(s) Oral every 4 hours PRN Mild Pain (1 - 3)  oxyCODONE    5 mG/acetaminophen 325 mG 2 Tablet(s) Oral every 4 hours PRN Severe Pain (7 - 10)      Allergies    No Known Allergies    Intolerances      ROS:  General: Pt denies recent weight loss/fever/chills. feels fatigued, dizzy upon standing    Neurological: denies numbness or  sensation loss    HEENT: denies visual changes, no hearing loss, denies sore throat    Cardiovascular: denies chest pain/palpitations/leg edema    Respiratory and Thorax: denies SOB/cough/wheezing    Gastrointestinal: denies abdominal pain/diarrhea/constipation/bloody stool    Genitourinary: denies urinary frequency/urgency/ dysuria    Musculoskeletal: denies joint pain or swelling, denies restricted motion    Skin: denies rashes/sores    Endocrine: denies heat or cold intolerance/excessive thirst    Hematologic: denies abnormal bleeding  	    	  	    		        	    	            Vital Signs Last 24 Hrs  T(C): 37.2 (02 Oct 2017 20:00), Max: 37.3 (02 Oct 2017 10:30)  T(F): 98.9 (02 Oct 2017 20:00), Max: 99.1 (02 Oct 2017 10:30)  HR: 89 (02 Oct 2017 20:00) (80 - 112)  BP: 104/69 (02 Oct 2017 20:00) (103/69 - 149/71)  BP(mean): 104 (02 Oct 2017 09:45) (81 - 104)  RR: 18 (02 Oct 2017 20:00) (16 - 36)  SpO2: 91% (02 Oct 2017 20:00) (85% - 100%)  Daily     Daily Weight in k.5 (02 Oct 2017 08:19)    10-01 @ 07:01  -  10-02 @ 07:00  --------------------------------------------------------  IN: 760.5 mL / OUT: 1025 mL / NET: -264.5 mL    10-02 @ :  -  10-02 @ 22:02  --------------------------------------------------------  IN: 700 mL / OUT: 400 mL / NET: 300 mL      Physical Exam:  Vital Signs : BP        HR       RR    Constitutional: well developed, well nourished, no deformities and no acute distress    Neurological: Alert & Oriented x 3, CARRILLO, no focal deficits    HEENT: NC/AT, PERRLA, EOMI,  Neck supple.    Respiratory: decreased breath sounds left base    Cardiovascular: (+) S1 & S2,     Gastrointestinal: soft, NT, nondistended, (+) BS    Genitourinary: non distended bladder, voiding freely    Extremities: No pedal edema, No clubbing, No cyanosis    Skin:  normal skin color and pigmentation, no skin lesions            LABS:                        8.0    19.8  )-----------( 196      ( 02 Oct 2017 01:34 )             23.8     10    137  |  101  |  13  ----------------------------<  245<H>  4.2   |  25  |  0.61    Ca    7.7<L>      02 Oct 2017 01:34  Phos  1.5     10-    TPro  5.9<L>  /  Alb  3.4  /  TBili  0.7  /  DBili  x   /  AST  37  /  ALT  27  /  AlkPhos  43  10          RADIOLOGY & ADDITIONAL TESTS:    TELE:    EKG:

## 2017-10-02 NOTE — PROGRESS NOTE ADULT - ATTENDING COMMENTS
Susana Coulter MD  [facility]   [phone]/ [phone]
Susana Coulter MD  [facility]   [phone]/ [phone]

## 2017-10-02 NOTE — PROGRESS NOTE ADULT - ASSESSMENT
Assessment  DMT2: 52y Male with DM T2 with hyperglycemia on insulin, s/p Sx,  blood sugar high, no hypoglycemia, eating meals.  HTN: Controlled, On med.  HLD:  On meds.  CAD: S/P Sx, Stable, on meds, monitored.

## 2017-10-02 NOTE — PROGRESS NOTE ADULT - SUBJECTIVE AND OBJECTIVE BOX
im ok , some pain    VITAL SIGNS    Telemetry:  NSR 70-90     Vital Signs Last 24 Hrs  T(C): 37.3 (10-02-17 @ 14:00), Max: 37.3 (10-02-17 @ 10:30)  T(F): 99.1 (10-02-17 @ 14:00), Max: 99.1 (10-02-17 @ 10:30)  HR: 103 (10-02-17 @ 14:00) (80 - 112)  BP: 103/69 (10-02-17 @ 14:00) (103/69 - 149/71)  RR: 20 (10-02-17 @ 14:00) (16 - 36)  SpO2: 95% (10-02-17 @ 14:01) (85% - 100%)                   10-01 @ 07:01  -  10-02 @ 07:00  --------------------------------------------------------  IN: 760.5 mL / OUT: 1025 mL / NET: -264.5 mL    10-02 @ 07:01  -  10-02 @ 18:33  --------------------------------------------------------  IN: 440 mL / OUT: 400 mL / NET: 40 mL          Daily     Daily Weight in k.5 (02 Oct 2017 08:19)        CAPILLARY BLOOD GLUCOSE  211 (02 Oct 2017 16:01)  259 (02 Oct 2017 12:15)  218 (02 Oct 2017 08:00)  242 (02 Oct 2017 02:00)  282 (01 Oct 2017 22:00)                      Coumadin    [ ] YES          [  ]      NO         Reason:                               PHYSICAL EXAM        Neurology: alert and oriented x 3, nonfocal, no gross deficits  CV : S1 S2 , RRR   Sternal Wound :  CDI , Stable  Pacing Wires        [ x ]   Settings:        vvi                          Isolated  [  ]  Lungs: bibasilar crackles  Abdomen: soft, nontender, nondistended, positive bowel sounds, last bowel movement   :           voiding  Extremities:     - edema, echymosis bilat thighs, incisions cdi, ace wraps          docusate sodium 100 milliGRAM(s) Oral three times a day  aspirin enteric coated 81 milliGRAM(s) Oral daily  atorvastatin 80 milliGRAM(s) Oral at bedtime  clopidogrel Tablet 75 milliGRAM(s) Oral daily  dextrose 5%. 1000 milliLiter(s) IV Continuous <Continuous>  enoxaparin Injectable 30 milliGRAM(s) SubCutaneous every 12 hours  pantoprazole    Tablet 40 milliGRAM(s) Oral before breakfast  dextrose Gel 1 Dose(s) Oral once PRN  dextrose 50% Injectable 12.5 Gram(s) IV Push once  dextrose 50% Injectable 25 Gram(s) IV Push once  glucagon  Injectable 1 milliGRAM(s) IntraMuscular once PRN  oxyCODONE    5 mG/acetaminophen 325 mG 1 Tablet(s) Oral every 4 hours PRN  oxyCODONE    5 mG/acetaminophen 325 mG 2 Tablet(s) Oral every 4 hours PRN  metoprolol 25 milliGRAM(s) Oral every 8 hours  ferrous    sulfate 325 milliGRAM(s) Oral two times a day with meals  insulin lispro (HumaLOG) corrective regimen sliding scale   SubCutaneous three times a day before meals  insulin lispro (HumaLOG) corrective regimen sliding scale   SubCutaneous at bedtime  folic acid 1 milliGRAM(s) Oral daily  ascorbic acid 500 milliGRAM(s) Oral daily  senna 2 Tablet(s) Oral at bedtime  sodium chloride 0.9% lock flush 3 milliLiter(s) IV Push every 8 hours  insulin lispro Injectable (HumaLOG) 16 Unit(s) SubCutaneous three times a day with meals  insulin glargine Injectable (LANTUS) 56 Unit(s) SubCutaneous at bedtime                    Physical Therapy Rec:   Home  [  ]   Home w/ PT  [ x ]  Rehab  [  ]  Discussed with Cardiothoracic Team at AM rounds.

## 2017-10-02 NOTE — PROGRESS NOTE ADULT - PROBLEM SELECTOR PLAN 1
Will increase Lantus to 56 units at bed time.  Will increase Humalog to 16 units before each meal in addition to Humalog correction scale coverage.  Patient counseled for compliance with consistent low carb diet.

## 2017-10-02 NOTE — PROGRESS NOTE ADULT - SUBJECTIVE AND OBJECTIVE BOX
Chief complaint  Patient is a 52y old  Male who presents with a chief complaint of Chest pain (27 Sep 2017 17:58)   Review of systems  Patient in bed, comfortable, no fever, no hypoglycemia.    Labs and Fingersticks    CAPILLARY BLOOD GLUCOSE  211 (02 Oct 2017 16:01)  259 (02 Oct 2017 12:15)  218 (02 Oct 2017 08:00)  242 (02 Oct 2017 02:00)  282 (01 Oct 2017 22:00)  188 (01 Oct 2017 14:00)  209 (01 Oct 2017 13:00)  149 (01 Oct 2017 12:00)  132 (01 Oct 2017 11:30)  87 (01 Oct 2017 11:00)  165 (01 Oct 2017 10:00)  118 (01 Oct 2017 09:00)  111 (01 Oct 2017 08:00)  116 (01 Oct 2017 07:00)  124 (01 Oct 2017 06:00)  137 (01 Oct 2017 05:00)  140 (01 Oct 2017 04:00)  164 (01 Oct 2017 03:00)  166 (01 Oct 2017 02:00)  169 (01 Oct 2017 01:00)  182 (01 Oct 2017 00:00)  203 (30 Sep 2017 23:00)  196 (30 Sep 2017 22:00)  204 (30 Sep 2017 21:00)  209 (30 Sep 2017 20:00)  230 (30 Sep 2017 19:00)  245 (30 Sep 2017 18:00)  243 (30 Sep 2017 17:00)  137 (30 Sep 2017 12:00)  124 (30 Sep 2017 10:00)  100 (30 Sep 2017 09:00)  103 (30 Sep 2017 08:00)  111 (30 Sep 2017 07:00)  112 (30 Sep 2017 06:00)  136 (30 Sep 2017 05:00)  156 (30 Sep 2017 04:00)  98 (30 Sep 2017 03:00)  113 (30 Sep 2017 02:00)  126 (30 Sep 2017 01:00)  164 (30 Sep 2017 00:00)  149 (29 Sep 2017 23:00)  136 (29 Sep 2017 22:00)  150 (29 Sep 2017 21:00)  163 (29 Sep 2017 20:00)  164 (29 Sep 2017 19:00)  172 (29 Sep 2017 18:00)  202 (29 Sep 2017 17:00)    Anion Gap, Serum: 11 (10-02 @ 01:34)  Anion Gap, Serum: 11 (10-01 @ 02:54)      Calcium, Total Serum: 7.7 <L> (10-02 @ 01:34)  Calcium, Total Serum: 7.8 <L> (10-01 @ 02:54)  Albumin, Serum: 3.4 (10-02 @ 01:34)  Albumin, Serum: 3.4 (10-01 @ 02:54)    Alanine Aminotransferase (ALT/SGPT): 27 (10-02 @ 01:34)  Alanine Aminotransferase (ALT/SGPT): 27 (10-01 @ 02:54)  Alkaline Phosphatase, Serum: 43 (10-02 @ 01:34)  Alkaline Phosphatase, Serum: 34 <L> (10-01 @ 02:54)  Aspartate Aminotransferase (AST/SGOT): 37 (10-02 @ 01:34)  Aspartate Aminotransferase (AST/SGOT): 48 <H> (10-01 @ 02:54)        10-02    137  |  101  |  13  ----------------------------<  245<H>  4.2   |  25  |  0.61    Ca    7.7<L>      02 Oct 2017 01:34  Phos  1.5     10-02    TPro  5.9<L>  /  Alb  3.4  /  TBili  0.7  /  DBili  x   /  AST  37  /  ALT  27  /  AlkPhos  43  10-02                        8.0    19.8  )-----------( 196      ( 02 Oct 2017 01:34 )             23.8     Medications  MEDICATIONS  (STANDING):  docusate sodium 100 milliGRAM(s) Oral three times a day  aspirin enteric coated 81 milliGRAM(s) Oral daily  atorvastatin 80 milliGRAM(s) Oral at bedtime  clopidogrel Tablet 75 milliGRAM(s) Oral daily  dextrose 5%. 1000 milliLiter(s) (50 mL/Hr) IV Continuous <Continuous>  enoxaparin Injectable 30 milliGRAM(s) SubCutaneous every 12 hours  pantoprazole    Tablet 40 milliGRAM(s) Oral before breakfast  dextrose 50% Injectable 12.5 Gram(s) IV Push once  dextrose 50% Injectable 25 Gram(s) IV Push once  metoprolol 25 milliGRAM(s) Oral every 8 hours  ferrous    sulfate 325 milliGRAM(s) Oral two times a day with meals  insulin lispro (HumaLOG) corrective regimen sliding scale   SubCutaneous three times a day before meals  insulin lispro (HumaLOG) corrective regimen sliding scale   SubCutaneous at bedtime  folic acid 1 milliGRAM(s) Oral daily  ascorbic acid 500 milliGRAM(s) Oral daily  senna 2 Tablet(s) Oral at bedtime  sodium chloride 0.9% lock flush 3 milliLiter(s) IV Push every 8 hours  insulin lispro Injectable (HumaLOG) 16 Unit(s) SubCutaneous three times a day with meals  insulin glargine Injectable (LANTUS) 56 Unit(s) SubCutaneous at bedtime      Physical Exam  General: Patient comfortable in bed  Vital Signs Last 12 Hrs  T(F): 99.1 (10-02-17 @ 14:00), Max: 99.1 (10-02-17 @ 10:30)  HR: 103 (10-02-17 @ 14:00) (80 - 103)  BP: 103/69 (10-02-17 @ 14:00) (103/69 - 149/71)  BP(mean): 104 (10-02-17 @ 09:45) (84 - 104)  RR: 20 (10-02-17 @ 14:00) (16 - 23)  SpO2: 95% (10-02-17 @ 14:01) (85% - 100%)  Neck: No palpable thyroid nodules.  CVS: S1S2, No murmurs  Respiratory: No wheezing, no crepitations  GI: Abdomen soft, bowel sounds positive  Musculoskeletal: Positive edema lower extremities bilaterally  Skin: No skin rashes, no ecchymosis    Diagnostics

## 2017-10-02 NOTE — PROGRESS NOTE ADULT - ASSESSMENT
This is a  51 y/o  male PMH CAD, PCI ,HTN,DM2 HLD, belles palsy, found to have multi vessel disease s/p  cardiac cath 8/18/17  preoperative lab workup revealed leukocytosis- UA negative  blood cx drawn and Urine culture sent. Patient directly admitted to [address] [place] [address] placed on heparin gtt. Last dose plavix was on  9/24.  Endocrine consulted ID consulted., scheduled for CABG  9/29 09/29/2017  CABG X 5 (LIMA to LAD, SVG to PDA, SVG to Ramus, SVG to OM, SVG to Diag)  Post op elevated glucose  On inotropes- weaned off  Beta blocker started. Endo f/u for glucose control  Transferred to [facility]

## 2017-10-02 NOTE — DIETITIAN INITIAL EVALUATION ADULT. - SIGNS/SYMPTOMS
Food recall includes items not within recommended therapeutic diet guidelines s/p CABG via sternotomy

## 2017-10-02 NOTE — DIETITIAN INITIAL EVALUATION ADULT. - ETIOLOGY
Food and nutrition related knowledge deficit Increased demand for nutrients to promote postoperative healing

## 2017-10-02 NOTE — DIETITIAN INITIAL EVALUATION ADULT. - NS AS NUTRI INTERV ED CONTENT
Priority modifications/Recommended modifications/Purpose of the nutrition education/Nutrition relationship to health/disease/Provided verbal and written instruction on consistent CHO and heart healthy diet guidelines. Encourage pt to avoid fried foods, soda/concentrated sweets, processed food/fast food, and refined CHO. Encourage increased physical activity and weight loss as medically feasible. Encouraged pt to follow up with outpatient RD, provided pt with contact information for diabetes wellness program.

## 2017-10-02 NOTE — PROGRESS NOTE ADULT - SUBJECTIVE AND OBJECTIVE BOX
Operation; C5L    POD # 3    Patient is doing well, resting comfortably; complains of mild incisional pain that is relieved by pain medication.    Vital Signs Last 24 Hrs  T(C): 37.2 (02 Oct 2017 03:00), Max: 37.2 (01 Oct 2017 19:00)  T(F): 99 (02 Oct 2017 03:00), Max: 99 (01 Oct 2017 19:00)  HR: 90 (02 Oct 2017 04:00) (84 - 112)  BP: 121/58 (02 Oct 2017 04:00) (106/56 - 149/69)  BP(mean): 83 (02 Oct 2017 04:00) (76 - 108)  RR: 28 (02 Oct 2017 04:00) (13 - 36)  SpO2: 97% (02 Oct 2017 04:00) (95% - 100%)  I&O's Detail    30 Sep 2017 07:01  -  01 Oct 2017 07:00  --------------------------------------------------------  IN:    DOBUTamine Infusion: 10.5 mL    insulin Infusion: 2.5 mL    insulin Infusion: 79 mL    IV PiggyBack: 262.5 mL    Oral Fluid: 330 mL    sodium chloride 0.9%: 240 mL    vasopressin Infusion: 15 mL  Total IN: 939.5 mL    OUT:    Chest Tube: 230 mL    Indwelling Catheter - Urethral: 1060 mL  Total OUT: 1290 mL    Total NET: -350.5 mL      01 Oct 2017 07:01  -  02 Oct 2017 05:13  --------------------------------------------------------  IN:    insulin Infusion: 23 mL    IV PiggyBack: 187.5 mL    Oral Fluid: 480 mL    sodium chloride 0.9%: 70 mL  Total IN: 760.5 mL    OUT:    Chest Tube: 150 mL    Voided: 850 mL  Total OUT: 1000 mL    Total NET: -239.5 mL        EPICARDIAL WIRES: Ventricular wires   MEJIA: No.    MEDICATIONS  (STANDING):  docusate sodium 100 milliGRAM(s) Oral three times a day  aspirin enteric coated 81 milliGRAM(s) Oral daily  atorvastatin 80 milliGRAM(s) Oral at bedtime  clopidogrel Tablet 75 milliGRAM(s) Oral daily  dextrose 5%. 1000 milliLiter(s) (50 mL/Hr) IV Continuous <Continuous>  enoxaparin Injectable 30 milliGRAM(s) SubCutaneous every 12 hours  pantoprazole    Tablet 40 milliGRAM(s) Oral before breakfast  insulin glargine Injectable (LANTUS) 30 Unit(s) SubCutaneous at bedtime  dextrose 50% Injectable 12.5 Gram(s) IV Push once  dextrose 50% Injectable 25 Gram(s) IV Push once  insulin lispro (HumaLOG) corrective regimen sliding scale   SubCutaneous three times a day before meals  insulin lispro (HumaLOG) corrective regimen sliding scale   SubCutaneous at bedtime  metoprolol 25 milliGRAM(s) Oral every 12 hours  insulin lispro Injectable (HumaLOG) 10 Unit(s) SubCutaneous three times a day with meals  metoclopramide Injectable 10 milliGRAM(s) IV Push every 8 hours  sodium phosphate IVPB 15 milliMole(s) IV Intermittent once  metFORMIN ER 1000 milliGRAM(s) Oral two times a day with meals    MEDICATIONS  (PRN):  dextrose Gel 1 Dose(s) Oral once PRN Blood Glucose LESS THAN 70 milliGRAM(s)/deciliter  glucagon  Injectable 1 milliGRAM(s) IntraMuscular once PRN Glucose LESS THAN 70 milligrams/deciliter  oxyCODONE    5 mG/acetaminophen 325 mG 1 Tablet(s) Oral every 4 hours PRN Mild Pain (1 - 3)  oxyCODONE    5 mG/acetaminophen 325 mG 2 Tablet(s) Oral every 4 hours PRN Severe Pain (7 - 10)      General: A+Ox3, in NAD  Chest: sternal dressing C/D/I  Heart: S1, S2, RRR  Lungs: CTA B/L, dimished at thwe bases  Abdomen: Soft, ND, NT, positive BS  Extremeties: without edema B/L LE, positive DP pulses B/L     Does the patient have a history of CHF?  -If yes, systolic or diastolic  -pre-operative EF 50%    Extubation within 24 hours? yes    CXR reviewed with attending.     Does the patient have a history of kidney disease? no  -give CKD stage if applicable  -what is patient's baseline Creatinine 0.6    Beta Blockers contraindicated for the first 24 hours due to vasopressor/inotropic medication      Did the patient receive transfusion of blood and/or products? anemia due to acute blood loss  -If yes, indication    DVT PPX with vendodynes as well as chemical prophylaxis.    Shena-operative antibiotics to be discontinued within 48 hours of CTU admission    Patient care discussed on morning interdisciplinary rounds with CTS team.

## 2017-10-02 NOTE — PROGRESS NOTE ADULT - ASSESSMENT
52 year male with DM HTN hyperlipidemia, with multivessel CAD, progressive angina, post CABG.  Noted with dizziness upon standing, mild hypotension and LLL atelectasis verses pneumonia, hypoxia.

## 2017-10-02 NOTE — DIETITIAN INITIAL EVALUATION ADULT. - ADHERENCE
poor/diabetic, heart healthy diet. Pt admits to eating fried foods, concentrated sweets, whole milk, sugary cereals, 2-2.5 liters or soda/day, and reports high CHO intake.

## 2017-10-02 NOTE — DIETITIAN INITIAL EVALUATION ADULT. - ENERGY NEEDS
Ht:5ft7in, Wt:248pounds, BMI:38.9,   IBW:148pounds (+/-10%), 168%IBW  Pt with 1+generalized edema, no pressure ulcers noted.   Pertinent Information: Pt s/p CABG x5 vessels (POD #3) as per chart.

## 2017-10-03 DIAGNOSIS — R42 DIZZINESS AND GIDDINESS: ICD-10-CM

## 2017-10-03 LAB
ANION GAP SERPL CALC-SCNC: 11 MMOL/L — SIGNIFICANT CHANGE UP (ref 5–17)
BLD GP AB SCN SERPL QL: NEGATIVE — SIGNIFICANT CHANGE UP
BUN SERPL-MCNC: 14 MG/DL — SIGNIFICANT CHANGE UP (ref 7–23)
CALCIUM SERPL-MCNC: 7.9 MG/DL — LOW (ref 8.4–10.5)
CHLORIDE SERPL-SCNC: 101 MMOL/L — SIGNIFICANT CHANGE UP (ref 96–108)
CO2 SERPL-SCNC: 27 MMOL/L — SIGNIFICANT CHANGE UP (ref 22–31)
CREAT SERPL-MCNC: 0.65 MG/DL — SIGNIFICANT CHANGE UP (ref 0.5–1.3)
GLUCOSE SERPL-MCNC: 128 MG/DL — HIGH (ref 70–99)
HCT VFR BLD CALC: 24 % — LOW (ref 39–50)
HGB BLD-MCNC: 7.8 G/DL — LOW (ref 13–17)
MCHC RBC-ENTMCNC: 29.5 PG — SIGNIFICANT CHANGE UP (ref 27–34)
MCHC RBC-ENTMCNC: 32.5 GM/DL — SIGNIFICANT CHANGE UP (ref 32–36)
MCV RBC AUTO: 90.9 FL — SIGNIFICANT CHANGE UP (ref 80–100)
NRBC # BLD: 1 /100 WBCS — HIGH (ref 0–0)
PLATELET # BLD AUTO: 305 K/UL — SIGNIFICANT CHANGE UP (ref 150–400)
POTASSIUM SERPL-MCNC: 4.1 MMOL/L — SIGNIFICANT CHANGE UP (ref 3.5–5.3)
POTASSIUM SERPL-SCNC: 4.1 MMOL/L — SIGNIFICANT CHANGE UP (ref 3.5–5.3)
RBC # BLD: 2.64 M/UL — LOW (ref 4.2–5.8)
RBC # FLD: 14.7 % — HIGH (ref 10.3–14.5)
RH IG SCN BLD-IMP: POSITIVE — SIGNIFICANT CHANGE UP
SODIUM SERPL-SCNC: 139 MMOL/L — SIGNIFICANT CHANGE UP (ref 135–145)
WBC # BLD: 16.86 K/UL — HIGH (ref 3.8–10.5)
WBC # FLD AUTO: 16.86 K/UL — HIGH (ref 3.8–10.5)

## 2017-10-03 PROCEDURE — 93306 TTE W/DOPPLER COMPLETE: CPT | Mod: 26

## 2017-10-03 PROCEDURE — 71010: CPT | Mod: 26

## 2017-10-03 RX ORDER — FUROSEMIDE 40 MG
40 TABLET ORAL ONCE
Qty: 0 | Refills: 0 | Status: COMPLETED | OUTPATIENT
Start: 2017-10-03 | End: 2017-10-03

## 2017-10-03 RX ADMIN — Medication 4: at 17:21

## 2017-10-03 RX ADMIN — SODIUM CHLORIDE 3 MILLILITER(S): 9 INJECTION INTRAMUSCULAR; INTRAVENOUS; SUBCUTANEOUS at 14:00

## 2017-10-03 RX ADMIN — Medication 100 MILLIGRAM(S): at 17:15

## 2017-10-03 RX ADMIN — Medication 325 MILLIGRAM(S): at 17:26

## 2017-10-03 RX ADMIN — Medication 100 MILLIGRAM(S): at 05:53

## 2017-10-03 RX ADMIN — ENOXAPARIN SODIUM 30 MILLIGRAM(S): 100 INJECTION SUBCUTANEOUS at 09:23

## 2017-10-03 RX ADMIN — OXYCODONE AND ACETAMINOPHEN 2 TABLET(S): 5; 325 TABLET ORAL at 18:30

## 2017-10-03 RX ADMIN — Medication 81 MILLIGRAM(S): at 12:32

## 2017-10-03 RX ADMIN — Medication 25 MILLIGRAM(S): at 05:53

## 2017-10-03 RX ADMIN — ATORVASTATIN CALCIUM 80 MILLIGRAM(S): 80 TABLET, FILM COATED ORAL at 22:00

## 2017-10-03 RX ADMIN — Medication 40 MILLIGRAM(S): at 17:26

## 2017-10-03 RX ADMIN — Medication 16 UNIT(S): at 17:17

## 2017-10-03 RX ADMIN — CLOPIDOGREL BISULFATE 75 MILLIGRAM(S): 75 TABLET, FILM COATED ORAL at 12:32

## 2017-10-03 RX ADMIN — SODIUM CHLORIDE 3 MILLILITER(S): 9 INJECTION INTRAMUSCULAR; INTRAVENOUS; SUBCUTANEOUS at 05:52

## 2017-10-03 RX ADMIN — SODIUM CHLORIDE 3 MILLILITER(S): 9 INJECTION INTRAMUSCULAR; INTRAVENOUS; SUBCUTANEOUS at 22:00

## 2017-10-03 RX ADMIN — Medication 500 MILLIGRAM(S): at 12:32

## 2017-10-03 RX ADMIN — Medication 100 MILLIGRAM(S): at 22:00

## 2017-10-03 RX ADMIN — Medication 25 MILLIGRAM(S): at 22:00

## 2017-10-03 RX ADMIN — Medication 1 MILLIGRAM(S): at 12:32

## 2017-10-03 RX ADMIN — OXYCODONE AND ACETAMINOPHEN 2 TABLET(S): 5; 325 TABLET ORAL at 17:37

## 2017-10-03 RX ADMIN — Medication 16 UNIT(S): at 08:21

## 2017-10-03 RX ADMIN — ENOXAPARIN SODIUM 30 MILLIGRAM(S): 100 INJECTION SUBCUTANEOUS at 22:01

## 2017-10-03 RX ADMIN — SENNA PLUS 2 TABLET(S): 8.6 TABLET ORAL at 22:00

## 2017-10-03 RX ADMIN — Medication 325 MILLIGRAM(S): at 08:21

## 2017-10-03 RX ADMIN — Medication 25 MILLIGRAM(S): at 12:32

## 2017-10-03 RX ADMIN — INSULIN GLARGINE 56 UNIT(S): 100 INJECTION, SOLUTION SUBCUTANEOUS at 22:13

## 2017-10-03 RX ADMIN — Medication 16 UNIT(S): at 12:32

## 2017-10-03 RX ADMIN — OXYCODONE AND ACETAMINOPHEN 2 TABLET(S): 5; 325 TABLET ORAL at 00:41

## 2017-10-03 RX ADMIN — PANTOPRAZOLE SODIUM 40 MILLIGRAM(S): 20 TABLET, DELAYED RELEASE ORAL at 05:53

## 2017-10-03 NOTE — PROGRESS NOTE ADULT - PROBLEM SELECTOR PLAN 1
Asa, Statin, B-blocker.  Optimize B-blocker as tolerated.   Ambulate 4x daily as tolerated and with PT.   Cough and deep breathe, Incentive Spirometry Q1h, Chest PT. Asa, Statin, B-blocker.  Optimize B-blocker as tolerated.   Ambulate 4x daily as tolerated and with PT.   Cough and deep breathe, Incentive Spirometry Q1h, Chest PT.  DC PW.  Discharge planning: Home with Home PT.

## 2017-10-03 NOTE — CONSULT NOTE ADULT - ASSESSMENT
52M with PMh of CAD,HTN,DM2 HLD, found to have multi vessel disease s/p  cardiac cath 8/18/17 , sp CABG  9/29 now p/w dizziness, suspect orthostatic hypotension.      -Prior to surgery, pt likely had peripheral vestibulopathy, currently resolved  -Agree with checking orthostatics   -pt also with downtrending H/H, planning for transfusion  -paresthesias may be 2/2 increased edema vs underlying peripheral neuropathy most likely 2/2 DM. Observe if improvement with diuresis  -post op care  -D/W pt

## 2017-10-03 NOTE — CONSULT NOTE ADULT - SUBJECTIVE AND OBJECTIVE BOX
Admitting Diagnosis:  Atherosclerosis of native coronary artery without angina pectoris (I25.10): ATHSCL HEART DISEASE OF NATIVE CORONARY ARTERY W/O ANG PCTRS  Atherosclerosis of native coronary artery without angina pectoris (I25.10)      HPI:  52M with PMh of CAD,HTN,DM2 HLD, found to have multi vessel disease s/p  cardiac cath 8/18/17 , sp CABG  9/29 now p/w dizziness.  Pt states that prior to the CABG he had dizziness worse with turning head in either L or R direction and associated with tinnitis, no ear pain or discharge.  However, since his procedure that has subsided.  Now he c/o lightheaded sensation worse when getting up.  He also c/o numbness and tingling in b/l toes, since his procedure.  He attributes this is increase peripheral edema.  Denies any other focal neurologic deficit.        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 corrective lenses  Obesity (278.00): morbid, BMI 40  MI (myocardial infarction) (410.90): 2012  CAD (coronary artery disease) (414.00): severe  DM (diabetes mellitus) (250.00): Type II, Hg A1C 12.2 ( 8/18/17) , does not check sugar at home regularly  HLD (hyperlipidemia) (272.4)  HTN (hypertension) (401.9)      Past Surgical History:  History of coronary artery stent placement (V45.82): x3 (2012)  Fracture of arm (818.0): 1987, left radial fracture/operation/hardware      Social History:  No toxic habits    Family History:  FAMILY HISTORY:  Family history of heart disease (Grandparent, Father)  Family history of diabetes mellitus (Grandparent, Father)      Allergies:  No Known Allergies      ROS:  Constitutional: Patient offers no complaints of fevers or significant weight loss  Ears, Nose, Mouth and Throat: The patient presents with no abnormalities of the head, ears, eyes, nose or throat  Skin: Patient offers no concerns of new rashes or lesions  Respiratory: The patient presents with no abnormalities of the respiratory tract  Cardiovascular: The patient presents with no cardiac abnormalities  Gastrointestinal: The patient presents with no abnormalities of the GI system  Genitourinary: The patient presents with no dysuria, hematuria or frequent urination  Neurological: See HPI  Endocrine: Patient offers no complaints of excessive thirst, urination, or heat/cold intolerance    Advanced care planning reviewed and noted in the chart.    Medications:  ascorbic acid 500 milliGRAM(s) Oral daily  aspirin enteric coated 81 milliGRAM(s) Oral daily  atorvastatin 80 milliGRAM(s) Oral at bedtime  clopidogrel Tablet 75 milliGRAM(s) Oral daily  dextrose 5%. 1000 milliLiter(s) IV Continuous <Continuous>  dextrose 50% Injectable 12.5 Gram(s) IV Push once  dextrose 50% Injectable 25 Gram(s) IV Push once  dextrose Gel 1 Dose(s) Oral once PRN  docusate sodium 100 milliGRAM(s) Oral three times a day  enoxaparin Injectable 30 milliGRAM(s) SubCutaneous every 12 hours  ferrous    sulfate 325 milliGRAM(s) Oral two times a day with meals  folic acid 1 milliGRAM(s) Oral daily  glucagon  Injectable 1 milliGRAM(s) IntraMuscular once PRN  insulin glargine Injectable (LANTUS) 56 Unit(s) SubCutaneous at bedtime  insulin lispro (HumaLOG) corrective regimen sliding scale   SubCutaneous three times a day before meals  insulin lispro (HumaLOG) corrective regimen sliding scale   SubCutaneous at bedtime  insulin lispro Injectable (HumaLOG) 16 Unit(s) SubCutaneous three times a day with meals  metoprolol 25 milliGRAM(s) Oral every 8 hours  oxyCODONE    5 mG/acetaminophen 325 mG 1 Tablet(s) Oral every 4 hours PRN  oxyCODONE    5 mG/acetaminophen 325 mG 2 Tablet(s) Oral every 4 hours PRN  pantoprazole    Tablet 40 milliGRAM(s) Oral before breakfast  senna 2 Tablet(s) Oral at bedtime  sodium chloride 0.9% lock flush 3 milliLiter(s) IV Push every 8 hours      Labs:  CBC Full  -  ( 02 Oct 2017 01:34 )  WBC Count : 19.8 K/uL  Hemoglobin : 8.0 g/dL  Hematocrit : 23.8 %  Platelet Count - Automated : 196 K/uL  Mean Cell Volume : 92.9 fl  Mean Cell Hemoglobin : 31.3 pg  Mean Cell Hemoglobin Concentration : 33.7 gm/dL  Auto Neutrophil # : x  Auto Lymphocyte # : x  Auto Monocyte # : x  Auto Eosinophil # : x  Auto Basophil # : x  Auto Neutrophil % : x  Auto Lymphocyte % : x  Auto Monocyte % : x  Auto Eosinophil % : x  Auto Basophil % : x    10-03    139  |  101  |  14  ----------------------------<  128<H>  4.1   |  27  |  0.65    Ca    7.9<L>      03 Oct 2017 05:33  Phos  1.5     10-02    TPro  5.9<L>  /  Alb  3.4  /  TBili  0.7  /  DBili  x   /  AST  37  /  ALT  27  /  AlkPhos  43  10-02    CAPILLARY BLOOD GLUCOSE  125 (03 Oct 2017 07:55)  145 (02 Oct 2017 21:43)  211 (02 Oct 2017 16:01)  259 (02 Oct 2017 12:15)        LIVER FUNCTIONS - ( 02 Oct 2017 01:34 )  Alb: 3.4 g/dL / Pro: 5.9 g/dL / ALK PHOS: 43 U/L / ALT: 27 U/L RC / AST: 37 U/L / GGT: x                 Vitals:  Vital Signs Last 24 Hrs  T(C): 36.7 (03 Oct 2017 05:00), Max: 37.3 (02 Oct 2017 14:00)  T(F): 98 (03 Oct 2017 05:00), Max: 99.1 (02 Oct 2017 14:00)  HR: 79 (03 Oct 2017 05:00) (79 - 103)  BP: 127/89 (03 Oct 2017 05:00) (94/64 - 127/89)  BP(mean): --  RR: 18 (03 Oct 2017 05:00) (18 - 20)  SpO2: 99% (03 Oct 2017 05:00) (85% - 99%)    NEUROLOGICAL EXAM:    Mental status: Awake, alert, and in no apparent distress. Oriented to person, place and time. Language function is normal. Recent memory, digit span and concentration were normal. Follows requests.    Cranial Nerves: Pupils were equal, round, reactive to light. Extraocular movements were intact. Visual field were full. Fundoscopic exam was deferred. Facial sensation was intact to light touch. There was no facial asymmetry. The palate was upgoing symmetrically and tongue was midline. Hearing acuity was intact to finger rub AU. Shoulder shrug was full bilaterally    Motor exam: Bulk and tone were normal. Strength was 5/5 in all four extremities. Fine finger movements were symmetric and normal. There was no pronator drift    Reflexes: 1+ in the bilateral upper extremities. 1+ in the bilateral lower extremities. Toes were downgoing bilaterally.     Sensation: Intact to light touch throughout    Coordination: Finger-nose-finger  without dysmetria.     Gait:Deferred

## 2017-10-03 NOTE — PROGRESS NOTE ADULT - ASSESSMENT
This is a  51 y/o  male PMH CAD, PCI ,HTN,DM2 HLD, belles palsy, found to have multi vessel disease s/p  cardiac cath 8/18/17  preoperative lab workup revealed leukocytosis- UA negative  blood cx drawn and Urine culture sent. Patient directly admitted to  [facility] placed on heparin gtt. Last dose plavix was on  9/24.  Endocrine consulted ID consulted., scheduled for CABG  9/29 09/29/2017  CABG X 5 (LIMA to LAD, SVG to PDA, SVG to Ramus, SVG to OM, SVG to Diag)  Post op elevated glucose now controlled on insulin, endocrine following.  On inotropes- weaned off  Acute blood loss anemia 2/2 surgery requiring multiple PRBC transfusions.  Beta blocker started.  10/2 Transferred to  [facility]  10/3 c/o dizziness, check TTE r/o effusion: H/H transfuse 1U PRBC. This is a  51 y/o  male PMH CAD, PCI ,HTN,DM2 HLD, belles palsy, found to have multi vessel disease s/p  cardiac cath 8/18/17  preoperative lab workup revealed leukocytosis- UA negative  blood cx drawn and Urine culture sent. Patient directly admitted to  [facility] placed on heparin gtt. Last dose plavix was on  9/24.  Endocrine consulted ID consulted., scheduled for CABG  9/29 09/29/2017  CABG X 5 (LIMA to LAD, SVG to PDA, SVG to Ramus, SVG to OM, SVG to Diag)  Post op elevated glucose now controlled on insulin, endocrine following.  On inotropes- weaned off  Acute blood loss anemia 2/2 surgery requiring multiple PRBC transfusions.  Beta blocker started.  10/2 Transferred to  [facility]  10/3 c/o dizziness - pt states occurred pre-op, check TTE r/o effusion: H/H transfuse 1U PRBC.

## 2017-10-03 NOTE — PROGRESS NOTE ADULT - SUBJECTIVE AND OBJECTIVE BOX
Chief complaint  Patient is a 52y old  Male who presents with a chief complaint of Chest pain (27 Sep 2017 17:58)   Review of systems  Patient in bed, comfortable, no fever,  no hypoglycemia.    Labs and Fingersticks    CAPILLARY BLOOD GLUCOSE  210 (03 Oct 2017 17:18)  128 (03 Oct 2017 12:27)  125 (03 Oct 2017 07:55)  145 (02 Oct 2017 21:43)  211 (02 Oct 2017 16:01)  259 (02 Oct 2017 12:15)  218 (02 Oct 2017 08:00)  242 (02 Oct 2017 02:00)  282 (01 Oct 2017 22:00)  188 (01 Oct 2017 14:00)  209 (01 Oct 2017 13:00)  149 (01 Oct 2017 12:00)  132 (01 Oct 2017 11:30)  87 (01 Oct 2017 11:00)  165 (01 Oct 2017 10:00)  118 (01 Oct 2017 09:00)  111 (01 Oct 2017 08:00)  116 (01 Oct 2017 07:00)  124 (01 Oct 2017 06:00)  137 (01 Oct 2017 05:00)  140 (01 Oct 2017 04:00)  164 (01 Oct 2017 03:00)  166 (01 Oct 2017 02:00)  169 (01 Oct 2017 01:00)  182 (01 Oct 2017 00:00)  203 (30 Sep 2017 23:00)  196 (30 Sep 2017 22:00)    Anion Gap, Serum: 11 (10-03 @ 05:33)  Anion Gap, Serum: 11 (10-02 @ 01:34)      Calcium, Total Serum: 7.9 <L> (10-03 @ 05:33)  Calcium, Total Serum: 7.7 <L> (10-02 @ 01:34)  Albumin, Serum: 3.4 (10-02 @ 01:34)    Alanine Aminotransferase (ALT/SGPT): 27 (10-02 @ 01:34)  Alkaline Phosphatase, Serum: 43 (10-02 @ 01:34)  Aspartate Aminotransferase (AST/SGOT): 37 (10-02 @ 01:34)        10-03    139  |  101  |  14  ----------------------------<  128<H>  4.1   |  27  |  0.65    Ca    7.9<L>      03 Oct 2017 05:33  Phos  1.5     10-02    TPro  5.9<L>  /  Alb  3.4  /  TBili  0.7  /  DBili  x   /  AST  37  /  ALT  27  /  AlkPhos  43  10-02                        7.8    16.86 )-----------( 305      ( 03 Oct 2017 08:38 )             24.0     Medications  MEDICATIONS  (STANDING):  ascorbic acid 500 milliGRAM(s) Oral daily  aspirin enteric coated 81 milliGRAM(s) Oral daily  atorvastatin 80 milliGRAM(s) Oral at bedtime  clopidogrel Tablet 75 milliGRAM(s) Oral daily  dextrose 5%. 1000 milliLiter(s) (50 mL/Hr) IV Continuous <Continuous>  dextrose 50% Injectable 12.5 Gram(s) IV Push once  dextrose 50% Injectable 25 Gram(s) IV Push once  docusate sodium 100 milliGRAM(s) Oral three times a day  enoxaparin Injectable 30 milliGRAM(s) SubCutaneous every 12 hours  ferrous    sulfate 325 milliGRAM(s) Oral two times a day with meals  folic acid 1 milliGRAM(s) Oral daily  insulin glargine Injectable (LANTUS) 56 Unit(s) SubCutaneous at bedtime  insulin lispro (HumaLOG) corrective regimen sliding scale   SubCutaneous three times a day before meals  insulin lispro (HumaLOG) corrective regimen sliding scale   SubCutaneous at bedtime  insulin lispro Injectable (HumaLOG) 16 Unit(s) SubCutaneous three times a day with meals  metoprolol 25 milliGRAM(s) Oral every 8 hours  pantoprazole    Tablet 40 milliGRAM(s) Oral before breakfast  senna 2 Tablet(s) Oral at bedtime  sodium chloride 0.9% lock flush 3 milliLiter(s) IV Push every 8 hours      Physical Exam  General: Patient comfortable in bed  Vital Signs Last 12 Hrs  T(F): 98.7 (10-03-17 @ 20:25), Max: 99.1 (10-03-17 @ 14:45)  HR: 86 (10-03-17 @ 20:25) (86 - 91)  BP: 142/78 (10-03-17 @ 20:25) (136/73 - 147/87)  BP(mean): 99 (10-03-17 @ 20:25) (99 - 99)  RR: 20 (10-03-17 @ 20:25) (18 - 20)  SpO2: 93% (10-03-17 @ 20:25) (92% - 96%)  Neck: No palpable thyroid nodules.  CVS: S1S2, No murmurs  Respiratory: No wheezing, no crepitations  GI: Abdomen soft, bowel sounds positive  Musculoskeletal: Positive edema lower extremities bilaterally  Skin: No skin rashes, no ecchymosis    Diagnostics

## 2017-10-03 NOTE — PROGRESS NOTE ADULT - ASSESSMENT
Assessment  DMT2: 52y Male with DM T2 with hyperglycemia on insulin, s/p Sx,  blood sugar improved, no hypoglycemia, eating meals.  HTN: Controlled, On med.  HLD:  On meds.  CAD: S/P Sx, Stable, on meds, monitored.

## 2017-10-04 ENCOUNTER — TRANSCRIPTION ENCOUNTER (OUTPATIENT)
Age: 52
End: 2017-10-04

## 2017-10-04 ENCOUNTER — RECORD ABSTRACTING (OUTPATIENT)
Age: 52
End: 2017-10-04

## 2017-10-04 VITALS
DIASTOLIC BLOOD PRESSURE: 73 MMHG | SYSTOLIC BLOOD PRESSURE: 134 MMHG | OXYGEN SATURATION: 93 % | RESPIRATION RATE: 18 BRPM | HEART RATE: 83 BPM

## 2017-10-04 LAB
ANION GAP SERPL CALC-SCNC: 15 MMOL/L — SIGNIFICANT CHANGE UP (ref 5–17)
BUN SERPL-MCNC: 12 MG/DL — SIGNIFICANT CHANGE UP (ref 7–23)
CALCIUM SERPL-MCNC: 8.3 MG/DL — LOW (ref 8.4–10.5)
CHLORIDE SERPL-SCNC: 98 MMOL/L — SIGNIFICANT CHANGE UP (ref 96–108)
CO2 SERPL-SCNC: 26 MMOL/L — SIGNIFICANT CHANGE UP (ref 22–31)
CREAT SERPL-MCNC: 0.61 MG/DL — SIGNIFICANT CHANGE UP (ref 0.5–1.3)
GLUCOSE SERPL-MCNC: 134 MG/DL — HIGH (ref 70–99)
HCT VFR BLD CALC: 27.8 % — LOW (ref 39–50)
HGB BLD-MCNC: 9.5 G/DL — LOW (ref 13–17)
MCHC RBC-ENTMCNC: 31.6 PG — SIGNIFICANT CHANGE UP (ref 27–34)
MCHC RBC-ENTMCNC: 34.2 GM/DL — SIGNIFICANT CHANGE UP (ref 32–36)
MCV RBC AUTO: 92.2 FL — SIGNIFICANT CHANGE UP (ref 80–100)
PLATELET # BLD AUTO: 371 K/UL — SIGNIFICANT CHANGE UP (ref 150–400)
POTASSIUM SERPL-MCNC: 3.8 MMOL/L — SIGNIFICANT CHANGE UP (ref 3.5–5.3)
POTASSIUM SERPL-SCNC: 3.8 MMOL/L — SIGNIFICANT CHANGE UP (ref 3.5–5.3)
RBC # BLD: 3.02 M/UL — LOW (ref 4.2–5.8)
RBC # FLD: 14.6 % — HIGH (ref 10.3–14.5)
SODIUM SERPL-SCNC: 139 MMOL/L — SIGNIFICANT CHANGE UP (ref 135–145)
SURGICAL PATHOLOGY STUDY: SIGNIFICANT CHANGE UP
WBC # BLD: 19.4 K/UL — HIGH (ref 3.8–10.5)
WBC # FLD AUTO: 19.4 K/UL — HIGH (ref 3.8–10.5)

## 2017-10-04 PROCEDURE — 85730 THROMBOPLASTIN TIME PARTIAL: CPT

## 2017-10-04 PROCEDURE — P9045: CPT

## 2017-10-04 PROCEDURE — 83036 HEMOGLOBIN GLYCOSYLATED A1C: CPT

## 2017-10-04 PROCEDURE — 82947 ASSAY GLUCOSE BLOOD QUANT: CPT

## 2017-10-04 PROCEDURE — 84484 ASSAY OF TROPONIN QUANT: CPT

## 2017-10-04 PROCEDURE — 86901 BLOOD TYPING SEROLOGIC RH(D): CPT

## 2017-10-04 PROCEDURE — 85014 HEMATOCRIT: CPT

## 2017-10-04 PROCEDURE — 85384 FIBRINOGEN ACTIVITY: CPT

## 2017-10-04 PROCEDURE — 99239 HOSP IP/OBS DSCHRG MGMT >30: CPT

## 2017-10-04 PROCEDURE — 80048 BASIC METABOLIC PNL TOTAL CA: CPT

## 2017-10-04 PROCEDURE — 83735 ASSAY OF MAGNESIUM: CPT

## 2017-10-04 PROCEDURE — 82553 CREATINE MB FRACTION: CPT

## 2017-10-04 PROCEDURE — C1889: CPT

## 2017-10-04 PROCEDURE — 86923 COMPATIBILITY TEST ELECTRIC: CPT

## 2017-10-04 PROCEDURE — 88305 TISSUE EXAM BY PATHOLOGIST: CPT

## 2017-10-04 PROCEDURE — 86891 AUTOLOGOUS BLOOD OP SALVAGE: CPT

## 2017-10-04 PROCEDURE — 86900 BLOOD TYPING SEROLOGIC ABO: CPT

## 2017-10-04 PROCEDURE — 83605 ASSAY OF LACTIC ACID: CPT

## 2017-10-04 PROCEDURE — 97116 GAIT TRAINING THERAPY: CPT

## 2017-10-04 PROCEDURE — 81003 URINALYSIS AUTO W/O SCOPE: CPT

## 2017-10-04 PROCEDURE — 82803 BLOOD GASES ANY COMBINATION: CPT

## 2017-10-04 PROCEDURE — 86850 RBC ANTIBODY SCREEN: CPT

## 2017-10-04 PROCEDURE — P9016: CPT

## 2017-10-04 PROCEDURE — 84443 ASSAY THYROID STIM HORMONE: CPT

## 2017-10-04 PROCEDURE — C1769: CPT

## 2017-10-04 PROCEDURE — 93005 ELECTROCARDIOGRAM TRACING: CPT

## 2017-10-04 PROCEDURE — 71045 X-RAY EXAM CHEST 1 VIEW: CPT

## 2017-10-04 PROCEDURE — 82435 ASSAY OF BLOOD CHLORIDE: CPT

## 2017-10-04 PROCEDURE — 97530 THERAPEUTIC ACTIVITIES: CPT

## 2017-10-04 PROCEDURE — 94002 VENT MGMT INPAT INIT DAY: CPT

## 2017-10-04 PROCEDURE — 85027 COMPLETE CBC AUTOMATED: CPT

## 2017-10-04 PROCEDURE — 36430 TRANSFUSION BLD/BLD COMPNT: CPT

## 2017-10-04 PROCEDURE — C1751: CPT

## 2017-10-04 PROCEDURE — 97162 PT EVAL MOD COMPLEX 30 MIN: CPT

## 2017-10-04 PROCEDURE — 83880 ASSAY OF NATRIURETIC PEPTIDE: CPT

## 2017-10-04 PROCEDURE — C8929: CPT

## 2017-10-04 PROCEDURE — 84295 ASSAY OF SERUM SODIUM: CPT

## 2017-10-04 PROCEDURE — 84100 ASSAY OF PHOSPHORUS: CPT

## 2017-10-04 PROCEDURE — 82550 ASSAY OF CK (CPK): CPT

## 2017-10-04 PROCEDURE — 80053 COMPREHEN METABOLIC PANEL: CPT

## 2017-10-04 PROCEDURE — 82330 ASSAY OF CALCIUM: CPT

## 2017-10-04 PROCEDURE — C1729: CPT

## 2017-10-04 PROCEDURE — 85576 BLOOD PLATELET AGGREGATION: CPT

## 2017-10-04 PROCEDURE — P9047: CPT

## 2017-10-04 PROCEDURE — 85610 PROTHROMBIN TIME: CPT

## 2017-10-04 PROCEDURE — 84132 ASSAY OF SERUM POTASSIUM: CPT

## 2017-10-04 RX ORDER — FERROUS SULFATE 325(65) MG
1 TABLET ORAL
Qty: 60 | Refills: 0
Start: 2017-10-04 | End: 2017-11-03

## 2017-10-04 RX ORDER — INSULIN ASPART 100 [IU]/ML
22 INJECTION, SOLUTION SUBCUTANEOUS
Qty: 0 | Refills: 0 | DISCHARGE
Start: 2017-10-04 | End: 2017-11-03

## 2017-10-04 RX ORDER — METOPROLOL TARTRATE 50 MG
1 TABLET ORAL
Qty: 90 | Refills: 0
Start: 2017-10-04 | End: 2017-11-03

## 2017-10-04 RX ORDER — CLOPIDOGREL BISULFATE 75 MG/1
1 TABLET, FILM COATED ORAL
Qty: 30 | Refills: 0
Start: 2017-10-04 | End: 2017-11-03

## 2017-10-04 RX ORDER — CHLORHEXIDINE GLUCONATE 4 %
325 (65 FE) LIQUID (ML) TOPICAL TWICE DAILY
Refills: 0 | Status: ACTIVE | COMMUNITY

## 2017-10-04 RX ORDER — ATORVASTATIN CALCIUM 80 MG/1
1 TABLET, FILM COATED ORAL
Qty: 0 | Refills: 0 | COMMUNITY

## 2017-10-04 RX ORDER — CLOPIDOGREL BISULFATE 75 MG/1
1 TABLET, FILM COATED ORAL
Qty: 0 | Refills: 0 | COMMUNITY

## 2017-10-04 RX ORDER — LISINOPRIL 2.5 MG/1
1 TABLET ORAL
Qty: 0 | Refills: 0 | COMMUNITY

## 2017-10-04 RX ORDER — METOPROLOL TARTRATE 25 MG/1
25 TABLET, FILM COATED ORAL EVERY 8 HOURS
Refills: 0 | Status: ACTIVE | COMMUNITY

## 2017-10-04 RX ORDER — INSULIN ASPART 100 [IU]/ML
16 INJECTION, SOLUTION SUBCUTANEOUS
Qty: 1 | Refills: 0
Start: 2017-10-04 | End: 2017-11-03

## 2017-10-04 RX ORDER — INSULIN DETEMIR 100/ML (3)
56 INSULIN PEN (ML) SUBCUTANEOUS
Qty: 1 | Refills: 0
Start: 2017-10-04 | End: 2017-11-03

## 2017-10-04 RX ORDER — MUPIROCIN 20 MG/G
2 OINTMENT TOPICAL TWICE DAILY
Qty: 1 | Refills: 0 | Status: DISCONTINUED | COMMUNITY
End: 2017-10-04

## 2017-10-04 RX ORDER — FOLIC ACID 0.8 MG
1 TABLET ORAL
Qty: 30 | Refills: 0
Start: 2017-10-04 | End: 2017-11-03

## 2017-10-04 RX ORDER — PANTOPRAZOLE SODIUM 20 MG/1
1 TABLET, DELAYED RELEASE ORAL
Qty: 7 | Refills: 0
Start: 2017-10-04 | End: 2017-10-11

## 2017-10-04 RX ORDER — ATORVASTATIN CALCIUM 80 MG/1
1 TABLET, FILM COATED ORAL
Qty: 30 | Refills: 0
Start: 2017-10-04 | End: 2017-11-03

## 2017-10-04 RX ORDER — INSULIN DETEMIR 100/ML (3)
30 INSULIN PEN (ML) SUBCUTANEOUS
Qty: 0 | Refills: 0 | DISCHARGE
Start: 2017-10-04 | End: 2017-11-03

## 2017-10-04 RX ORDER — ASCORBIC ACID 60 MG
1 TABLET,CHEWABLE ORAL
Qty: 30 | Refills: 0
Start: 2017-10-04 | End: 2017-11-03

## 2017-10-04 RX ORDER — POTASSIUM CHLORIDE 20 MEQ
20 PACKET (EA) ORAL ONCE
Qty: 0 | Refills: 0 | Status: COMPLETED | OUTPATIENT
Start: 2017-10-04 | End: 2017-10-04

## 2017-10-04 RX ORDER — DOCUSATE SODIUM 100 MG
1 CAPSULE ORAL
Qty: 21 | Refills: 0
Start: 2017-10-04 | End: 2017-10-11

## 2017-10-04 RX ORDER — ASPIRIN/CALCIUM CARB/MAGNESIUM 324 MG
1 TABLET ORAL
Qty: 30 | Refills: 0
Start: 2017-10-04 | End: 2017-11-03

## 2017-10-04 RX ADMIN — Medication 16 UNIT(S): at 08:02

## 2017-10-04 RX ADMIN — SODIUM CHLORIDE 3 MILLILITER(S): 9 INJECTION INTRAMUSCULAR; INTRAVENOUS; SUBCUTANEOUS at 12:01

## 2017-10-04 RX ADMIN — Medication 1 MILLIGRAM(S): at 12:46

## 2017-10-04 RX ADMIN — SODIUM CHLORIDE 3 MILLILITER(S): 9 INJECTION INTRAMUSCULAR; INTRAVENOUS; SUBCUTANEOUS at 05:19

## 2017-10-04 RX ADMIN — OXYCODONE AND ACETAMINOPHEN 2 TABLET(S): 5; 325 TABLET ORAL at 03:34

## 2017-10-04 RX ADMIN — Medication 25 MILLIGRAM(S): at 05:20

## 2017-10-04 RX ADMIN — Medication 25 MILLIGRAM(S): at 12:46

## 2017-10-04 RX ADMIN — PANTOPRAZOLE SODIUM 40 MILLIGRAM(S): 20 TABLET, DELAYED RELEASE ORAL at 05:20

## 2017-10-04 RX ADMIN — Medication 20 MILLIEQUIVALENT(S): at 10:01

## 2017-10-04 RX ADMIN — Medication 81 MILLIGRAM(S): at 12:46

## 2017-10-04 RX ADMIN — Medication 100 MILLIGRAM(S): at 05:20

## 2017-10-04 RX ADMIN — CLOPIDOGREL BISULFATE 75 MILLIGRAM(S): 75 TABLET, FILM COATED ORAL at 12:46

## 2017-10-04 RX ADMIN — Medication 500 MILLIGRAM(S): at 12:46

## 2017-10-04 RX ADMIN — Medication 325 MILLIGRAM(S): at 08:02

## 2017-10-04 RX ADMIN — OXYCODONE AND ACETAMINOPHEN 2 TABLET(S): 5; 325 TABLET ORAL at 02:46

## 2017-10-04 RX ADMIN — ENOXAPARIN SODIUM 30 MILLIGRAM(S): 100 INJECTION SUBCUTANEOUS at 10:01

## 2017-10-04 NOTE — DISCHARGE NOTE ADULT - MEDICATION SUMMARY - MEDICATIONS TO CHANGE
I will SWITCH the dose or number of times a day I take the medications listed below when I get home from the hospital:    metoprolol succinate 50 mg oral tablet, extended release  -- 1 tab(s) by mouth once a day    Aspirin Enteric Coated 325 mg oral delayed release tablet  -- 1 tab(s) by mouth once a day ( continue )    Lantus 100 units/mL subcutaneous solution  -- 15 unit(s) subcutaneous once a day (at bedtime)- no changes

## 2017-10-04 NOTE — DISCHARGE NOTE ADULT - NS AS ACTIVITY OBS
Walking-Indoors allowed/Do not drive or operate machinery/Walking-Outdoors allowed/Stairs allowed/Showering allowed/No Heavy lifting/straining

## 2017-10-04 NOTE — PROGRESS NOTE ADULT - PROBLEM SELECTOR PLAN 3
Will continue statin, primary team FU
C/w insulin gtt. Wean as tolerated and transition to SQ insulin regimen.
Transfuse 1U PRBC today. Trend CBC.
on ASA plavix
Will continue statin, primary team FU
on ASA plavix

## 2017-10-04 NOTE — DISCHARGE NOTE ADULT - HOSPITAL COURSE
This is a  51 y/o  male PMH CAD, PCI ,HTN,DM2 HLD, belles palsy, found to have multi vessel disease s/p  cardiac cath 8/18/17  preoperative lab workup revealed leukocytosis- UA negative  blood cx drawn and Urine culture sent. Patient directly admitted to  [facility] placed on heparin gtt. Last dose plavix was on  9/24.  Endocrine consulted ID consulted., scheduled for CABG  9/29 09/29/2017  CABG X 5 (LIMA to LAD, SVG to PDA, SVG to Ramus, SVG to OM, SVG to Diag)  Post op elevated glucose now controlled on insulin, endocrine following.  On inotropes- weaned off  Acute blood loss anemia 2/2 surgery requiring multiple PRBC transfusions.  Beta blocker started.  10/2 Transferred to 2 rakesh  10/3 c/o dizziness - pt states occurred pre-op, check TTE: negative pericardial effusion. H/H 7.8/24 transfuse 1U PRBC. H/H now 9.5/27.8.  10/4 Discharged home per Dr. Sigala.

## 2017-10-04 NOTE — PROGRESS NOTE ADULT - SUBJECTIVE AND OBJECTIVE BOX
Chief complaint  Patient is a 52y old  Male who presents with a chief complaint of s/p 9/29/17 CABGx5 LIMA (04 Oct 2017 10:33)   Review of systems  Patient in bed, comfortable, no fever,  no hypoglycemia.    Labs and Fingersticks    CAPILLARY BLOOD GLUCOSE  179 (04 Oct 2017 11:57)  122 (04 Oct 2017 07:37)  108 (03 Oct 2017 22:14)  210 (03 Oct 2017 17:18)  128 (03 Oct 2017 12:27)  125 (03 Oct 2017 07:55)  145 (02 Oct 2017 21:43)  211 (02 Oct 2017 16:01)  259 (02 Oct 2017 12:15)  218 (02 Oct 2017 08:00)  242 (02 Oct 2017 02:00)  282 (01 Oct 2017 22:00)    Anion Gap, Serum: 15 (10-04 @ 05:23)  Anion Gap, Serum: 11 (10-03 @ 05:33)      Calcium, Total Serum: 8.3 <L> (10-04 @ 05:23)  Calcium, Total Serum: 7.9 <L> (10-03 @ 05:33)          10-04    139  |  98  |  12  ----------------------------<  134<H>  3.8   |  26  |  0.61    Ca    8.3<L>      04 Oct 2017 05:23                          9.5    19.4  )-----------( 371      ( 04 Oct 2017 05:23 )             27.8     Medications  MEDICATIONS  (STANDING):  ascorbic acid 500 milliGRAM(s) Oral daily  aspirin enteric coated 81 milliGRAM(s) Oral daily  atorvastatin 80 milliGRAM(s) Oral at bedtime  clopidogrel Tablet 75 milliGRAM(s) Oral daily  dextrose 5%. 1000 milliLiter(s) (50 mL/Hr) IV Continuous <Continuous>  dextrose 50% Injectable 12.5 Gram(s) IV Push once  dextrose 50% Injectable 25 Gram(s) IV Push once  docusate sodium 100 milliGRAM(s) Oral three times a day  enoxaparin Injectable 30 milliGRAM(s) SubCutaneous every 12 hours  ferrous    sulfate 325 milliGRAM(s) Oral two times a day with meals  folic acid 1 milliGRAM(s) Oral daily  insulin glargine Injectable (LANTUS) 56 Unit(s) SubCutaneous at bedtime  insulin lispro (HumaLOG) corrective regimen sliding scale   SubCutaneous three times a day before meals  insulin lispro (HumaLOG) corrective regimen sliding scale   SubCutaneous at bedtime  insulin lispro Injectable (HumaLOG) 16 Unit(s) SubCutaneous three times a day with meals  metoprolol 25 milliGRAM(s) Oral every 8 hours  pantoprazole    Tablet 40 milliGRAM(s) Oral before breakfast  senna 2 Tablet(s) Oral at bedtime  sodium chloride 0.9% lock flush 3 milliLiter(s) IV Push every 8 hours      Physical Exam  General: Patient comfortable in bed  Vital Signs Last 12 Hrs  T(F): 98 (10-04-17 @ 05:16), Max: 98 (10-04-17 @ 05:16)  HR: 83 (10-04-17 @ 08:45) (81 - 83)  BP: 134/73 (10-04-17 @ 08:45) (119/81 - 134/73)  BP(mean): --  RR: 18 (10-04-17 @ 08:45) (18 - 18)  SpO2: 93% (10-04-17 @ 08:45) (93% - 93%)  Neck: No palpable thyroid nodules.  CVS: S1S2, No murmurs  Respiratory: No wheezing, no crepitations  GI: Abdomen soft, bowel sounds positive  Musculoskeletal: Positive edema lower extremities bilaterally  Skin: No skin rashes, no ecchymosis    Diagnostics

## 2017-10-04 NOTE — DISCHARGE NOTE ADULT - PLAN OF CARE
Complete Recovery 1. Daily Shower  2. Weight yourself daily and notify any weight gain greater than 2-3 pounds in 24 hours.  3. Diabetic diet - low fat, low cholesterol, no added salt.  4. Cleanse Midsternal incision and leg incision daily while showering with warm water and mild soap, pat dry and maintain open to air.   5. Follow Cardiac Surgery Do's and Don'ts discharge instructions.

## 2017-10-04 NOTE — PROGRESS NOTE ADULT - PROBLEM SELECTOR PROBLEM 2
Type 2 diabetes mellitus without complication, with long-term current use of insulin
CAD (coronary artery disease)
CAD (coronary artery disease)
Mixed hyperlipidemia
Type 2 diabetes mellitus with other circulatory complication, unspecified long term insulin use status
Type 2 diabetes mellitus without complication, with long-term current use of insulin
CAD (coronary artery disease)
CAD (coronary artery disease)
Type 2 diabetes mellitus with other circulatory complication, unspecified long term insulin use status
CAD (coronary artery disease)

## 2017-10-04 NOTE — DISCHARGE NOTE ADULT - CARE PROVIDER_API CALL
Radha Sigala), Thoracic and Cardiac Surgery  [address] [place] [address]  Phone: [phone]  Fax: [phone]    Susana Coulter), Cardiology  [address] [place] [address]  Phone: [phone]  Fax: [phone] Radha Sigala), Thoracic and Cardiac Surgery  [address] [place] [address]  Phone: [phone]  Fax: [phone]    Susana Coulter), Cardiology  [address] [place] [address]  Phone: [phone]  Fax: [phone]

## 2017-10-04 NOTE — DISCHARGE NOTE ADULT - MEDICATION SUMMARY - MEDICATIONS TO TAKE
I will START or STAY ON the medications listed below when I get home from the hospital:    aspirin 81 mg oral delayed release tablet  -- 1 tab(s) by mouth once a day  -- Indication: For Anti-platelet    oxyCODONE-acetaminophen 5 mg-325 mg oral tablet  -- 1 tab(s) by mouth every 4 hours, As Needed - Severe pain (7-10) MDD:6   -- Indication: For Pain    NovoLOG FlexPen 100 units/mL subcutaneous solution  -- 16 unit(s) subcutaneous 3 times a day (with meals)   -- Do not drink alcoholic beverages when taking this medication.  Keep in refrigerator.  Do not freeze.  Obtain medical advice before taking any non-prescription drugs as some may affect the action of this medication.    -- Indication: For DM2    Januvia 100 mg oral tablet  -- 1 tab(s) by mouth once a day (hold the morning of procedure )   -- Indication: For DM2    metFORMIN 1000 mg oral tablet  -- 1 tab(s) by mouth 2 times a day  ( hold the morning of procedure )   -- Indication: For DM2    Levemir FlexTouch 100 units/mL subcutaneous solution  -- 56 unit(s) subcutaneous once a day (at bedtime)   -- Check with your doctor before becoming pregnant.  Do not drink alcoholic beverages when taking this medication.  Keep in refrigerator.  Do not freeze.    -- Indication: For DM2    atorvastatin 80 mg oral tablet  -- 1 tab(s) by mouth once a day (at bedtime)  -- Indication: For Cholesterol    clopidogrel 75 mg oral tablet  -- 1 tab(s) by mouth once a day  -- Indication: For Anti-platelet    metoprolol tartrate 25 mg oral tablet  -- 1 tab(s) by mouth every 8 hours  -- Indication: For Heart rate/blood pressure    ferrous sulfate 325 mg (65 mg elemental iron) oral tablet  -- 1 tab(s) by mouth 2 times a day (with meals)   -- Indication: For Supplement for anemia    docusate sodium 100 mg oral capsule  -- 1 cap(s) by mouth 3 times a day  -- Indication: For Stool softener    pantoprazole 40 mg oral delayed release tablet  -- 1 tab(s) by mouth once a day (before a meal)  -- Indication: For gastric acid reducer    folic acid 1 mg oral tablet  -- 1 tab(s) by mouth once a day  -- Indication: For Supplement for anemia    ascorbic acid 500 mg oral tablet  -- 1 tab(s) by mouth once a day  -- Indication: For Supplement for anemia

## 2017-10-04 NOTE — DISCHARGE NOTE ADULT - CARE PROVIDERS DIRECT ADDRESSES
[email or web address]

## 2017-10-04 NOTE — DISCHARGE NOTE ADULT - OTHER SIGNIFICANT FINDINGS
PHYSICAL EXAM  Neurology: A&Ox3, nonfocal, no gross deficits  CV : RRR+S1S2  Sternal Wound: MSI CDI RAMÓN, Stable  Lungs: Respirations non-labored, B/L BS clear, diminished at bases  Abdomen: Soft, NT/ND, +BSx4Q, last BM 10/2 (-)N/V/D  : Voiding without difficulty  Extremities: B/L LE trace edema, negative calf tenderness, +PP                   B/L SVG incision CDI ecchymotic

## 2017-10-04 NOTE — PROVIDER CONTACT NOTE (OTHER) - ACTION/TREATMENT ORDERED:
NP Glowacki aware, will administer supplemental K as per orders. Pt safety maintained. Call bell in easy reach. Will continue to monitor HR.

## 2017-10-04 NOTE — PROGRESS NOTE ADULT - PROBLEM SELECTOR PLAN 4
On meds primary team following up
C/w GI/DVT prophylaxis  C/w pain control  C/w glucose control
On meds primary team following up
continue statin
continue statin
negative orthostatic BP  Check TTE r/o effusion  Neurology consult.

## 2017-10-04 NOTE — DISCHARGE NOTE ADULT - PATIENT PORTAL LINK FT
“You can access the [employer] Patient Portal, offered by [facility], by registering with the following website: [email or web address]”

## 2017-10-04 NOTE — PROGRESS NOTE ADULT - PROBLEM SELECTOR PLAN 2
Monitored and followed up by [facility]ICU/cardiology team
Endocrine f/u
Monitored and followed up by [facility]ICU/cardiology team
Monitored and followed up by [facility]ICU/cardiology team
C/w statin.
Continue current Humalog and Lantus insulin regimen.  Diabetic diet, check FS AC/HS.  Glycemic control per Endocrine.
continue coverage
Monitored and followed up by [facility]ICU/cardiology team
Monitored and followed up by [facility]ICU/cardiology team
continue coverage

## 2017-10-04 NOTE — PROGRESS NOTE ADULT - PROBLEM SELECTOR PROBLEM 1
DM (diabetes mellitus)
DM (diabetes mellitus)
Coronary artery disease involving native coronary artery of native heart with other form of angina pectoris
Coronary artery disease involving native coronary artery of native heart with other form of angina pectoris
S/P CABG x 5
DM (diabetes mellitus)
DM (diabetes mellitus)
S/P CABG x 5
Coronary artery disease involving native coronary artery of native heart with other form of angina pectoris
DM (diabetes mellitus)

## 2017-10-04 NOTE — DISCHARGE NOTE ADULT - CARE PLAN
Principal Discharge DX:	S/P CABG x 5  Goal:	Complete Recovery  Instructions for follow-up, activity and diet:	1. Daily Shower  2. Weight yourself daily and notify any weight gain greater than 2-3 pounds in 24 hours.  3. Diabetic diet - low fat, low cholesterol, no added salt.  4. Cleanse Midsternal incision and leg incision daily while showering with warm water and mild soap, pat dry and maintain open to air.   5. Follow Cardiac Surgery Do's and Don'ts discharge instructions.

## 2017-10-04 NOTE — PROGRESS NOTE ADULT - PROBLEM SELECTOR PROBLEM 4
HTN (hypertension)
HTN (hypertension)
Familial hypercholesterolemia
HTN (hypertension)
HTN (hypertension)
Dizziness, nonspecific
Familial hypercholesterolemia
Prophylactic measure
HTN (hypertension)

## 2017-10-04 NOTE — PROGRESS NOTE ADULT - PROBLEM SELECTOR PROBLEM 3
HLD (hyperlipidemia)
Bilateral carotid artery disease
HLD (hyperlipidemia)
HLD (hyperlipidemia)
Anemia due to blood loss
Bilateral carotid artery disease
Type 2 diabetes mellitus without complication, with long-term current use of insulin
HLD (hyperlipidemia)
HLD (hyperlipidemia)

## 2017-10-04 NOTE — DISCHARGE NOTE ADULT - INSTRUCTIONS
Diabetic diet - low fat, low cholesterol, no added salt. Check fingersticks before meals and at bedtime. Diabetic diet - low fat, low cholesterol, no added salt. Check fingersticks before meals and at bedtime. Record levels.

## 2017-10-04 NOTE — DISCHARGE NOTE ADULT - ADDITIONAL INSTRUCTIONS
Follow up with Dr. cramer at CTS office at [facility], call [phone] to confirm appointment.  Please schedule an appointment with your Cardiologist in 1-2 weeks, please call the office to schedule an appointment.    Please schedule an appointment with your PCP in 1 week, call the office to schedule an appointment. Follow up with Dr. Sigala on Friday 10/13 at 10:30am at CTS office at [facility], call [phone] to confirm appointment.  Please schedule an appointment with your Cardiologist, Dr. Coulter in 1-2 weeks, call the office to schedule an appointment.    Please schedule an appointment with your PCP, Dr. Kimber Pulido in 1-2 weeks, call the office to schedule an appointment. Follow up with Dr. Sigala on Friday 10/13 at 10:30am at CTS office at [facility], call [phone] to confirm appointment.  Please schedule an appointment with your Cardiologist, Dr. Coulter in 1-2 weeks, call the office to schedule an appointment.    Please schedule an appointment with your PCP, Dr. Pulido in 1-2 weeks, call the office to schedule an appointment. Follow up with Dr. Sigala on Friday 10/13 at 10:30am at CTS office at [facility], call [phone] to confirm appointment.  Please schedule an appointment with your Cardiologist, Dr. Coulter in 1-2 weeks, call the office to schedule an appointment.    Please schedule an appointment with your PCP, Dr. Pulido in 1-2 weeks, call the office to schedule an appointment.  Follow up with your endocrinologist in 1-2 weeks.

## 2017-10-04 NOTE — PROGRESS NOTE ADULT - PROVIDER SPECIALTY LIST ADULT
CT Surgery
CT Surgery
CTU
Critical Care
Electrophysiology
Electrophysiology
Endocrinology
CT Surgery
Endocrinology

## 2017-10-13 ENCOUNTER — APPOINTMENT (OUTPATIENT)
Dept: CARDIOTHORACIC SURGERY | Facility: CLINIC | Age: 52
End: 2017-10-13
Payer: MEDICAID

## 2017-10-13 ENCOUNTER — OTHER (OUTPATIENT)
Age: 52
End: 2017-10-13

## 2017-10-13 VITALS
TEMPERATURE: 98.5 F | SYSTOLIC BLOOD PRESSURE: 118 MMHG | HEART RATE: 88 BPM | RESPIRATION RATE: 12 BRPM | OXYGEN SATURATION: 98 % | DIASTOLIC BLOOD PRESSURE: 65 MMHG

## 2017-10-13 VITALS — WEIGHT: 270 LBS | HEIGHT: 67 IN | BODY MASS INDEX: 42.38 KG/M2

## 2017-10-13 PROCEDURE — 99024 POSTOP FOLLOW-UP VISIT: CPT

## 2017-10-13 RX ORDER — DOCUSATE SODIUM 100 MG/1
100 CAPSULE, LIQUID FILLED ORAL 3 TIMES DAILY
Refills: 0 | Status: DISCONTINUED | COMMUNITY
End: 2017-10-13

## 2017-10-30 RX ADMIN — HYDROMORPHONE HYDROCHLORIDE 0.5 MILLIGRAM(S): 2 INJECTION INTRAMUSCULAR; INTRAVENOUS; SUBCUTANEOUS at 20:45

## 2017-11-16 PROBLEM — Z95.1 STATUS POST FIVE VESSEL CORONARY ARTERY BYPASS: Status: ACTIVE | Noted: 2017-10-13

## 2017-11-17 ENCOUNTER — APPOINTMENT (OUTPATIENT)
Dept: CARDIOTHORACIC SURGERY | Facility: CLINIC | Age: 52
End: 2017-11-17
Payer: MEDICAID

## 2017-11-17 VITALS
HEART RATE: 84 BPM | DIASTOLIC BLOOD PRESSURE: 87 MMHG | SYSTOLIC BLOOD PRESSURE: 149 MMHG | HEIGHT: 67 IN | RESPIRATION RATE: 15 BRPM | WEIGHT: 265 LBS | OXYGEN SATURATION: 99 % | TEMPERATURE: 99.1 F | BODY MASS INDEX: 41.59 KG/M2

## 2017-11-17 VITALS — DIASTOLIC BLOOD PRESSURE: 78 MMHG | SYSTOLIC BLOOD PRESSURE: 136 MMHG

## 2017-11-17 DIAGNOSIS — Z95.1 PRESENCE OF AORTOCORONARY BYPASS GRAFT: ICD-10-CM

## 2017-11-17 DIAGNOSIS — Z09 ENCOUNTER FOR FOLLOW-UP EXAMINATION AFTER COMPLETED TREATMENT FOR CONDITIONS OTHER THAN MALIGNANT NEOPLASM: ICD-10-CM

## 2017-11-17 PROCEDURE — 99024 POSTOP FOLLOW-UP VISIT: CPT

## 2017-11-29 ENCOUNTER — APPOINTMENT (OUTPATIENT)
Dept: CARDIOTHORACIC SURGERY | Facility: CLINIC | Age: 52
End: 2017-11-29
Payer: MEDICAID

## 2017-11-29 VITALS
HEIGHT: 67 IN | RESPIRATION RATE: 15 BRPM | TEMPERATURE: 98.8 F | DIASTOLIC BLOOD PRESSURE: 69 MMHG | BODY MASS INDEX: 38.3 KG/M2 | SYSTOLIC BLOOD PRESSURE: 122 MMHG | WEIGHT: 244 LBS | OXYGEN SATURATION: 97 % | HEART RATE: 82 BPM

## 2017-11-29 DIAGNOSIS — T81.89XA OTHER COMPLICATIONS OF PROCEDURES, NOT ELSEWHERE CLASSIFIED, INITIAL ENCOUNTER: ICD-10-CM

## 2017-11-29 PROCEDURE — 99024 POSTOP FOLLOW-UP VISIT: CPT

## 2017-11-29 RX ORDER — PANTOPRAZOLE 40 MG/1
40 TABLET, DELAYED RELEASE ORAL DAILY
Refills: 0 | Status: DISCONTINUED | COMMUNITY
End: 2017-11-29

## 2017-11-29 RX ORDER — FOLIC ACID 1 MG/1
1 TABLET ORAL DAILY
Refills: 0 | Status: DISCONTINUED | COMMUNITY
End: 2017-11-29

## 2017-12-13 PROBLEM — Z95.1 S/P CABG X 5: Status: ACTIVE | Noted: 2017-11-17

## 2017-12-15 ENCOUNTER — APPOINTMENT (OUTPATIENT)
Dept: CARDIOTHORACIC SURGERY | Facility: CLINIC | Age: 52
End: 2017-12-15
Payer: MEDICAID

## 2017-12-15 VITALS
HEIGHT: 67 IN | TEMPERATURE: 98.3 F | BODY MASS INDEX: 37.51 KG/M2 | OXYGEN SATURATION: 98 % | WEIGHT: 239 LBS | RESPIRATION RATE: 14 BRPM

## 2017-12-15 VITALS — DIASTOLIC BLOOD PRESSURE: 83 MMHG | SYSTOLIC BLOOD PRESSURE: 113 MMHG

## 2017-12-15 DIAGNOSIS — Z95.1 PRESENCE OF AORTOCORONARY BYPASS GRAFT: ICD-10-CM

## 2017-12-15 PROCEDURE — 99024 POSTOP FOLLOW-UP VISIT: CPT

## 2017-12-18 PROCEDURE — 99152 MOD SED SAME PHYS/QHP 5/>YRS: CPT

## 2017-12-18 PROCEDURE — 83036 HEMOGLOBIN GLYCOSYLATED A1C: CPT

## 2017-12-18 PROCEDURE — 85027 COMPLETE CBC AUTOMATED: CPT

## 2017-12-18 PROCEDURE — C1887: CPT

## 2017-12-18 PROCEDURE — 93005 ELECTROCARDIOGRAM TRACING: CPT

## 2017-12-18 PROCEDURE — C8929: CPT

## 2017-12-18 PROCEDURE — 80053 COMPREHEN METABOLIC PANEL: CPT

## 2017-12-18 PROCEDURE — 99153 MOD SED SAME PHYS/QHP EA: CPT

## 2017-12-18 PROCEDURE — 93460 R&L HRT ART/VENTRICLE ANGIO: CPT

## 2017-12-18 PROCEDURE — C1769: CPT

## 2017-12-18 PROCEDURE — C1894: CPT

## 2018-01-02 PROBLEM — Z09 POSTOP CHECK: Status: ACTIVE | Noted: 2017-11-17

## 2018-01-05 ENCOUNTER — APPOINTMENT (OUTPATIENT)
Dept: CARDIOTHORACIC SURGERY | Facility: CLINIC | Age: 53
End: 2018-01-05

## 2018-01-05 DIAGNOSIS — Z09 ENCOUNTER FOR FOLLOW-UP EXAMINATION AFTER COMPLETED TREATMENT FOR CONDITIONS OTHER THAN MALIGNANT NEOPLASM: ICD-10-CM

## 2018-01-12 ENCOUNTER — APPOINTMENT (OUTPATIENT)
Dept: CARDIOTHORACIC SURGERY | Facility: CLINIC | Age: 53
End: 2018-01-12
Payer: MEDICAID

## 2018-01-18 ENCOUNTER — APPOINTMENT (OUTPATIENT)
Dept: CV DIAGNOSTICS | Facility: HOSPITAL | Age: 53
End: 2018-01-18

## 2018-01-18 ENCOUNTER — OUTPATIENT (OUTPATIENT)
Dept: OUTPATIENT SERVICES | Facility: HOSPITAL | Age: 53
LOS: 1 days | End: 2018-01-18
Payer: MEDICAID

## 2018-01-18 DIAGNOSIS — I25.10 ATHEROSCLEROTIC HEART DISEASE OF NATIVE CORONARY ARTERY WITHOUT ANGINA PECTORIS: ICD-10-CM

## 2018-01-18 DIAGNOSIS — S42.309A UNSPECIFIED FRACTURE OF SHAFT OF HUMERUS, UNSPECIFIED ARM, INITIAL ENCOUNTER FOR CLOSED FRACTURE: Chronic | ICD-10-CM

## 2018-01-18 DIAGNOSIS — Z95.5 PRESENCE OF CORONARY ANGIOPLASTY IMPLANT AND GRAFT: Chronic | ICD-10-CM

## 2018-01-18 PROCEDURE — 78452 HT MUSCLE IMAGE SPECT MULT: CPT | Mod: 26

## 2018-01-18 PROCEDURE — 78452 HT MUSCLE IMAGE SPECT MULT: CPT

## 2018-01-18 PROCEDURE — A9500: CPT

## 2018-01-18 PROCEDURE — 93017 CV STRESS TEST TRACING ONLY: CPT

## 2018-01-18 PROCEDURE — 93018 CV STRESS TEST I&R ONLY: CPT

## 2018-01-18 PROCEDURE — 93016 CV STRESS TEST SUPVJ ONLY: CPT

## 2018-01-19 ENCOUNTER — OTHER (OUTPATIENT)
Age: 53
End: 2018-01-19

## 2018-01-29 ENCOUNTER — APPOINTMENT (OUTPATIENT)
Dept: CARDIOTHORACIC SURGERY | Facility: CLINIC | Age: 53
End: 2018-01-29
Payer: MEDICAID

## 2018-01-29 ENCOUNTER — OTHER (OUTPATIENT)
Age: 53
End: 2018-01-29

## 2018-01-29 VITALS
TEMPERATURE: 97.8 F | OXYGEN SATURATION: 97 % | RESPIRATION RATE: 15 BRPM | WEIGHT: 245 LBS | SYSTOLIC BLOOD PRESSURE: 139 MMHG | HEART RATE: 81 BPM | DIASTOLIC BLOOD PRESSURE: 88 MMHG | BODY MASS INDEX: 38.45 KG/M2 | HEIGHT: 67 IN

## 2018-01-29 PROCEDURE — 99214 OFFICE O/P EST MOD 30 MIN: CPT

## 2018-01-29 RX ORDER — INSULIN GLARGINE 100 [IU]/ML
100 INJECTION, SOLUTION SUBCUTANEOUS
Qty: 15 | Refills: 0 | Status: DISCONTINUED | COMMUNITY
Start: 2017-10-04

## 2018-01-29 RX ORDER — FUROSEMIDE 40 MG/1
40 TABLET ORAL
Qty: 5 | Refills: 0 | Status: DISCONTINUED | COMMUNITY
Start: 2017-10-06

## 2018-01-29 RX ORDER — ASPIRIN 81 MG/1
81 TABLET ORAL
Qty: 30 | Refills: 0 | Status: DISCONTINUED | COMMUNITY
Start: 2017-10-04

## 2018-01-29 RX ORDER — ESOMEPRAZOLE MAGNESIUM 40 MG/1
40 CAPSULE, DELAYED RELEASE ORAL
Qty: 30 | Refills: 0 | Status: ACTIVE | COMMUNITY
Start: 2017-12-07

## 2018-01-29 RX ORDER — INSULIN ASPART 100 [IU]/ML
100 INJECTION, SOLUTION INTRAVENOUS; SUBCUTANEOUS
Refills: 0 | Status: DISCONTINUED | COMMUNITY
End: 2018-01-29

## 2018-01-29 RX ORDER — BLOOD SUGAR DIAGNOSTIC
STRIP MISCELLANEOUS
Qty: 100 | Refills: 0 | Status: ACTIVE | COMMUNITY
Start: 2017-12-07

## 2018-01-29 RX ORDER — MULTIVIT-MIN/FOLIC/VIT K/LYCOP 400-300MCG
500 TABLET ORAL DAILY
Refills: 0 | Status: DISCONTINUED | COMMUNITY
End: 2018-01-29

## 2018-01-29 RX ORDER — ASCORBIC ACID 500 MG
500 CAPSULE, EXTENDED RELEASE ORAL
Qty: 30 | Refills: 0 | Status: DISCONTINUED | COMMUNITY
Start: 2017-10-04

## 2018-01-29 RX ORDER — PEN NEEDLE, DIABETIC 29 G X1/2"
32G X 4 MM NEEDLE, DISPOSABLE MISCELLANEOUS
Qty: 100 | Refills: 0 | Status: ACTIVE | COMMUNITY
Start: 2017-10-04

## 2018-01-29 RX ORDER — INSULIN GLULISINE 100 [IU]/ML
100 INJECTION, SOLUTION SUBCUTANEOUS
Qty: 15 | Refills: 0 | Status: DISCONTINUED | COMMUNITY
Start: 2017-10-04

## 2018-01-29 RX ORDER — POTASSIUM CHLORIDE 1500 MG/1
20 TABLET, EXTENDED RELEASE ORAL
Qty: 5 | Refills: 0 | Status: DISCONTINUED | COMMUNITY
Start: 2017-10-06

## 2018-01-29 RX ORDER — AMOXICILLIN AND CLAVULANATE POTASSIUM 875; 125 MG/1; MG/1
875-125 TABLET, COATED ORAL
Qty: 20 | Refills: 0 | Status: DISCONTINUED | COMMUNITY
Start: 2017-12-15 | End: 2018-01-29

## 2018-01-29 RX ORDER — OXYCODONE AND ACETAMINOPHEN 5; 325 MG/1; MG/1
5-325 TABLET ORAL
Qty: 30 | Refills: 0 | Status: DISCONTINUED | COMMUNITY
Start: 2017-10-04

## 2018-01-29 RX ORDER — ATORVASTATIN CALCIUM 40 MG/1
40 TABLET, FILM COATED ORAL
Qty: 30 | Refills: 0 | Status: DISCONTINUED | COMMUNITY
Start: 2017-07-28

## 2018-01-29 RX ORDER — ASPIRIN 325 MG/1
325 TABLET ORAL
Qty: 30 | Refills: 0 | Status: DISCONTINUED | COMMUNITY
Start: 2017-07-28

## 2018-07-05 ENCOUNTER — EMERGENCY (EMERGENCY)
Facility: HOSPITAL | Age: 53
LOS: 1 days | Discharge: HOME | End: 2018-07-05
Admitting: EMERGENCY MEDICINE
Payer: MEDICAID

## 2018-07-05 VITALS
DIASTOLIC BLOOD PRESSURE: 73 MMHG | TEMPERATURE: 99 F | SYSTOLIC BLOOD PRESSURE: 175 MMHG | WEIGHT: 253.09 LBS | OXYGEN SATURATION: 97 % | HEART RATE: 79 BPM | RESPIRATION RATE: 18 BRPM

## 2018-07-05 DIAGNOSIS — Z95.5 PRESENCE OF CORONARY ANGIOPLASTY IMPLANT AND GRAFT: Chronic | ICD-10-CM

## 2018-07-05 DIAGNOSIS — S42.309A UNSPECIFIED FRACTURE OF SHAFT OF HUMERUS, UNSPECIFIED ARM, INITIAL ENCOUNTER FOR CLOSED FRACTURE: Chronic | ICD-10-CM

## 2018-07-05 PROCEDURE — 73502 X-RAY EXAM HIP UNI 2-3 VIEWS: CPT

## 2018-07-05 PROCEDURE — 72100 X-RAY EXAM L-S SPINE 2/3 VWS: CPT

## 2018-07-05 PROCEDURE — 99283 EMERGENCY DEPT VISIT LOW MDM: CPT

## 2018-07-05 PROCEDURE — 72100 X-RAY EXAM L-S SPINE 2/3 VWS: CPT | Mod: 26

## 2018-07-05 PROCEDURE — 99284 EMERGENCY DEPT VISIT MOD MDM: CPT | Mod: 25

## 2018-07-05 PROCEDURE — 73502 X-RAY EXAM HIP UNI 2-3 VIEWS: CPT | Mod: 26,RT

## 2018-07-05 RX ORDER — ACETAMINOPHEN 500 MG
975 TABLET ORAL ONCE
Qty: 0 | Refills: 0 | Status: COMPLETED | OUTPATIENT
Start: 2018-07-05 | End: 2018-07-05

## 2018-07-05 RX ORDER — CYCLOBENZAPRINE HYDROCHLORIDE 10 MG/1
10 TABLET, FILM COATED ORAL ONCE
Qty: 0 | Refills: 0 | Status: COMPLETED | OUTPATIENT
Start: 2018-07-05 | End: 2018-07-05

## 2018-07-05 RX ORDER — CYCLOBENZAPRINE HYDROCHLORIDE 10 MG/1
1 TABLET, FILM COATED ORAL
Qty: 12 | Refills: 0
Start: 2018-07-05

## 2018-07-05 RX ADMIN — CYCLOBENZAPRINE HYDROCHLORIDE 10 MILLIGRAM(S): 10 TABLET, FILM COATED ORAL at 16:10

## 2018-07-05 RX ADMIN — Medication 975 MILLIGRAM(S): at 16:10

## 2018-07-05 NOTE — ED PROVIDER NOTE - PHYSICAL EXAMINATION
CONSTITUTIONAL: WD,WN. NAD.    SKIN: Normal color and turgor. No rash.    HEAD: NC/AT.  EYES: Conjunctiva clear. EOMI. PERRL.    ENT: Airway patent, OP without erythema, tonsillar swelling or exudate; uvula midline without swelling. Nasal mucosa clear, no rhinorrhea.   RESPIRATORY:  Breathing non-labored. No retractions or accessory muscle use.  Lungs CTA bilat.  CARDIOVASCULAR:  RRR, S1S2. No M/R/G.      GI:  Abdomen soft, nontender.    MSK: Neck supple with painless ROM.  No lower extremity edema or calf tenderness.  + Pain in right hip with RLE ROM.  Strong DP and PT pulses.   NEURO: Alert and oriented; CN II-XII grossly intact. Speech clear. 5/5 strength in all extremities.  Normal balance and gait.

## 2018-07-05 NOTE — ED ADULT NURSE NOTE - OBJECTIVE STATEMENT
PT came to ED complaining of intermittent right hip pain for 11 years, worsening today.  Pt denies trauma or injury or history of trauma. Pt ambulate with even gait. Alert and oriented x3, No swelling, deformity at pain site. Positive PMS x4 extremities.

## 2018-07-05 NOTE — ED PROVIDER NOTE - NS ED ROS FT
CONSTITUTIONAL: No fever, chills, or weakness  NEURO: No headache, no dizziness, no syncope; No focal weakness/tingling/numbness  EYES: No visual changes  ENT: No rhinorrhea or sore throat  PULM: No cough or dyspnea  CV: No chest pain or palpitations  GI: No abdominal pain, vomiting, or diarrhea  : No dysuria, hematuria, frequency  MSK: HPI  SKIN: no rash or unusual bruising CONSTITUTIONAL: No fever, chills, or weakness  NEURO: No headache, no dizziness, no syncope; No focal weakness/tingling/numbness  EYES: No visual changes  ENT: No rhinorrhea or sore throat  PULM: No cough or dyspnea  CV: No chest pain or palpitations  GI: No abdominal pain, vomiting, or diarrhea; no fecal incontinence.  : No dysuria, hematuria, frequency; no urinary retention or incontinence  MSK: HPI  SKIN: no rash or unusual bruising

## 2018-07-05 NOTE — ED PROVIDER NOTE - OBJECTIVE STATEMENT
pt with hx of intermittent right sided hip pain for about 12 years.  says he gets the pain a couple of times per year, lasts sometimes months at a time.  has had current episode of this pain for about 4 weeks.  pain is sharp, located in lateral right hip, sometimes radiating to low back and down leg.  worse when putting weight on the right leg says "feels like bone on bone".  No bowel/bladder dysfunction.  Has hx of diabetic neuropathy causing bilateral foot numbness, which is at baseline.  No fever or chills, no focal weakness.  He occasionally takes Tylenol 500 mg without much relief.  Hx of CABG in Oct 2017 and on Plavix & aspirin, cannot take NSAIDs.  Says he has pain at chest incision site frequently, but the ischemic chest pains and dyspnea resolved after the CABG.    PMHx: DM, CAD, HTN, HLD, peripheral neuropathy  PSHx: CABG  Meds: metoprolol, Lipitor, aspirin, plavix  NKA  Soc: no tobacco or drug use. pt with hx of intermittent right sided hip pain for about 12 years.  says he gets the pain a couple of times per year, lasts sometimes months at a time.  has had current episode of this pain for about 4 weeks.  pain is sharp, located in lateral right hip, sometimes radiating to low back and down leg.  worse when putting weight on the right leg says "feels like bone on bone".  He also reports a history of recurrent lumbar pain.  No bowel/bladder dysfunction.  Has hx of diabetic neuropathy causing bilateral foot numbness, which is at baseline.  No fever or chills, no focal weakness.  He occasionally takes Tylenol 500 mg without much relief.  Hx of CABG in Oct 2017 and on Plavix & aspirin, cannot take NSAIDs.  Says he has pain at chest incision site frequently, but the ischemic chest pains and dyspnea resolved after the CABG.    PMHx: DM, CAD, HTN, HLD, peripheral neuropathy  PSHx: CABG  Meds: metoprolol, Lipitor, aspirin, plavix  NKA  Soc: no tobacco or drug use.

## 2018-07-05 NOTE — ED ADULT NURSE NOTE - CHPI ED SYMPTOMS NEG
no abrasion/no bruising/no difficulty bearing weight/no tingling/no weakness/no stiffness/no numbness/no back pain/no fever/no deformity

## 2018-07-05 NOTE — ED PROVIDER NOTE - MEDICAL DECISION MAKING DETAILS
OA of hip vs lumbar radiculopathy.  Neurologically intact.  Will get  XR,  treat pain with Tylenol & muscle relaxant.

## 2018-07-09 DIAGNOSIS — E11.9 TYPE 2 DIABETES MELLITUS WITHOUT COMPLICATIONS: ICD-10-CM

## 2018-07-09 DIAGNOSIS — Z79.82 LONG TERM (CURRENT) USE OF ASPIRIN: ICD-10-CM

## 2018-07-09 DIAGNOSIS — I10 ESSENTIAL (PRIMARY) HYPERTENSION: ICD-10-CM

## 2018-07-09 DIAGNOSIS — R20.0 ANESTHESIA OF SKIN: ICD-10-CM

## 2018-07-09 DIAGNOSIS — E78.5 HYPERLIPIDEMIA, UNSPECIFIED: ICD-10-CM

## 2018-07-09 DIAGNOSIS — M25.551 PAIN IN RIGHT HIP: ICD-10-CM

## 2018-07-09 DIAGNOSIS — Z79.891 LONG TERM (CURRENT) USE OF OPIATE ANALGESIC: ICD-10-CM

## 2018-07-09 DIAGNOSIS — I25.10 ATHEROSCLEROTIC HEART DISEASE OF NATIVE CORONARY ARTERY WITHOUT ANGINA PECTORIS: ICD-10-CM

## 2018-07-09 DIAGNOSIS — Z91.013 ALLERGY TO SEAFOOD: ICD-10-CM

## 2018-07-09 DIAGNOSIS — M19.90 UNSPECIFIED OSTEOARTHRITIS, UNSPECIFIED SITE: ICD-10-CM

## 2018-07-09 DIAGNOSIS — Z79.899 OTHER LONG TERM (CURRENT) DRUG THERAPY: ICD-10-CM

## 2018-07-09 DIAGNOSIS — Z79.84 LONG TERM (CURRENT) USE OF ORAL HYPOGLYCEMIC DRUGS: ICD-10-CM

## 2018-07-16 PROBLEM — Z86.718 PERSONAL HISTORY OF OTHER VENOUS THROMBOSIS AND EMBOLISM: Chronic | Status: ACTIVE | Noted: 2017-08-25

## 2018-07-16 PROBLEM — G47.30 SLEEP APNEA, UNSPECIFIED: Chronic | Status: ACTIVE | Noted: 2017-08-25

## 2019-06-28 NOTE — PROGRESS NOTE ADULT - PROBLEM SELECTOR PLAN 1
Asa, Statin, B-blocker, Chest PT,  Incentive spirometry, wound care and assessment.  Ambulate   Shower pod #5 [de-identified] : \par Motion\par \par Her right knee gives her no pain goes from 0-135° good medial lateral anterior posterior stability no Baker's cyst and no evidence of fusion.\par \par Her left knee is also moving well from 0-135° she has good mediolateral and anteroposterior stability no Baker's cyst no effusion but she does get some pain with compression of her patella and she does have some discomfort over the anterior aspect the lateral joint line she has some patellofemoral crepitus. [de-identified] : 4 views of the left  knee as well as an AP of the right knee shows that the left knee has joint space maintained as is her right knee the patella tracks well there is no evidence of any significant arthritis that could be seen by x-ray.  There is no significant change from her previous x-rays.

## 2019-07-18 ENCOUNTER — OUTPATIENT (OUTPATIENT)
Dept: OUTPATIENT SERVICES | Facility: HOSPITAL | Age: 54
LOS: 1 days | End: 2019-07-18
Payer: MEDICAID

## 2019-07-18 DIAGNOSIS — Z00.00 ENCOUNTER FOR GENERAL ADULT MEDICAL EXAMINATION WITHOUT ABNORMAL FINDINGS: ICD-10-CM

## 2019-07-18 DIAGNOSIS — S42.309A UNSPECIFIED FRACTURE OF SHAFT OF HUMERUS, UNSPECIFIED ARM, INITIAL ENCOUNTER FOR CLOSED FRACTURE: Chronic | ICD-10-CM

## 2019-07-18 DIAGNOSIS — Z95.5 PRESENCE OF CORONARY ANGIOPLASTY IMPLANT AND GRAFT: Chronic | ICD-10-CM

## 2019-07-18 PROBLEM — Z87.2 PERSONAL HISTORY OF DISEASES OF THE SKIN AND SUBCUTANEOUS TISSUE: Chronic | Status: ACTIVE | Noted: 2017-08-25

## 2019-07-18 PROBLEM — Z86.69 PERSONAL HISTORY OF OTHER DISEASES OF THE NERVOUS SYSTEM AND SENSE ORGANS: Chronic | Status: ACTIVE | Noted: 2017-08-25

## 2019-07-18 PROBLEM — I77.9 DISORDER OF ARTERIES AND ARTERIOLES, UNSPECIFIED: Chronic | Status: ACTIVE | Noted: 2017-08-25

## 2019-07-18 PROBLEM — I25.2 OLD MYOCARDIAL INFARCTION: Chronic | Status: ACTIVE | Noted: 2017-08-25

## 2019-07-18 PROCEDURE — 71250 CT THORAX DX C-: CPT

## 2019-07-18 PROCEDURE — 71250 CT THORAX DX C-: CPT | Mod: 26

## 2019-07-29 ENCOUNTER — OUTPATIENT (OUTPATIENT)
Dept: OUTPATIENT SERVICES | Facility: HOSPITAL | Age: 54
LOS: 1 days | End: 2019-07-29
Payer: MEDICARE

## 2019-07-29 ENCOUNTER — APPOINTMENT (OUTPATIENT)
Dept: CV DIAGNOSTICS | Facility: HOSPITAL | Age: 54
End: 2019-07-29

## 2019-07-29 DIAGNOSIS — I25.10 ATHEROSCLEROTIC HEART DISEASE OF NATIVE CORONARY ARTERY WITHOUT ANGINA PECTORIS: ICD-10-CM

## 2019-07-29 DIAGNOSIS — Z95.5 PRESENCE OF CORONARY ANGIOPLASTY IMPLANT AND GRAFT: Chronic | ICD-10-CM

## 2019-07-29 DIAGNOSIS — S42.309A UNSPECIFIED FRACTURE OF SHAFT OF HUMERUS, UNSPECIFIED ARM, INITIAL ENCOUNTER FOR CLOSED FRACTURE: Chronic | ICD-10-CM

## 2019-07-29 PROCEDURE — A9500: CPT

## 2019-07-29 PROCEDURE — 78452 HT MUSCLE IMAGE SPECT MULT: CPT

## 2019-07-29 PROCEDURE — 78452 HT MUSCLE IMAGE SPECT MULT: CPT | Mod: 26

## 2019-07-29 PROCEDURE — 93016 CV STRESS TEST SUPVJ ONLY: CPT

## 2019-07-29 PROCEDURE — 93017 CV STRESS TEST TRACING ONLY: CPT

## 2019-07-29 PROCEDURE — 93018 CV STRESS TEST I&R ONLY: CPT

## 2019-08-21 ENCOUNTER — OUTPATIENT (OUTPATIENT)
Dept: OUTPATIENT SERVICES | Facility: HOSPITAL | Age: 54
LOS: 1 days | End: 2019-08-21
Payer: MEDICARE

## 2019-08-21 VITALS
WEIGHT: 246.04 LBS | DIASTOLIC BLOOD PRESSURE: 84 MMHG | HEART RATE: 71 BPM | SYSTOLIC BLOOD PRESSURE: 171 MMHG | HEIGHT: 67 IN | OXYGEN SATURATION: 98 % | TEMPERATURE: 100 F | RESPIRATION RATE: 15 BRPM

## 2019-08-21 DIAGNOSIS — Z95.1 PRESENCE OF AORTOCORONARY BYPASS GRAFT: Chronic | ICD-10-CM

## 2019-08-21 DIAGNOSIS — R07.89 OTHER CHEST PAIN: ICD-10-CM

## 2019-08-21 DIAGNOSIS — S42.309A UNSPECIFIED FRACTURE OF SHAFT OF HUMERUS, UNSPECIFIED ARM, INITIAL ENCOUNTER FOR CLOSED FRACTURE: Chronic | ICD-10-CM

## 2019-08-21 DIAGNOSIS — I25.10 ATHEROSCLEROTIC HEART DISEASE OF NATIVE CORONARY ARTERY WITHOUT ANGINA PECTORIS: ICD-10-CM

## 2019-08-21 DIAGNOSIS — Z95.5 PRESENCE OF CORONARY ANGIOPLASTY IMPLANT AND GRAFT: Chronic | ICD-10-CM

## 2019-08-21 LAB
ALBUMIN SERPL ELPH-MCNC: 3.8 G/DL — SIGNIFICANT CHANGE UP (ref 3.3–5)
ALP SERPL-CCNC: 82 U/L — SIGNIFICANT CHANGE UP (ref 40–120)
ALT FLD-CCNC: 18 U/L — SIGNIFICANT CHANGE UP (ref 10–45)
ANION GAP SERPL CALC-SCNC: 12 MMOL/L — SIGNIFICANT CHANGE UP (ref 5–17)
AST SERPL-CCNC: 13 U/L — SIGNIFICANT CHANGE UP (ref 10–40)
BILIRUB SERPL-MCNC: 0.6 MG/DL — SIGNIFICANT CHANGE UP (ref 0.2–1.2)
BUN SERPL-MCNC: 13 MG/DL — SIGNIFICANT CHANGE UP (ref 7–23)
CALCIUM SERPL-MCNC: 9.4 MG/DL — SIGNIFICANT CHANGE UP (ref 8.4–10.5)
CHLORIDE SERPL-SCNC: 99 MMOL/L — SIGNIFICANT CHANGE UP (ref 96–108)
CO2 SERPL-SCNC: 24 MMOL/L — SIGNIFICANT CHANGE UP (ref 22–31)
CREAT SERPL-MCNC: 0.68 MG/DL — SIGNIFICANT CHANGE UP (ref 0.5–1.3)
GLUCOSE BLDC GLUCOMTR-MCNC: 144 MG/DL — HIGH (ref 70–99)
GLUCOSE SERPL-MCNC: 157 MG/DL — HIGH (ref 70–99)
HCT VFR BLD CALC: 47.9 % — SIGNIFICANT CHANGE UP (ref 39–50)
HGB BLD-MCNC: 14.6 G/DL — SIGNIFICANT CHANGE UP (ref 13–17)
MCHC RBC-ENTMCNC: 26.9 PG — LOW (ref 27–34)
MCHC RBC-ENTMCNC: 30.5 GM/DL — LOW (ref 32–36)
MCV RBC AUTO: 88.2 FL — SIGNIFICANT CHANGE UP (ref 80–100)
PLATELET # BLD AUTO: 294 K/UL — SIGNIFICANT CHANGE UP (ref 150–400)
POTASSIUM SERPL-MCNC: 3.9 MMOL/L — SIGNIFICANT CHANGE UP (ref 3.5–5.3)
POTASSIUM SERPL-SCNC: 3.9 MMOL/L — SIGNIFICANT CHANGE UP (ref 3.5–5.3)
PROT SERPL-MCNC: 7.2 G/DL — SIGNIFICANT CHANGE UP (ref 6–8.3)
RBC # BLD: 5.44 M/UL — SIGNIFICANT CHANGE UP (ref 4.2–5.8)
RBC # FLD: 12.5 % — SIGNIFICANT CHANGE UP (ref 10.3–14.5)
SODIUM SERPL-SCNC: 135 MMOL/L — SIGNIFICANT CHANGE UP (ref 135–145)
WBC # BLD: 12.6 K/UL — HIGH (ref 3.8–10.5)
WBC # FLD AUTO: 12.6 K/UL — HIGH (ref 3.8–10.5)

## 2019-08-21 PROCEDURE — 93010 ELECTROCARDIOGRAM REPORT: CPT

## 2019-08-21 PROCEDURE — 93459 L HRT ART/GRFT ANGIO: CPT | Mod: 26,GC

## 2019-08-21 PROCEDURE — 99152 MOD SED SAME PHYS/QHP 5/>YRS: CPT | Mod: GC

## 2019-08-21 NOTE — H&P CARDIOLOGY - HISTORY OF PRESENT ILLNESS
53 y/o  male with PMHx of Obesity, B/L Carotid stenosis, T2DM - A1C unknown, HTN, HLD, JASON not on CPAP and CAD s/p with prior PCI stents in 2012 and CABG x 4 in 10/2017 offering c/o left sided chest pain and exertional dyspnea (possible anginal equivalent) x 3 months.  Patient was evaluated by Cardiology - Dr. Coulter and had NST on 7/29/2019 with abnormal findings (report below).  Patient is now for [facility] for which he presents today.  Of note - after stress test antianginals prescribed (Imdur, Norvasc) patient chose not to take on his own accord.     IMPRESSIONS:Abnormal Study  * Chest Pain: Patient complained of 4/10 chest discomfort  starting at 1:00 min following regadenoson infusion at a  HR of 73 bpm and persisting until 3:00 min following  infusion.  * Symptom: Chest pain resolved spontaneously during  stress.  * HR Response: Appropriate.  * BP Response: Appropriate.  * Heart Rhythm: Sinus Rhythm - 65 BPM.  * Q Waves: III and aVF.  * Baseline ECG: No significant ST abnormalities.  * ECG Changes: No significant ischemic ST segment changes  beyond baseline abnormalities.  * Arrhythmia: None.  * Review of raw data shows: Minor motion artifact.  * The left ventricle was normal in size. There are large,  mild to moderate defects in the anterior, apical, basal  anteroseptal, and basal anterolateral walls that are  partially reversible suggestive of infarct with at least  moderate marilyn-infarct ischemia.  * There are large, moderate to severe defects in the  inferolateral, basal to mid inferior, and basal  inferoseptal walls that are partially reversible  suggestive of infarct with significant marilyn-infarct  ischemia.  * Post-stress gated wall motion analysis was performed  (LVEF = 54 %;LVEDV = 114 ml.) revealing hypokinesis of the  inferolateral, basal to mid inferior, and basal  inferoseptal walls and reduced systolic thickening of the  anterior, apical, basal anteroseptal, and basal  anterolateral walls.  *** Compared with the Nuclear/Stress test of 1/18/2018,  more ischemia and infarct are noted on the current exam.    Encounter time - 30 minutes

## 2019-08-21 NOTE — H&P CARDIOLOGY - PSH
Fracture of arm  1987, left radial fracture/operation/hardware  History of coronary artery stent placement  x3 (2012)  S/P CABG x 4

## 2020-01-20 PROCEDURE — 80053 COMPREHEN METABOLIC PANEL: CPT

## 2020-01-20 PROCEDURE — C1887: CPT

## 2020-01-20 PROCEDURE — 82962 GLUCOSE BLOOD TEST: CPT

## 2020-01-20 PROCEDURE — 85027 COMPLETE CBC AUTOMATED: CPT

## 2020-01-20 PROCEDURE — 99152 MOD SED SAME PHYS/QHP 5/>YRS: CPT

## 2020-01-20 PROCEDURE — C1769: CPT

## 2020-01-20 PROCEDURE — 93005 ELECTROCARDIOGRAM TRACING: CPT

## 2020-01-20 PROCEDURE — 93459 L HRT ART/GRFT ANGIO: CPT

## 2020-01-20 PROCEDURE — C1894: CPT

## 2021-01-14 NOTE — ED PROVIDER NOTE - PRINCIPAL DIAGNOSIS
Faye would like to discuss doing further testing for food allergies, she states she has not had any testing in a while.  She also would like to know if Dr Scott thinks she should get the Covid 19 vaccine in lieu of her severe allergies?  Please call her at   Telephone Information:   mobile [phone]        Arthritis of hip

## 2021-04-07 ENCOUNTER — APPOINTMENT (OUTPATIENT)
Dept: VASCULAR SURGERY | Facility: CLINIC | Age: 56
End: 2021-04-07
Payer: MEDICARE

## 2021-04-07 VITALS
DIASTOLIC BLOOD PRESSURE: 115 MMHG | WEIGHT: 250 LBS | SYSTOLIC BLOOD PRESSURE: 184 MMHG | HEIGHT: 67 IN | BODY MASS INDEX: 39.24 KG/M2

## 2021-04-07 DIAGNOSIS — M54.10 RADICULOPATHY, SITE UNSPECIFIED: ICD-10-CM

## 2021-04-07 PROCEDURE — 93880 EXTRACRANIAL BILAT STUDY: CPT

## 2021-04-07 PROCEDURE — 99204 OFFICE O/P NEW MOD 45 MIN: CPT

## 2021-04-12 NOTE — HISTORY OF PRESENT ILLNESS
[FreeTextEntry1] : 56 y/o M with PMH of obesity, HLD, HTN, CAD s/p  CABG x 3 in 2017 at [facility] presents to the office for initial evaluation of RLE pain and swelling. Pt referred by Dr. Susana Coulter. Patient c/o BLE discomfort particularly over the calves. Pt with difficulty staying active and walking due to hip, back and the leg pain. He is able to walk only a couple of blocks before the pain in any of the areas start and make him stop. Denies any rest pain, ulcerations, fever or chills. Sometimes the pain is bad when he is laying down at night. Also, patient reports gaining weight since the pandemia began. He is concern his symptoms are due to a blood clot. Denies any hx of bleeding or clotting disorders.\par \par Off note, after chart review, pt noted to have hx of carotid stenosis. He mentions having last scan a year ago. Denies any neurological symptoms\par

## 2021-04-12 NOTE — CONSULT LETTER
[Dear  ___] : Dear  [unfilled], [FreeTextEntry2] : Susana Coulter MD\par [address]\[place] [address]\par \par Ricky Lawrence MD\par [address]\[place] [address] [FreeTextEntry1] : Thank you for referring Mr Iain Owusu for evaluation. He presents with complaints of bilateral leg pain after walking only two blocks. He also reports chronic hip and back pain from arthritis. He denies ischemic rest pain, wounds or leg swelling.  He does reports weight gain over the last year. He tries to stay as active as possible. He also has a history of left carotid stenosis and says the last scan was one year ago. Denies any neurological symptoms or hx of TIA/CVA. \par \par On exam, no focal neurological deficits. Legs are warm to touch with palpable popliteal and pedal pulses bilaterally. Obese body habitus. No edema or skin breakdown.\par \par Carotid duplex demonstrated right internal carotid to have no stenosis. Left mild, less than 50% stenosis.    \par \par I believe that Mr Owusu's leg pains are not vascular in nature and likely musculoskeletal from lumbosacral spine arthritis.  They may be exacerbated by recent weight gain.  He has adequate arterial perfusion to the legs. I have advised him to start a walking program if his back allows and a weight management program. He may see me as needed.\par \par My complete EMR office note is below for your records.  [FreeTextEntry3] : Sincerely, \par \par Raphael Marks M.D. \par , [facility]\par , Department of Surgery [facility]\par Professor of Surgery, [employer] at [facility]

## 2021-04-12 NOTE — PHYSICAL EXAM
[2+] : left 2+ [Respiratory Effort] : normal respiratory effort [Normal Rate and Rhythm] : normal rate and rhythm [Varicose Veins Of Lower Extremities] : present [Ankle Swelling On The Right] : mild [Alert] : alert [Oriented to Person] : oriented to person [Oriented to Place] : oriented to place [Oriented to Time] : oriented to time [Calm] : calm [Ankle Swelling (On Exam)] : not present [] : not present [de-identified] : Well appearing,firendly [de-identified] : NC/AT  [de-identified] : Supple  [FreeTextEntry1] : BLE: scattered hyperpigmented scars over the BLEs, harvest saphenous graft incision nicely healed over the R medial calf. Feet are pink, toes are warm to touch with adequate capillary refill.  [de-identified] : overweight  [de-identified] : FROM [de-identified] : see cardiovasc section

## 2021-04-12 NOTE — PROCEDURE
[FreeTextEntry1] : B/L Carotid US: R ICA: no evidence of stenosis; L ICA: mild <50% stenosis. CCA distal <50% stenosis.

## 2021-04-12 NOTE — ADDENDUM
[FreeTextEntry1] : This note was written by Deb Collier on 04/07/2021 acting as scribe for Selina Paulson M.D.\par \par I, Dr. Raphael Marks  have read and attest that all the information, medical decision making and discharge instructions within are true and accurate.

## 2021-05-07 ENCOUNTER — OUTPATIENT (OUTPATIENT)
Dept: OUTPATIENT SERVICES | Facility: HOSPITAL | Age: 56
LOS: 1 days | End: 2021-05-07
Payer: MEDICARE

## 2021-05-07 DIAGNOSIS — R07.9 CHEST PAIN, UNSPECIFIED: ICD-10-CM

## 2021-05-07 DIAGNOSIS — S42.309A UNSPECIFIED FRACTURE OF SHAFT OF HUMERUS, UNSPECIFIED ARM, INITIAL ENCOUNTER FOR CLOSED FRACTURE: Chronic | ICD-10-CM

## 2021-05-07 DIAGNOSIS — Z95.1 PRESENCE OF AORTOCORONARY BYPASS GRAFT: Chronic | ICD-10-CM

## 2021-05-07 DIAGNOSIS — I25.9 CHRONIC ISCHEMIC HEART DISEASE, UNSPECIFIED: ICD-10-CM

## 2021-05-07 DIAGNOSIS — Z95.5 PRESENCE OF CORONARY ANGIOPLASTY IMPLANT AND GRAFT: Chronic | ICD-10-CM

## 2021-05-07 PROCEDURE — 78452 HT MUSCLE IMAGE SPECT MULT: CPT | Mod: 26,MH

## 2021-05-07 PROCEDURE — A9505: CPT

## 2021-05-07 PROCEDURE — 93016 CV STRESS TEST SUPVJ ONLY: CPT

## 2021-05-07 PROCEDURE — 93017 CV STRESS TEST TRACING ONLY: CPT

## 2021-05-07 PROCEDURE — 93018 CV STRESS TEST I&R ONLY: CPT

## 2021-05-07 PROCEDURE — 78452 HT MUSCLE IMAGE SPECT MULT: CPT

## 2021-05-07 PROCEDURE — 82962 GLUCOSE BLOOD TEST: CPT

## 2021-05-07 PROCEDURE — A9500: CPT

## 2021-06-08 ENCOUNTER — LABORATORY RESULT (OUTPATIENT)
Age: 56
End: 2021-06-08

## 2021-06-08 ENCOUNTER — APPOINTMENT (OUTPATIENT)
Dept: ENDOCRINOLOGY | Facility: CLINIC | Age: 56
End: 2021-06-08
Payer: MEDICARE

## 2021-06-08 VITALS
SYSTOLIC BLOOD PRESSURE: 151 MMHG | HEART RATE: 86 BPM | WEIGHT: 258 LBS | DIASTOLIC BLOOD PRESSURE: 75 MMHG | HEIGHT: 67 IN | BODY MASS INDEX: 40.49 KG/M2

## 2021-06-08 PROCEDURE — 99205 OFFICE O/P NEW HI 60 MIN: CPT | Mod: 25

## 2021-06-08 PROCEDURE — 82962 GLUCOSE BLOOD TEST: CPT

## 2021-06-11 NOTE — THERAPY
[Today's Date] : [unfilled] [Lantus] : Lantus [Novolog] : Novolog [FreeTextEntry9] : 25 u bid [de-identified] : 15 u ac

## 2021-06-11 NOTE — ADDENDUM
[FreeTextEntry1] : I, Josh Borges, acted solely as a scribe for Dr. Deshawn Ramirez on this date. 06/08/2021.

## 2021-06-11 NOTE — REASON FOR VISIT
[Initial Evaluation] : an initial evaluation [DM Type 2] : DM Type 2 [FreeTextEntry2] : Dr. Susana Coulter

## 2021-06-11 NOTE — END OF VISIT
[FreeTextEntry3] : All medical record entries made by the Scribe were at my, Dr. Deshawn Ramirez, direction and personally dictated by me on 06/08/2021. I have reviewed the chart and agree that the record accurately reflects my personal performance of the history, physical exam, assessment and plan. I have also personally directed, reviewed and agreed with the chart.  [Time Spent: ___ minutes] : I have spent [unfilled] minutes of time on the encounter.

## 2021-06-11 NOTE — HISTORY OF PRESENT ILLNESS
[FreeTextEntry1] : 55 year y/o male pt, with Hx of T2DM (dx in 2006) with no known DM related complications, referred by Dr. Susana Coulter, presents today to establish endocrine care with me \par Other PMHx: eye disease, MI(CAD), obesity\par Denies liver disease \par PSHx: bypass surgery, CABG(9/2017), \par Denies laser eye surgery. \par FHx: DM (father, grandparents), heart disease(grandmother)\par Denies FHx of obesity \par SHx: non-smoker/no ETOH use. Pt lives with mother. He is unemployed. Pt used to be a , he stopped 2018/2019 and does not work because he cannot exert himself. \par Lifestyle: Sedentary lifestyle. Eats 2 meals a day. Check FBS qd.  \par Last funduscopic visit: 5/2021\par Last Podiatrist visit: 6/7/21\par NKDA \par \par 06/08/2021\par Pt has Hx of MI(CAD), obesity(BMI 40.41) \par - Note from vascular team from 2014: Pt has Hx of stents on his three major coronary arteries \par - Note form cardiothoracic surgery 08/31/2017: Pt awaiting for CABG \par - Note form cardiothoracic surgery 01/2018: Status post CABGx5 in 09/29/2017 \par - Note from vascular surgeon from 4/27/2021: Hx of obesity, HTN, CABG at [facility], seen for RLE pain and swelling. BLE pain 2/2 arthritis. On exam, strong palpable pop/DP/PT pulses bilaterally.  \par \par I called Dr. Knowles and obtained the following information: 3/24/21 A1c 12%, s.creat 0.6, ALT 14, \par \par Today pt presents with POCT 427, /75, BMI 40.41, feeling well with c/o generalized pain as well as numbness with occasional stabbing pain in toes, and pain in calves. Pt saw podiatrist yesterday. \par Pt also c/o pain after taking  Metformin and so pt has not taken Metformin for the past 4 days. For the past month, pt has noticed that even after taking fast acting insulin injection, BS numbers stay the same. FBS readings are 200-300. \par Pt is not working, typically eats dinner around 7pm, falls sleeps at 12am-1am, and wakes up around 10am. Pt has trouble sleeping and says even if he goes to bed at 10pm, he won't fall asleep until 2am. Pt ate his first meal of the day today around 12:00pm and took 16u Novolog with the meal. For lunch today, pt ate a ham and cheese sandwich, boiled egg, and orange juice.  \par Pt saw psychiatrist as a kid for attitude problems. Pt has not seen a therapist. \par Denies nocturia, weight changes,  anxiety, depression. \par \par Current Medications: Lantus 34u QPM, Novolog 16u ac, Metformin 1 g bid, Januvia 100 mg qd, Plavix 75 mg qd,  mg, Lisinopril 20mg qd, Metoprolol 60 qd, Isosorbide 30 mg qd, Gabapentin 300 mg \par  \par Labs: \par - 3/24/21: A1c 12%, s.creat 0.6, ALT 14\par - 9/29/17: Pathology. Lymph node, mediastinal, excision. Unremarkable thymic tissue. \par - 9/13/17: s.creat 0.70, Ca 9.9\par - 8/31/17: s.creat 0.74, Ca 9.6, \par - 8/14/14: s.creat 0.72, Ca 9.4

## 2021-06-11 NOTE — PHYSICAL EXAM
[Normal Bowel Sounds] : normal bowel sounds [No Stigmata of Cushings Syndrome] : no stigmata of Cushings Syndrome [Normal Gait] : normal gait [Right Foot Was Examined] : right foot ~C was examined [Left Foot Was Examined] : left foot ~C was examined [Normal Reflexes] : deep tendon reflexes were 2+ and symmetric [Oriented x3] : oriented to person, place, and time [1+] : 1+ in the dorsalis pedis [Vibration Dec.] : diminished vibratory sensation at the level of the toes [Alert] : alert [Normal Sclera/Conjunctiva] : normal sclera/conjunctiva [Normal Outer Ear/Nose] : the ears and nose were normal in appearance [No Respiratory Distress] : no respiratory distress [Normal S1, S2] : normal S1 and S2 [Normal Rate] : heart rate was normal [de-identified] : Cervical fat pad  [de-identified] : Scar tissue in vein in LLE [de-identified] : Surgical scar on L arm and chest, b/l diabetic dermopathy  [de-identified] : Lipodystrophy in posterior L arm at the site of injection

## 2021-06-11 NOTE — ASSESSMENT
[Importance of Diet and Exercise] : importance of diet and exercise to improve glycemic control, achieve weight loss and improve cardiovascular health [Self Monitoring of Blood Glucose] : self monitoring of blood glucose [FreeTextEntry1] : 54 y/o M pt with: \par \par 1. T2DM (dx in 2006) \par Pt has multiple DM related complications including retinopathy, proteinuria, and AD \par Pt had CABGx5 on 09/29/2017. \par Pt used to be a . He currently lives with his mother\par No osmotic diuresis symptoms \par Pt c/o GI disturbances due to Metformin, lipodystrophy. \par Diabetes treatment goals explained. Pt states he is committed to getting his DM under control \par We are optimizing his MDI: Lantus 25 u bid, and Novolog 15 u ac \par For glucose readings below 100...No additional insulin\par Glucose between\par 101-150: 2u\par 151-200: 4u \par 201-250: 6u\par 251-300: 8u \par Above 300: 10u \par Pt will make appointment to see NP and RD for the end of this month. \par Order labs today.\par \par Return: End of July

## 2021-06-13 LAB — GLUCOSE BLDC GLUCOMTR-MCNC: 427

## 2021-06-15 ENCOUNTER — APPOINTMENT (OUTPATIENT)
Dept: ENDOCRINOLOGY | Facility: CLINIC | Age: 56
End: 2021-06-15
Payer: MEDICARE

## 2021-06-15 VITALS
BODY MASS INDEX: 41.19 KG/M2 | DIASTOLIC BLOOD PRESSURE: 88 MMHG | WEIGHT: 263 LBS | SYSTOLIC BLOOD PRESSURE: 153 MMHG | HEART RATE: 71 BPM

## 2021-06-15 DIAGNOSIS — E11.65 TYPE 2 DIABETES MELLITUS WITH HYPERGLYCEMIA: ICD-10-CM

## 2021-06-15 PROCEDURE — 99215 OFFICE O/P EST HI 40 MIN: CPT | Mod: 25

## 2021-06-15 PROCEDURE — 82962 GLUCOSE BLOOD TEST: CPT

## 2021-06-15 RX ORDER — INSULIN GLARGINE 100 [IU]/ML
100 INJECTION, SOLUTION SUBCUTANEOUS
Qty: 2 | Refills: 2 | Status: ACTIVE | COMMUNITY
Start: 2021-06-15 | End: 1900-01-01

## 2021-06-15 RX ORDER — INSULIN ASPART 100 [IU]/ML
100 INJECTION, SOLUTION INTRAVENOUS; SUBCUTANEOUS
Qty: 1 | Refills: 2 | Status: ACTIVE | COMMUNITY
Start: 2021-06-15 | End: 1900-01-01

## 2021-06-15 RX ORDER — ACARBOSE 50 MG/1
50 TABLET ORAL
Qty: 90 | Refills: 0 | Status: DISCONTINUED | COMMUNITY
Start: 2017-07-28 | End: 2021-06-15

## 2021-06-18 PROBLEM — E11.65 DIABETES TYPE 2, UNCONTROLLED: Status: ACTIVE | Noted: 2021-06-08

## 2021-06-18 LAB — GLUCOSE BLDC GLUCOMTR-MCNC: 138

## 2021-06-18 NOTE — PHYSICAL EXAM
[Alert] : alert [Normal Outer Ear/Nose] : the ears and nose were normal in appearance [Normal Sclera/Conjunctiva] : normal sclera/conjunctiva [No Respiratory Distress] : no respiratory distress [Normal S1, S2] : normal S1 and S2 [Normal Rate] : heart rate was normal [Normal Bowel Sounds] : normal bowel sounds [Normal Gait] : normal gait [No Stigmata of Cushings Syndrome] : no stigmata of Cushings Syndrome [Right Foot Was Examined] : right foot ~C was examined [Left Foot Was Examined] : left foot ~C was examined [1+] : 1+ in the dorsalis pedis [Normal Reflexes] : deep tendon reflexes were 2+ and symmetric [Vibration Dec.] : diminished vibratory sensation at the level of the toes [Oriented x3] : oriented to person, place, and time [de-identified] : Cervical fat pad  [de-identified] : Lipodystrophy in posterior L arm at the site of injection  [de-identified] : Scar tissue in vein in LLE [de-identified] : Surgical scar on L arm and chest, b/l diabetic dermopathy

## 2021-06-18 NOTE — END OF VISIT
[FreeTextEntry3] : All medical record entries made by the Scribe were at my, Dr. Deshawn Ramirez, direction and personally dictated by me on 06/15/2021. I have reviewed the chart and agree that the record accurately reflects my personal performance of the history, physical exam, assessment and plan. I have also personally directed, reviewed and agreed with the chart.  [Time Spent: ___ minutes] : I have spent [unfilled] minutes of time on the encounter.

## 2021-06-18 NOTE — HISTORY OF PRESENT ILLNESS
[FreeTextEntry1] : 55 year y/o male pt, with Hx of T2DM (dx in 2006) with no known DM related complications, presents today for endocrine f/u. \par Other PMHx: eye disease, MI(CAD), obesity\par Denies liver disease \par PSHx: bypass surgery, CABG(9/2017), \par Denies laser eye surgery. \par FHx: DM (father, grandparents), heart disease(grandmother)\par Denies FHx of obesity \par SHx: non-smoker/no ETOH use. Pt lives with mother. He is unemployed. Pt used to be a , he stopped 2018/2019 and does not work because he cannot exert himself. \par Lifestyle: Sedentary lifestyle. Eats 2 meals a day. Check FBS qd. \par Last funduscopic visit: 5/2021\par Last Podiatrist visit: 6/7/21\par NKDA \par \par 06/08/2021\par Pt has Hx of MI(CAD), obesity(BMI 40.41) \par - Note from vascular team from 2014: Pt has Hx of stents on his three major coronary arteries \par - Note form cardiothoracic surgery 08/31/2017: Pt awaiting for CABG \par - Note form cardiothoracic surgery 01/2018: Status post CABGx5 in 09/29/2017 \par - Note from vascular surgeon from 4/27/2021: Hx of obesity, HTN, CABG at [facility], seen for RLE pain and swelling. BLE pain 2/2 arthritis. On exam, strong palpable pop/DP/PT pulses bilaterally. \par \par I called Dr. Knowles and obtained the following information: 3/24/21 A1c 12%, s.creat 0.6, ALT 14, \par \par Today pt presents with POCT 427, /75, BMI 40.41, feeling well with c/o generalized pain as well as numbness with occasional stabbing pain in toes, and pain in calves. Pt saw podiatrist yesterday. \par Pt also c/o pain after taking Metformin and so pt has not taken Metformin for the past 4 days. For the past month, pt has noticed that even after taking fast acting insulin injection, BS numbers stay the same. FBS readings are 200-300. \par Pt is not working, typically eats dinner around 7pm, falls sleeps at 12am-1am, and wakes up around 10am. Pt has trouble sleeping and says even if he goes to bed at 10pm, he won't fall asleep until 2am. Pt ate his first meal of the day today around 12:00pm and took 16u Novolog with the meal. For lunch today, pt ate a ham and cheese sandwich, boiled egg, and orange juice. \par Pt saw psychiatrist as a kid for attitude problems. Pt has not seen a therapist. \par Denies nocturia, weight changes, anxiety, depression. \par \par 06/15/2021\par Today pt presents for DM f/u with POCT 138, /88, and BMI 41.19, feeling well with c/o frequent HA. After modifying his DM regimen last week, pt notes he has been seeing lower BS readings but he did not bring a record of BS readings. Pt reports FBS this morning was 137 and BS yesterday before dinner was 300. Pt has been consistently taking Lantus 30u and Novolog 16u even with high BS readings, and has not been using the sliding scale. Pt also reports he takes Novolog right after eating his meals. Pt is no longer on Metformin because it caused stomach pain and other GI disturbances. \par Pt used to be on Atorvastatin 80 mg, but has stopped taking it because the medication caused leg cramps. Pt has instead been taking Repatha. Pt was prescribed Repatha last year and advised to take it every 2 weeks. Pt was previously having issues obtaining the medication through his insurance but has recently received it. Pt had Repatha injection a few days ago. \par Pt is also worried about his BP as recent BP readings at home were 100/64 and 115/70 and his heart rate was around 70-78.  \par Pt notes that when he was working as a  he would often eat at fast food restaurants but is now trying to reduce fat consumption. Pt has not seen a nutritionist or exercise . \par Pt notes he will be traveling later this year. \par \par Current Medications: Lantus 30u BID, Novolog 15u ac, Metformin 1 g bid (discontinued 06/2021), Januvia 100 mg qd, Plavix 75 mg qd,  mg, Lisinopril 20mg qd (discontinued 6/2021), Metoprolol 60 qd, Isosorbide 30 mg qd, Gabapentin 300 mg, Repatha \par  \par Labs: \par - 6/8/21: A1c 11.2%, s.creat 0.76, Ca 10.0, LDL-c 137, cholesterol 223, , non-HDL cholesterol 180, TSH 0.81, \par - 3/24/21: A1c 12%, s.creat 0.6, ALT 14\par - 9/29/17: Pathology. Lymph node, mediastinal, excision. Unremarkable thymic tissue. \par - 9/13/17: s.creat 0.70, Ca 9.9\par - 8/31/17: s.creat 0.74, Ca 9.6, \par - 8/14/14: s.creat 0.72, Ca 9.4

## 2021-06-18 NOTE — ADDENDUM
[FreeTextEntry1] : I, Josh Borges, acted solely as a scribe for Dr. Deshawn Ramirez on this date. 06/15/2021.

## 2021-06-18 NOTE — ASSESSMENT
[Importance of Diet and Exercise] : importance of diet and exercise to improve glycemic control, achieve weight loss and improve cardiovascular health [Self Monitoring of Blood Glucose] : self monitoring of blood glucose [Carbohydrate Consistent Diet] : carbohydrate consistent diet [FreeTextEntry1] : 56 y/o M pt with: \par \par 1. T2DM (dx in 2006), poorly controlled \par Pt has multiple DM related complications including retinopathy, proteinuria, and CAD-CABG in 2017\par Restarted Repatha and was taking insulin injections after meals. \par He is currently being studied to reassess his CAD status. \par Diabetes treatment goals explained including target LDL-c.\par MDI Lantus 25 BID and novolog using ss up to 25 u ac meal\par Add Atorvastatin 10 mg 5 days a week. \par Continue MDI. \par Recommend appointment with nutritionist and exercise . \par \par Return: 4 months  [Action and use of Insulin] : action and use of short and long-acting insulin [Injection Technique, Storage, Sharps Disposal] : injection technique, storage, and sharps disposal

## 2021-07-09 ENCOUNTER — APPOINTMENT (OUTPATIENT)
Dept: ENDOCRINOLOGY | Facility: CLINIC | Age: 56
End: 2021-07-09

## 2021-07-13 ENCOUNTER — APPOINTMENT (OUTPATIENT)
Dept: ENDOCRINOLOGY | Facility: CLINIC | Age: 56
End: 2021-07-13

## 2021-09-23 NOTE — PHYSICAL THERAPY INITIAL EVALUATION ADULT - SITTING BALANCE: DYNAMIC
Detail Level: Detailed Quality 111:Pneumonia Vaccination Status For Older Adults: Pneumococcal Vaccination Previously Received Quality 130: Documentation Of Current Medications In The Medical Record: Current Medications Documented good minus

## 2022-01-10 NOTE — H&P PST ADULT - GASTROINTESTINAL
PHYSICAL MEDICINE AND REHABILITATION  PROGRESS NOTE      ADMISSION DATE:  1/8/2022  DATE:  1/10/2022  CURRENT HOSPITAL DAY:  Hospital Day: 3  ATTENDING PHYSICIAN:  Maxwell Ryan MD      SALAZAR Shah is a 64 year old female patient admitted with intraventricular hemorrhage, right temporal occipital parenchymal hematoma, right interhemispheric falx acute subdural hemorrhage.    CC: No weakness    Remote rounding was implemented due to Heidy 19 pandemic and request for single provider entry in patient rooms to reduce traffic, minimize PPE, and/or reduce exposure to staff.    HPI: Chart was reviewed.  The patient remains in N CCU.  She remains intubated.  Possible neurosurgical intervention is pending.      Functional Status:     Therapy evaluations are pending.      MEDICATIONS:     The medication list was reviewed today.   Current Facility-Administered Medications   Medication Dose Route Frequency Provider Last Rate Last Admin   • sodium chloride 0.9% infusion   Intravenous Continuous PRN Gerardo Corina V, CNP       • metoCLOPramide (REGLAN) injection 5 mg  5 mg Intravenous Once Raudel Leger, GOPAL       • NORepinephrine (LEVOPHED) 8 mg/250 mL in sodium chloride 0.9 % infusion  0-199 mcg/min Intravenous Continuous Gerardo Corina V, CNP       • chlorhexidine gluconate (PERIDEX) 0.12 % solution 15 mL  15 mL Swish & Spit 2 times per day Gerardo Corina V, CNP   15 mL at 01/10/22 0908    And   • chlorhexidine gluconate (PERIDEX) 0.12 % solution 15 mL  15 mL Swish & Spit PRN Gerardo Corina V, CNP       • famotidine (PEPCID) injection 20 mg  20 mg Intravenous Daily Gerardo Corina V, CNP   20 mg at 01/09/22 0330   • petrolatum (white)-mineral oil (LUBRIFRESH PM) ophthalmic ointment 1 application  1 application Both Eyes 6 times per day Gerardo Corina V, CNP   1 application at 01/10/22 1131   • propofol (DIPRIVAN) infusion  0-50 mcg/kg/min (Order-Specific) Intravenous Continuous Gerardo Corina V, CNP  19.9 mL/hr at 01/10/22 1300 40 mcg/kg/min at 01/10/22 1300   • folic acid (FOLATE) tablet 1 mg  1 mg Oral Daily Meaghan Knott CNP   1 mg at 01/09/22 2110   • thiamine (VITAMIN B1) tablet 100 mg  100 mg Oral Daily Meaghan Knott CNP   100 mg at 01/09/22 2109   • fentaNYL (SUBLIMAZE) injection 50 mcg  50 mcg Intravenous Q1H PRN Meaghan Knott CNP   50 mcg at 01/10/22 0556   • gabapentin (NEURONTIN) 250 MG/5ML solution 300 mg  300 mg Per NG tube 3 times per day Meaghan Knott CNP   300 mg at 01/10/22 0639   • [START ON 1/12/2022] gabapentin (NEURONTIN) 250 MG/5ML solution 300 mg  300 mg Per NG tube 2 times per day Meaghan Knott CNP       • [START ON 1/16/2022] gabapentin (NEURONTIN) 250 MG/5ML solution 300 mg  300 mg Per NG tube Daily Meaghan Knott CNP       • acetaminophen (TYLENOL) tablet 650 mg  650 mg Oral Q4H PRN Gerardo HATFIELD CNP        Or   • acetaminophen (TYLENOL) suppository 650 mg  650 mg Rectal Q4H PRN Gerardo Arriaga V CNP       • sodium chloride 0.9 % flush bag 25 mL  25 mL Intravenous PRN Gerardo HATFIELD CNP       • sodium chloride (PF) 0.9 % injection 2 mL  2 mL Intracatheter 2 times per day Gerardo Arriaga V CNP   2 mL at 01/10/22 0745   • dextrose 50 % injection 25 g  25 g Intravenous PRN Gerardo Arriaga V CNP       • dextrose 50 % injection 12.5 g  12.5 g Intravenous PRN Gerardo Arriaga V, CNP       • glucagon (GLUCAGEN) injection 1 mg  1 mg Intramuscular PRN Gerardo Arriaga V CNP       • dextrose (GLUTOSE) 40 % gel 15 g  15 g Oral PRN Gerardo Arriaga V, CNP       • dextrose (GLUTOSE) 40 % gel 30 g  30 g Oral PRN Gerardo Arriaga V, CNP       • niCARdipine (CARDENE) 40 mg/200 mL in NaCl infusion  0-15 mg/hr Intravenous Continuous Gerardo Arriaga V CNP 20 mL/hr at 01/10/22 1300 4 mg/hr at 01/10/22 1300   • atorvastatin (LIPITOR) tablet 80 mg  80 mg Oral Nightly Gerardo HATFIELD CNP   80 mg at 01/09/22 2109   • ondansetron (ZOFRAN ODT) disintegrating tablet 4 mg  4  mg Oral Q12H PRN Gerardo HATFIELD CNP        Or   • ondansetron (ZOFRAN) injection 4 mg  4 mg Intravenous Q12H PRN Gerardo HATFIELD CNP       • docusate sodium (COLACE) capsule 200 mg  200 mg Oral Daily Gerardo HATFIELD CNP       • polyethylene glycol (MIRALAX) packet 17 g  17 g Oral Daily PRN Gerardo HATFIELD CNP       • Phosphorus Standard Replacement Protocol   Does not apply See Admin Instructions Gerardo HATFIELD CNP       • Magnesium Standard Replacement Protocol   Does not apply See Admin Instructions Gerardo HATFIELD CNP       • Potassium Standard Replacement Protocol   Does not apply See Admin Instructions Gerardo HATFIELD CNP       • insulin regular (human) (HumuLIN R, NovoLIN R) Correction Dose   Subcutaneous 4 times per day Gerardo HATFIELD CNP   2 Units at 01/09/22 0800          Review of Systems   Unable to perform ROS: Intubated          OBJECTIVE  VITAL SIGNS:     Vital Last Value 24 Hour Range   Temperature 97.3 °F (36.3 °C) (01/10/22 1200) Temp  Min: 97.3 °F (36.3 °C)  Max: 99.1 °F (37.3 °C)   Pulse 100 (01/10/22 1300) Pulse  Min: 86  Max: 126   Respiratory (!) 24 (01/10/22 1300) Resp  Min: 17  Max: 27   Non-Invasive  Blood Pressure 134/54 (01/09/22 1400) No data recorded   Pulse Oximetry 98 % (01/10/22 1300) SpO2  Min: 97 %  Max: 100 %     Vital Today Admitted   Weight 83.1 kg (183 lb 3.2 oz) (01/08/22 2330) Weight: 83.1 kg (183 lb 3.2 oz) (01/08/22 2330)       INTAKE/OUTPUT:      Intake/Output Summary (Last 24 hours) at 1/10/2022 1400  Last data filed at 1/10/2022 1300  Gross per 24 hour   Intake 968.71 ml   Output 1200 ml   Net -231.29 ml       Bowel: Stool Amount: Large (01/09/22 0659)  Stool Occurrence: 1 (01/09/22 0659)     PVR:      PHYSICAL EXAMINATION:  Physical Exam  Vitals and nursing note reviewed.   Constitutional:       Comments: Intubated   HENT:      Head: Normocephalic.   Cardiovascular:      Rate and Rhythm: Normal rate and regular rhythm.   Pulmonary:      Effort:  Pulmonary effort is normal.   Abdominal:      General: Bowel sounds are normal.      Palpations: Abdomen is soft.   Musculoskeletal:         General: No swelling.      Cervical back: Neck supple.      Comments: Bilateral wrist restraints are intact    Skin:     General: Skin is warm.   Neurological:      Motor: Weakness present.   Psychiatric:      Comments:   Sedated           LABORATORY DATA:    Recent Results (from the past 24 hour(s))   GLUCOSE, BEDSIDE - POINT OF CARE    Collection Time: 01/09/22  5:14 PM   Result Value Ref Range    GLUCOSE, BEDSIDE - POINT OF CARE 144 (H) 70 - 99 mg/dL   Electrocardiogram 12-Lead    Collection Time: 01/09/22  6:44 PM   Result Value Ref Range    Ventricular Rate EKG/Min (BPM) 122     Atrial Rate (BPM) 141     TX-Interval (MSEC) 148     QRS-Interval (MSEC) 146     QT-Interval (MSEC) 330     QTc 470     P Axis (Degrees) 36     R Axis (Degrees) -35     T Axis (Degrees) 118     REPORT TEXT       Sinus tachycardia  with  fusion complexes  Left axis deviation  Left bundle branch block  Abnormal ECG  No previous ECGs available  Confirmed by CRAIG CARRANZA MD ([id number]) on 1/10/2022 7:57:57 AM     GLUCOSE, BEDSIDE - POINT OF CARE    Collection Time: 01/09/22 11:06 PM   Result Value Ref Range    GLUCOSE, BEDSIDE - POINT OF CARE 130 (H) 70 - 99 mg/dL   Comprehensive Metabolic Panel    Collection Time: 01/10/22  3:47 AM   Result Value Ref Range    Fasting Status      Sodium 144 135 - 145 mmol/L    Potassium 3.6 3.4 - 5.1 mmol/L    Chloride 111 (H) 98 - 107 mmol/L    Carbon Dioxide 29 21 - 32 mmol/L    Anion Gap 8 (L) 10 - 20 mmol/L    Glucose 140 (H) 70 - 99 mg/dL    BUN 11 6 - 20 mg/dL    Creatinine 0.61 0.51 - 0.95 mg/dL    Glomerular Filtration Rate >90 >=60    BUN/ Creatinine Ratio 18 7 - 25    Calcium 8.7 8.4 - 10.2 mg/dL    Bilirubin, Total 0.5 0.2 - 1.0 mg/dL    GOT/AST 17 <=37 Units/L    GPT/ALT 19 <64 Units/L    Alkaline Phosphatase 104 45 - 117 Units/L    Albumin 3.2 (L) 3.6 - 5.1  g/dL    Protein, Total 6.5 6.4 - 8.2 g/dL    Globulin 3.3 2.0 - 4.0 g/dL    A/G Ratio 1.0 1.0 - 2.4   Magnesium    Collection Time: 01/10/22  3:47 AM   Result Value Ref Range    Magnesium 2.1 1.7 - 2.4 mg/dL   Phosphorus    Collection Time: 01/10/22  3:47 AM   Result Value Ref Range    Phosphorus 4.0 2.4 - 4.7 mg/dL   CBC No Differential    Collection Time: 01/10/22  3:47 AM   Result Value Ref Range    WBC 12.4 (H) 4.2 - 11.0 K/mcL    RBC 3.44 (L) 4.00 - 5.20 mil/mcL    HGB 11.0 (L) 12.0 - 15.5 g/dL    HCT 33.5 (L) 36.0 - 46.5 %    MCV 97.4 78.0 - 100.0 fl    MCH 32.0 26.0 - 34.0 pg    MCHC 32.8 32.0 - 36.5 g/dL     140 - 450 K/mcL    RDW-CV 13.3 11.0 - 15.0 %    RDW-SD 47.5 39.0 - 50.0 fL    NRBC 0 <=0 /100 WBC   GLUCOSE, BEDSIDE - POINT OF CARE    Collection Time: 01/10/22  5:36 AM   Result Value Ref Range    GLUCOSE, BEDSIDE - POINT OF CARE 133 (H) 70 - 99 mg/dL   GLUCOSE, BEDSIDE - POINT OF CARE    Collection Time: 01/10/22 11:23 AM   Result Value Ref Range    GLUCOSE, BEDSIDE - POINT OF CARE 158 (H) 70 - 99 mg/dL         IMAGING STUDIES:   XR CHEST PA OR AP 1 VIEW   Final Result   FINDINGS/IMPRESSION:      Since the examination of the previous day, there has been the insertion of   a feeding tube, the distal end of which is identified in the projection of   the expected location of the stomach.      There are residual bilateral (left side more extensive than right) basilar   infiltrative/atelectatic changes, which appear unchanged.      There is an endotracheal tube.  There are mid-sternal sutures and   mediastinal clips from a previous aortocoronary bypass.  There are   degenerative changes of the thoracic spine.  There is borderline   cardiomegaly.  There is calcification of the thoracic aorta      There is no evidence of cardiac decompensation or pneumothorax.      Electronically Signed by: KELSI TOUSSAINT MD    Signed on: 1/10/2022 11:01 AM          CT HEAD WO CONTRAST   Final Result   Addendum 1 of 1    ADDENDUM:   01/10/22 05:54 Call Doctor Regarding Other, called Rasheed SYLVIEMARIA E  on    01/10 05:54 (-06:00)            Final            Communications:       Call Doctor Other      Electronically signed by Zack Morales MD on 01 10 22 at 05:25      XR TUBE CHECK ABDOMEN KUB   Final Result   FINDINGS/IMPRESSION:      Dobbhoff tube projects under left hemidiaphragm, likely within gastric   fundus.      Bowel gas pattern is grossly nonobstructive.      Electronically Signed by: CABRERA DAVIS MD    Signed on: 1/9/2022 7:15 PM          CT HEAD WO CONTRAST   Final Result      1.   Increased size of the intraparenchymal hematoma in the posterior right   temporal lobe with increased hemorrhage into the right lateral ventricle.   Additional hemorrhage in the ventricular system has redistributed. Stable   ventricular size. No midline shift.   2.   Moderately increased bilateral subarachnoid hemorrhages along the   cerebral hemispheres.   3.   Decreased right parafalcine subdural hematoma tracking along the right   tentorial leaflet.      Electronically Signed by: PAUL BARRON MD    Signed on: 1/9/2022 12:55 PM          XR CHEST AP OR PA 1 VIEW   Final Result   1.  The endotracheal tube (ETT) is in satisfactory position with tip 3.5    cm above the beata.   2.  Mild bibasilar atelectasis.            Electronically signed by Kushal Post M.D. on 01 09 22 at 05:10      CT HEAD DUAL ENERGY WO CONTRAST   Final Result   Addendum 2 of 2   ADDENDUM:   Patient: ROSEMARIE DICKSON  Time Out: 02:00   Exam(s): CT HEAD Without Contrast       Electronically signed by Brianna Abadie, M.D. on 1/9/2022 2:00 AM    Addendum: Mild increase in size of the lateral ventricles, particularly    the occipital horns compared to the previous exam, measuring    approximately 2.0 cm in width on the right (compared to 14.6 cm    previously), and 1.9 cm on the left (measuring 1.4 cm previously).            Final   1.3.3 x 1.7 x 2.2 cm (AP, TR, CC)  parenchymal hematoma in the medial    right temporo-occipital lobe distribution with mild surrounding vasogenic    edema and effacement of the adjacent cerebral sulci.     2.  Acute intraventricular hemorrhage within the body, occipital and    temporal horns of the lateral ventricles as well as the third and fourth    ventricles.   3. Acute subdural hemorrhage visualized along the right aspect of the    interhemispheric falx and along the right cerebellar tentorium.                Communications:       Call Doctor Intracranial Hemorrhage      Electronically signed by Brianna Abadie, M.D. on 01 09 22 at 01:41            ASSESSMENT        1.  Intraventricular hemorrhage.  2.  Right temporal occipital parenchymal hematoma.  3.  Right interhemispheric falx acute subdural hemorrhage.  4.  Ischemic stroke.  5.  Left M1 occlusion, status post TPA.  6.  Right ICA occlusion.  7.  Acute hypoxic respiratory failure, status post intubation.  8.  COVID-19 infection.  9.  Hypertrophic cardiomyopathy.  10.  Chronic diastolic heart failure.  11.  Atrial fibrillation.  12.  Hyperlipidemia.  13.  COPD.  14.  Leukocytosis.        Plan/ Rehabilitation Recommendations     Complete evaluations by PT, OT and ST.     Monitor the patient's medical and functional status for appropriate level of continued therapies.     Await possible neurosurgical intervention.  The patient may need placement in an outside facility due to Covid status.       A definitive recommendation will be given once the above is noted.        Rehabilitation Pre-Admission Screen     IMPAIRMENT GROUP CODE: 2.1     RECOMMENDED LEVEL OF CARE:  This patient may require acute inpatient rehabilitation and may benefit from intensive therapy.  The patient may be able to actively participate in the program.     PRIOR LEVEL OF FUNCTION: TBD     EXPECTED LEVEL OF IMPROVEMENT: TBD     ESTIMATED LENGTH OF STAY: TBD     RISK OF CLINICAL COMPLICATIONS: The patient is at increased risk  for respiratory failure, aspiration pneumonia, falls, fatigue, infection, alteration in skin integrity, pain, DVT, PE, dehydration, electrolyte imbalance, poor nutrition, altered sleep pattern, alteration in bowel/bladder control, and cardiopulmonary events. The patient's cardiopulmonary response to exercise and activity will be monitored by nursing and therapy staff with regular checks of blood pressure, pulse rate, and oxygen saturations.  Given the above listed co-morbidities and risks for complications, the patient would benefit from 24-hour availability of the rehabilitation team for closer medical monitoring and coordination of care with other specialists.  To minimize these risks for complications, the patient will receive close medical monitoring provided by the physiatrist who will also coordinate care with multiple disciplines.     TREATMENTS NEEDED: The patient will need at least 3 hours/day, 5 days/week consisting of:  - Occupational Therapy to address endurance, activity tolerance, strength, range of motion, coordination, balance, safe transfers, and self-care.   - Physical Therapy to address endurance, strength, range of motion, safe ambulation/stair management with appropriate assistive device.  - Speech Therapy for swallow evaluation and cognitive evaluation and treatment.  - Weekly team conferences to coordinate plan of care.     ANTICIPATED DISCHARGE DESTINATION: TBD     ANTICIPATED POST-DISCHARGE TREATMENTS: TBD      Our rehab team will continue to follow the patient.        A definitive recommendation will be given once the above is noted.       TIME SPENT:    I spent 25 minutes on this case with counseling and coordination care >50% including discharge plans discussed with patient, , RN/Physical therapy/Occupational therapy      Luis Troy MD  1/10/2022 2:00 PM   details… detailed exam

## 2022-11-08 ENCOUNTER — NON-APPOINTMENT (OUTPATIENT)
Age: 57
End: 2022-11-08

## 2022-11-09 LAB — SARS-COV-2 N GENE NPH QL NAA+PROBE: NOT DETECTED

## 2022-11-10 ENCOUNTER — TRANSCRIPTION ENCOUNTER (OUTPATIENT)
Age: 57
End: 2022-11-10

## 2022-11-10 ENCOUNTER — OUTPATIENT (OUTPATIENT)
Dept: OUTPATIENT SERVICES | Facility: HOSPITAL | Age: 57
LOS: 1 days | End: 2022-11-10
Payer: COMMERCIAL

## 2022-11-10 VITALS — RESPIRATION RATE: 14 BRPM | TEMPERATURE: 98 F | HEIGHT: 67 IN | WEIGHT: 250 LBS | OXYGEN SATURATION: 95 %

## 2022-11-10 VITALS
OXYGEN SATURATION: 97 % | DIASTOLIC BLOOD PRESSURE: 81 MMHG | SYSTOLIC BLOOD PRESSURE: 168 MMHG | HEART RATE: 65 BPM | RESPIRATION RATE: 16 BRPM

## 2022-11-10 DIAGNOSIS — Z95.1 PRESENCE OF AORTOCORONARY BYPASS GRAFT: Chronic | ICD-10-CM

## 2022-11-10 DIAGNOSIS — S42.309A UNSPECIFIED FRACTURE OF SHAFT OF HUMERUS, UNSPECIFIED ARM, INITIAL ENCOUNTER FOR CLOSED FRACTURE: Chronic | ICD-10-CM

## 2022-11-10 DIAGNOSIS — Z95.5 PRESENCE OF CORONARY ANGIOPLASTY IMPLANT AND GRAFT: Chronic | ICD-10-CM

## 2022-11-10 DIAGNOSIS — R07.89 OTHER CHEST PAIN: ICD-10-CM

## 2022-11-10 LAB
ANION GAP SERPL CALC-SCNC: 12 MMOL/L — SIGNIFICANT CHANGE UP (ref 5–17)
BUN SERPL-MCNC: 11 MG/DL — SIGNIFICANT CHANGE UP (ref 7–23)
CALCIUM SERPL-MCNC: 9.2 MG/DL — SIGNIFICANT CHANGE UP (ref 8.4–10.5)
CHLORIDE SERPL-SCNC: 99 MMOL/L — SIGNIFICANT CHANGE UP (ref 96–108)
CO2 SERPL-SCNC: 26 MMOL/L — SIGNIFICANT CHANGE UP (ref 22–31)
CREAT SERPL-MCNC: 0.67 MG/DL — SIGNIFICANT CHANGE UP (ref 0.5–1.3)
EGFR: 110 ML/MIN/1.73M2 — SIGNIFICANT CHANGE UP
GLUCOSE BLDC GLUCOMTR-MCNC: 132 MG/DL — HIGH (ref 70–99)
GLUCOSE SERPL-MCNC: 103 MG/DL — HIGH (ref 70–99)
HCT VFR BLD CALC: 42.4 % — SIGNIFICANT CHANGE UP (ref 39–50)
HGB BLD-MCNC: 14 G/DL — SIGNIFICANT CHANGE UP (ref 13–17)
MCHC RBC-ENTMCNC: 28.5 PG — SIGNIFICANT CHANGE UP (ref 27–34)
MCHC RBC-ENTMCNC: 33 GM/DL — SIGNIFICANT CHANGE UP (ref 32–36)
MCV RBC AUTO: 86.2 FL — SIGNIFICANT CHANGE UP (ref 80–100)
NRBC # BLD: 0 /100 WBCS — SIGNIFICANT CHANGE UP (ref 0–0)
PLATELET # BLD AUTO: 338 K/UL — SIGNIFICANT CHANGE UP (ref 150–400)
POTASSIUM SERPL-MCNC: 3.9 MMOL/L — SIGNIFICANT CHANGE UP (ref 3.5–5.3)
POTASSIUM SERPL-SCNC: 3.9 MMOL/L — SIGNIFICANT CHANGE UP (ref 3.5–5.3)
RBC # BLD: 4.92 M/UL — SIGNIFICANT CHANGE UP (ref 4.2–5.8)
RBC # FLD: 13 % — SIGNIFICANT CHANGE UP (ref 10.3–14.5)
SODIUM SERPL-SCNC: 137 MMOL/L — SIGNIFICANT CHANGE UP (ref 135–145)
WBC # BLD: 10.82 K/UL — HIGH (ref 3.8–10.5)
WBC # FLD AUTO: 10.82 K/UL — HIGH (ref 3.8–10.5)

## 2022-11-10 PROCEDURE — C1894: CPT

## 2022-11-10 PROCEDURE — 93010 ELECTROCARDIOGRAM REPORT: CPT

## 2022-11-10 PROCEDURE — 93459 L HRT ART/GRFT ANGIO: CPT | Mod: 26

## 2022-11-10 PROCEDURE — 85027 COMPLETE CBC AUTOMATED: CPT

## 2022-11-10 PROCEDURE — 82962 GLUCOSE BLOOD TEST: CPT

## 2022-11-10 PROCEDURE — C1887: CPT

## 2022-11-10 PROCEDURE — 93459 L HRT ART/GRFT ANGIO: CPT

## 2022-11-10 PROCEDURE — 80048 BASIC METABOLIC PNL TOTAL CA: CPT

## 2022-11-10 PROCEDURE — 93005 ELECTROCARDIOGRAM TRACING: CPT

## 2022-11-10 PROCEDURE — C1769: CPT

## 2022-11-10 PROCEDURE — 99152 MOD SED SAME PHYS/QHP 5/>YRS: CPT

## 2022-11-10 RX ORDER — SODIUM CHLORIDE 9 MG/ML
3 INJECTION INTRAMUSCULAR; INTRAVENOUS; SUBCUTANEOUS EVERY 8 HOURS
Refills: 0 | Status: DISCONTINUED | OUTPATIENT
Start: 2022-11-10 | End: 2022-11-24

## 2022-11-10 RX ORDER — LISINOPRIL 2.5 MG/1
1 TABLET ORAL
Qty: 0 | Refills: 0 | DISCHARGE

## 2022-11-10 RX ORDER — METFORMIN HYDROCHLORIDE 850 MG/1
1 TABLET ORAL
Qty: 0 | Refills: 0 | DISCHARGE

## 2022-11-10 RX ORDER — SODIUM CHLORIDE 9 MG/ML
250 INJECTION INTRAMUSCULAR; INTRAVENOUS; SUBCUTANEOUS ONCE
Refills: 0 | Status: COMPLETED | OUTPATIENT
Start: 2022-11-10 | End: 2022-11-10

## 2022-11-10 RX ORDER — ACARBOSE 25 MG/1
1 TABLET ORAL
Qty: 0 | Refills: 0 | DISCHARGE

## 2022-11-10 RX ORDER — NITROGLYCERIN 6.5 MG
1 CAPSULE, EXTENDED RELEASE ORAL
Qty: 0 | Refills: 0 | DISCHARGE

## 2022-11-10 RX ORDER — CLOPIDOGREL BISULFATE 75 MG/1
1 TABLET, FILM COATED ORAL
Qty: 0 | Refills: 0 | DISCHARGE
Start: 2022-11-10

## 2022-11-10 RX ORDER — SODIUM CHLORIDE 9 MG/ML
1000 INJECTION INTRAMUSCULAR; INTRAVENOUS; SUBCUTANEOUS
Refills: 0 | Status: DISCONTINUED | OUTPATIENT
Start: 2022-11-10 | End: 2022-11-24

## 2022-11-10 RX ADMIN — SODIUM CHLORIDE 250 MILLILITER(S): 9 INJECTION INTRAMUSCULAR; INTRAVENOUS; SUBCUTANEOUS at 07:58

## 2022-11-10 NOTE — H&P CARDIOLOGY - HISTORY OF PRESENT ILLNESS
58 y/o  male with PMHx of CAD with prior PCI stents in 2012 and CABG x 4V in 10/2017, Obesity, B/L Carotid stenosis, PAD by arterial doppler (no intervention at this time) T2DM - A1C unknown followed by PCP, HTN, HLD presents today for cardiac angiogram. Patient reports intermittent dyspnea with mild exertion associated with chest tightness with moderate exertion x 1 year. Previous angiogram in 2019 (see below)- no intervention at that time. Patient report abnormal stress test which was done on 2021- did not pursue angiogram. He now returns with progressive symptoms and here for [facility] for further ischemic evaluation.     CORONARY VESSELS: The coronary circulation is right dominant.  LM:   --  LM: Normal.  LAD:   --  Mid LAD: There was a 100 % stenosis.  --  D1: Angiography showed severe atherosclerosis.  CX:   --  Proximal circumflex: Angiography showed severe atherosclerosis.  RCA:   --  Proximal RCA: There was a 100 % stenosis.  GRAFTS:   --  Graft to the LAD: The graft was a LIMA. Graft angiography  showed no evidence of disease.  --  Graft to the 1st diagonal: The graft was a saphenous vein graft. Graft  angiography showed no evidence of disease.  --  Graft to the 1st obtuse marginal: The graft was a saphenous vein graft.  Graft angiography showed no evidence of disease.  --  Graft to the RPDA: The graft was a saphenous vein graft. Graft  angiography showed no evidence of disease.  COMPLICATIONS: There were no complications.  DIAGNOSTIC RECOMMENDATIONS: The patient should continue with the present  medications.  Prepared and signed by  David Hoff M.D.  Signed 08/22/2019 11:34:39

## 2022-11-10 NOTE — ASU DISCHARGE PLAN (ADULT/PEDIATRIC) - ASU DC SPECIAL INSTRUCTIONSFT

## 2022-11-10 NOTE — H&P CARDIOLOGY - NSICDXPASTMEDICALHX_GEN_ALL_CORE_FT
PAST MEDICAL HISTORY:  Bilateral carotid artery disease     CAD (coronary artery disease) severe    Cornea conical, bilateral wears corrective lenses    DM (diabetes mellitus) Type II, Hg A1C 12.2 ( 8/18/17) , does not check sugar at home regularly    History of Bell's palsy 2015    History of blood clots s/p Bell's palsy, h/o bloot clot, patient reports being treated 2015    History of eczema     History of MI (myocardial infarction) 2012    HLD (hyperlipidemia)     HTN (hypertension)     Obesity morbid, BMI 40    Sleep apnea never evaluated, STOP BANG 8, high risk    Stented coronary artery x 3 ( 2012)

## 2022-11-10 NOTE — H&P CARDIOLOGY - NSICDXFAMILYHX_GEN_ALL_CORE_FT
FAMILY HISTORY:  Father  Still living? Yes, Estimated age: 71-80  Family history of diabetes mellitus, Age at diagnosis: Age Unknown  Family history of heart disease, Age at diagnosis: Age Unknown    Grandparent  Still living? No  Family history of diabetes mellitus, Age at diagnosis: Age Unknown  Family history of heart disease, Age at diagnosis: Age Unknown

## 2022-11-10 NOTE — H&P CARDIOLOGY - NS NEC GEN PE MLT EXAM PC
Bi Procedure Note    Patient Name: Iris Mcdowell    Date of Procedure: November 28, 2018    Preoperative Diagnosis: Bilateral Sacroiliac Joint Pain    Post Operative Diagnosis: same    Procedure: SI Joint Injection bilateral      Consent: Informed consent was obtained prior to the procedure. The patient was given the opportunity to ask questions regarding the procedure and its associated risks. In addition to the potential risks associated with the procedure itself, the patient was informed both verbally and in writing of potential side effects of the use of glucocorticoids. The patient appeared to comprehend the informed consent and desired to have the procedure performed. Procedure: The patient was placed in the prone position on the flouroscopy table and the back was prepped and draped in the usual sterile manner. A #22 gauge spinal needle was then advanced to lie within the SI joint after local Lidocaine 1% injection. 0.1 cc isovue used to verify position. The procedure was repeated for the contralateral SI joint. A total of 30 mg of preservative free dexamethasone and 5 ml of Lidocaine was introduced into SI joints. The injection area was cleaned and bandaids applied. No excessive bleeding was noted. Patient dressed and was discharged to home with instructions. Discussion:  (x) The patient tolerated the procedure well.     ( )       Aneesh Flores MD  November 28, 2018
No bruits; no thyromegaly or nodules

## 2022-11-10 NOTE — H&P CARDIOLOGY - NSICDXPASTSURGICALHX_GEN_ALL_CORE_FT
PAST SURGICAL HISTORY:  Fracture of arm 1987, left radial fracture/operation/hardware    History of coronary artery stent placement x3 (2012)    S/P CABG x 4

## 2022-11-10 NOTE — ASU PATIENT PROFILE, ADULT - FALL HARM RISK - UNIVERSAL INTERVENTIONS
Bed in lowest position, wheels locked, appropriate side rails in place/Call bell, personal items and telephone in reach/Instruct patient to call for assistance before getting out of bed or chair/Non-slip footwear when patient is out of bed/[place] to call system/Physically safe environment - no spills, clutter or unnecessary equipment/Purposeful Proactive Rounding/Room/bathroom lighting operational, light cord in reach

## 2022-11-10 NOTE — ASU DISCHARGE PLAN (ADULT/PEDIATRIC) - NS MD DC FALL RISK RISK
For information on Fall & Injury Prevention, visit: [email or web address] OR  [email or web address] OR  https://www.cdc.gov/steadi/patient.html

## 2023-01-05 ENCOUNTER — APPOINTMENT (OUTPATIENT)
Dept: VASCULAR SURGERY | Facility: CLINIC | Age: 58
End: 2023-01-05
Payer: MEDICARE

## 2023-01-05 VITALS
BODY MASS INDEX: 39.24 KG/M2 | HEIGHT: 67 IN | SYSTOLIC BLOOD PRESSURE: 158 MMHG | HEART RATE: 74 BPM | WEIGHT: 250 LBS | DIASTOLIC BLOOD PRESSURE: 84 MMHG | TEMPERATURE: 98.2 F

## 2023-01-05 PROCEDURE — 99214 OFFICE O/P EST MOD 30 MIN: CPT

## 2023-01-05 PROCEDURE — 93880 EXTRACRANIAL BILAT STUDY: CPT

## 2023-07-06 ENCOUNTER — APPOINTMENT (OUTPATIENT)
Dept: VASCULAR SURGERY | Facility: CLINIC | Age: 58
End: 2023-07-06
Payer: MEDICARE

## 2023-07-06 VITALS
HEIGHT: 67 IN | WEIGHT: 248 LBS | HEART RATE: 60 BPM | TEMPERATURE: 98.3 F | BODY MASS INDEX: 38.92 KG/M2 | DIASTOLIC BLOOD PRESSURE: 81 MMHG | SYSTOLIC BLOOD PRESSURE: 121 MMHG

## 2023-07-06 PROCEDURE — 93880 EXTRACRANIAL BILAT STUDY: CPT

## 2023-07-06 PROCEDURE — 99213 OFFICE O/P EST LOW 20 MIN: CPT

## 2023-07-08 NOTE — ASSESSMENT
[FreeTextEntry1] : A/P\par \par Asymptomatic moderate grade stenosis of LICA. Continue with medical management. Advice discussion with PMD regarding HLD management. Patient also has chronic peripheral neuropathy likely combination of diabetes and radiculopathy. He will discuss with PMD regarding spine specialist referral. \par \par Follow up in 6 months for repeat carotid duplex.

## 2023-07-08 NOTE — PHYSICAL EXAM
[2+] : left 2+ [Skin Ulcer] : no ulcer [Calm] : calm [de-identified] : obese male, NAD [FreeTextEntry1] : Legs warm and well perfused [de-identified] : unlabored [de-identified] : intact, well healed RLE incision from vein harvest

## 2023-07-08 NOTE — HISTORY OF PRESENT ILLNESS
[FreeTextEntry1] : FELIPE GUZMAN (: Aug 17 1965) is a 57 year old male who presents today for follow up evaluation of carotid artery disease. \par \par Today he reports feeling well. \par Denies prior history of stroke/TIA. \par Denies focal neurologic deficits including amaurosis fugax, slurred speech, and weakness in the arms/legs. \par He is on aspirin 81 mg and Plavix 75 mg. \par Takes atorvastatin "every once in awhile" due to myalgia. \par \par Of note, patient has bilateral vision impairment, L>R. He reports history of bilateral keratoconus as well as "bleeding in the eyes from diabetes." He otherwise denies fleeting vision. \par \par PMH: CAD/MI () s/p CABG (2017) and multiple coronary artery stents, IDDM, HLD, keratoconus, lumbosacral spine disease, h/o [place] Palsy \par \par 2023\par Carotid duplex demonstrates mild PATRICIA, moderate LICA (243/54) with greater than 50% distal CCA stenosis, and antegrade flow in bilateral vertebral arteries

## 2023-11-10 NOTE — ASU DISCHARGE PLAN (ADULT/PEDIATRIC) - CARE PROVIDER_API CALL
Susana Coulter  CARDIOLOGY  [address], [address]  [place] [address]  Phone: [phone]  Fax: [phone]  Established Patient  Follow Up Time: 2 weeks  
Kavya (spouse)

## 2024-01-11 ENCOUNTER — APPOINTMENT (OUTPATIENT)
Dept: VASCULAR SURGERY | Facility: CLINIC | Age: 59
End: 2024-01-11
Payer: MEDICARE

## 2024-01-11 VITALS
HEART RATE: 73 BPM | WEIGHT: 250 LBS | DIASTOLIC BLOOD PRESSURE: 96 MMHG | BODY MASS INDEX: 39.24 KG/M2 | SYSTOLIC BLOOD PRESSURE: 178 MMHG | HEIGHT: 67 IN | TEMPERATURE: 98.4 F

## 2024-01-11 VITALS — SYSTOLIC BLOOD PRESSURE: 174 MMHG | DIASTOLIC BLOOD PRESSURE: 104 MMHG | HEART RATE: 73 BPM

## 2024-01-11 DIAGNOSIS — I65.29 OCCLUSION AND STENOSIS OF UNSPECIFIED CAROTID ARTERY: ICD-10-CM

## 2024-01-11 PROCEDURE — 93880 EXTRACRANIAL BILAT STUDY: CPT

## 2024-01-11 PROCEDURE — 99213 OFFICE O/P EST LOW 20 MIN: CPT

## 2024-01-21 PROBLEM — I65.29 ASYMPTOMATIC CAROTID ARTERY STENOSIS WITHOUT INFARCTION: Status: ACTIVE | Noted: 2021-04-07

## 2024-01-21 NOTE — ASSESSMENT
[FreeTextEntry1] :   A/P  Asymptomatic carotid artery disease. Continue with medical management and biannual surveillance.  Follow up in 6 months for carotid duplex.   Reports 3 block calf discomfort. Distal pulses not appreciated on exam today. No concern for severe symptoms. Will obtain MIRNA/PVR at next follow-up.   Reports chest tightness at 1/2 block since for past few months. Patient will call his cardiologist and request timely eval.

## 2024-01-21 NOTE — HISTORY OF PRESENT ILLNESS
[FreeTextEntry1] :  FELIPE GUZMAN (: Aug 17 1965) is a 58 year old male who presents today for follow up evaluation of carotid artery disease.   Today he reports feeling okay.  Denies prior history of stroke/TIA.  Denies focal neurologic deficits including amaurosis fugax, slurred speech, and weakness in the arms/legs.  He is on aspirin 81 mg and Plavix 75 mg.  H/o mylagia with statins. Taking injectable medication for HLD.  C/O chronic neuropathy of the feet. Reports bilateral calf discomfort at 3 blocks. No rest pain. No poor or delayed healing.   Of note, patient has bilateral vision impairment, L>R. He reports history of bilateral keratoconus as well as "bleeding in the eyes from diabetes."   PMH: CAD/MI () s/p CABG (2017) and multiple coronary artery stents, IDDM, HLD, keratoconus, lumbosacral spine disease, h/o [place] Palsy   2024 Carotid duplex demonstrates: PATRICIA -- mild stenosis (146/45) LICA -- moderate stenosis (distal CCA - 239/51; prox ICA  - 227/45) Bilateral vertebral arteries with antegrade flow

## 2024-01-21 NOTE — PHYSICAL EXAM
[2+] : left 2+ [0] : left 0 [Calm] : calm [Skin Ulcer] : no ulcer [de-identified] : obese male, NAD [FreeTextEntry1] : Feet pink with brisk capillary refill Well healed RLE incision from vein harvest [de-identified] : unlabored [de-identified] : soft, NT [de-identified] : grossly normal

## 2024-02-21 ENCOUNTER — OUTPATIENT (OUTPATIENT)
Dept: OUTPATIENT SERVICES | Facility: HOSPITAL | Age: 59
LOS: 1 days | End: 2024-02-21
Payer: MEDICARE

## 2024-02-21 DIAGNOSIS — R07.9 CHEST PAIN, UNSPECIFIED: ICD-10-CM

## 2024-02-21 DIAGNOSIS — R00.2 PALPITATIONS: ICD-10-CM

## 2024-02-21 DIAGNOSIS — I20.9 ANGINA PECTORIS, UNSPECIFIED: ICD-10-CM

## 2024-02-21 DIAGNOSIS — S42.309A UNSPECIFIED FRACTURE OF SHAFT OF HUMERUS, UNSPECIFIED ARM, INITIAL ENCOUNTER FOR CLOSED FRACTURE: Chronic | ICD-10-CM

## 2024-02-21 DIAGNOSIS — Z95.5 PRESENCE OF CORONARY ANGIOPLASTY IMPLANT AND GRAFT: Chronic | ICD-10-CM

## 2024-02-21 DIAGNOSIS — I10 ESSENTIAL (PRIMARY) HYPERTENSION: ICD-10-CM

## 2024-02-21 DIAGNOSIS — Z95.1 PRESENCE OF AORTOCORONARY BYPASS GRAFT: Chronic | ICD-10-CM

## 2024-02-21 DIAGNOSIS — I25.10 ATHEROSCLEROTIC HEART DISEASE OF NATIVE CORONARY ARTERY WITHOUT ANGINA PECTORIS: ICD-10-CM

## 2024-02-21 PROCEDURE — 93016 CV STRESS TEST SUPVJ ONLY: CPT

## 2024-02-21 PROCEDURE — 93017 CV STRESS TEST TRACING ONLY: CPT

## 2024-02-21 PROCEDURE — 82962 GLUCOSE BLOOD TEST: CPT

## 2024-02-21 PROCEDURE — 78452 HT MUSCLE IMAGE SPECT MULT: CPT

## 2024-02-21 PROCEDURE — 93018 CV STRESS TEST I&R ONLY: CPT

## 2024-02-21 PROCEDURE — 78452 HT MUSCLE IMAGE SPECT MULT: CPT | Mod: 26

## 2024-02-21 PROCEDURE — A9500: CPT

## 2024-04-09 ENCOUNTER — TRANSCRIPTION ENCOUNTER (OUTPATIENT)
Age: 59
End: 2024-04-09

## 2024-04-09 ENCOUNTER — OUTPATIENT (OUTPATIENT)
Dept: OUTPATIENT SERVICES | Facility: HOSPITAL | Age: 59
LOS: 1 days | End: 2024-04-09
Payer: COMMERCIAL

## 2024-04-09 VITALS
OXYGEN SATURATION: 96 % | TEMPERATURE: 98 F | RESPIRATION RATE: 22 BRPM | DIASTOLIC BLOOD PRESSURE: 90 MMHG | SYSTOLIC BLOOD PRESSURE: 176 MMHG | WEIGHT: 250 LBS | HEART RATE: 86 BPM | HEIGHT: 67 IN

## 2024-04-09 VITALS
DIASTOLIC BLOOD PRESSURE: 72 MMHG | RESPIRATION RATE: 19 BRPM | OXYGEN SATURATION: 96 % | SYSTOLIC BLOOD PRESSURE: 170 MMHG | HEART RATE: 80 BPM

## 2024-04-09 DIAGNOSIS — R94.39 ABNORMAL RESULT OF OTHER CARDIOVASCULAR FUNCTION STUDY: ICD-10-CM

## 2024-04-09 DIAGNOSIS — Z95.5 PRESENCE OF CORONARY ANGIOPLASTY IMPLANT AND GRAFT: Chronic | ICD-10-CM

## 2024-04-09 DIAGNOSIS — Z95.1 PRESENCE OF AORTOCORONARY BYPASS GRAFT: Chronic | ICD-10-CM

## 2024-04-09 DIAGNOSIS — S42.309A UNSPECIFIED FRACTURE OF SHAFT OF HUMERUS, UNSPECIFIED ARM, INITIAL ENCOUNTER FOR CLOSED FRACTURE: Chronic | ICD-10-CM

## 2024-04-09 LAB
ANION GAP SERPL CALC-SCNC: 14 MMOL/L — SIGNIFICANT CHANGE UP (ref 5–17)
BUN SERPL-MCNC: 13 MG/DL — SIGNIFICANT CHANGE UP (ref 7–23)
CALCIUM SERPL-MCNC: 9 MG/DL — SIGNIFICANT CHANGE UP (ref 8.4–10.5)
CHLORIDE SERPL-SCNC: 99 MMOL/L — SIGNIFICANT CHANGE UP (ref 96–108)
CO2 SERPL-SCNC: 21 MMOL/L — LOW (ref 22–31)
CREAT SERPL-MCNC: 0.72 MG/DL — SIGNIFICANT CHANGE UP (ref 0.5–1.3)
EGFR: 107 ML/MIN/1.73M2 — SIGNIFICANT CHANGE UP
GLUCOSE BLDC GLUCOMTR-MCNC: 247 MG/DL — HIGH (ref 70–99)
GLUCOSE SERPL-MCNC: 277 MG/DL — HIGH (ref 70–99)
HCT VFR BLD CALC: 46.2 % — SIGNIFICANT CHANGE UP (ref 39–50)
HGB BLD-MCNC: 15.1 G/DL — SIGNIFICANT CHANGE UP (ref 13–17)
MCHC RBC-ENTMCNC: 27.4 PG — SIGNIFICANT CHANGE UP (ref 27–34)
MCHC RBC-ENTMCNC: 32.7 GM/DL — SIGNIFICANT CHANGE UP (ref 32–36)
MCV RBC AUTO: 83.7 FL — SIGNIFICANT CHANGE UP (ref 80–100)
NRBC # BLD: 0 /100 WBCS — SIGNIFICANT CHANGE UP (ref 0–0)
PLATELET # BLD AUTO: 337 K/UL — SIGNIFICANT CHANGE UP (ref 150–400)
POTASSIUM SERPL-MCNC: 4.3 MMOL/L — SIGNIFICANT CHANGE UP (ref 3.5–5.3)
POTASSIUM SERPL-SCNC: 4.3 MMOL/L — SIGNIFICANT CHANGE UP (ref 3.5–5.3)
RBC # BLD: 5.52 M/UL — SIGNIFICANT CHANGE UP (ref 4.2–5.8)
RBC # FLD: 14.6 % — HIGH (ref 10.3–14.5)
SODIUM SERPL-SCNC: 134 MMOL/L — LOW (ref 135–145)
WBC # BLD: 11.93 K/UL — HIGH (ref 3.8–10.5)
WBC # FLD AUTO: 11.93 K/UL — HIGH (ref 3.8–10.5)

## 2024-04-09 PROCEDURE — 80048 BASIC METABOLIC PNL TOTAL CA: CPT

## 2024-04-09 PROCEDURE — 93459 L HRT ART/GRFT ANGIO: CPT | Mod: 26

## 2024-04-09 PROCEDURE — 85027 COMPLETE CBC AUTOMATED: CPT

## 2024-04-09 PROCEDURE — 93010 ELECTROCARDIOGRAM REPORT: CPT

## 2024-04-09 PROCEDURE — C1769: CPT

## 2024-04-09 PROCEDURE — C1887: CPT

## 2024-04-09 PROCEDURE — 36415 COLL VENOUS BLD VENIPUNCTURE: CPT

## 2024-04-09 PROCEDURE — 93005 ELECTROCARDIOGRAM TRACING: CPT

## 2024-04-09 PROCEDURE — C1894: CPT

## 2024-04-09 PROCEDURE — 99152 MOD SED SAME PHYS/QHP 5/>YRS: CPT

## 2024-04-09 PROCEDURE — 93459 L HRT ART/GRFT ANGIO: CPT

## 2024-04-09 PROCEDURE — 82962 GLUCOSE BLOOD TEST: CPT

## 2024-04-09 RX ORDER — GABAPENTIN 400 MG/1
1 CAPSULE ORAL
Qty: 0 | Refills: 0 | DISCHARGE

## 2024-04-09 RX ORDER — EVOLOCUMAB 140 MG/ML
0 INJECTION, SOLUTION SUBCUTANEOUS
Qty: 0 | Refills: 0 | DISCHARGE

## 2024-04-09 RX ORDER — SODIUM CHLORIDE 9 MG/ML
1000 INJECTION INTRAMUSCULAR; INTRAVENOUS; SUBCUTANEOUS
Refills: 0 | Status: DISCONTINUED | OUTPATIENT
Start: 2024-04-09 | End: 2024-04-23

## 2024-04-09 RX ORDER — NITROGLYCERIN 6.5 MG
1 CAPSULE, EXTENDED RELEASE ORAL
Refills: 0 | DISCHARGE

## 2024-04-09 RX ORDER — ISOSORBIDE MONONITRATE 60 MG/1
1 TABLET, EXTENDED RELEASE ORAL
Qty: 0 | Refills: 0 | DISCHARGE

## 2024-04-09 RX ORDER — ISOSORBIDE MONONITRATE 60 MG/1
1 TABLET, EXTENDED RELEASE ORAL
Refills: 0 | DISCHARGE

## 2024-04-09 RX ORDER — METOPROLOL TARTRATE 50 MG
1 TABLET ORAL
Qty: 0 | Refills: 0 | DISCHARGE

## 2024-04-09 RX ORDER — SODIUM CHLORIDE 9 MG/ML
250 INJECTION INTRAMUSCULAR; INTRAVENOUS; SUBCUTANEOUS ONCE
Refills: 0 | Status: COMPLETED | OUTPATIENT
Start: 2024-04-09 | End: 2024-04-09

## 2024-04-09 RX ORDER — ALIROCUMAB 75 MG/ML
150 INJECTION, SOLUTION SUBCUTANEOUS
Refills: 0 | DISCHARGE

## 2024-04-09 RX ORDER — RANOLAZINE 500 MG/1
500 TABLET, FILM COATED, EXTENDED RELEASE ORAL
Refills: 0 | DISCHARGE

## 2024-04-09 RX ORDER — VALSARTAN 80 MG/1
1 TABLET ORAL
Qty: 0 | Refills: 0 | DISCHARGE

## 2024-04-09 RX ORDER — PANTOPRAZOLE SODIUM 20 MG/1
1 TABLET, DELAYED RELEASE ORAL
Qty: 0 | Refills: 0 | DISCHARGE

## 2024-04-09 RX ORDER — INSULIN GLARGINE 100 [IU]/ML
34 INJECTION, SOLUTION SUBCUTANEOUS
Qty: 0 | Refills: 0 | DISCHARGE

## 2024-04-09 RX ORDER — FOLIC ACID 0.8 MG
1 TABLET ORAL
Refills: 0 | DISCHARGE

## 2024-04-09 RX ADMIN — SODIUM CHLORIDE 75 MILLILITER(S): 9 INJECTION INTRAMUSCULAR; INTRAVENOUS; SUBCUTANEOUS at 06:51

## 2024-04-09 RX ADMIN — SODIUM CHLORIDE 500 MILLILITER(S): 9 INJECTION INTRAMUSCULAR; INTRAVENOUS; SUBCUTANEOUS at 06:51

## 2024-04-09 NOTE — H&P CARDIOLOGY - HISTORY OF PRESENT ILLNESS
58 y oM with PMhx HTN, HLD, CABG 2017 , moderate carotid stenosis, intolerance to statins, He presents for [place]. He usually walks 5-6 blocks without any issues. However, lately he has been c/o chest pressure /tightness substernal , relieved with rest. Stress test revealed infarct in the inferior , apical and apico anterior wall LVEF 47% .  No fever or chills, no N/V/D or abd pain .

## 2024-04-09 NOTE — ASU PATIENT PROFILE, ADULT - FALL HARM RISK - UNIVERSAL INTERVENTIONS
Bed in lowest position, wheels locked, appropriate side rails in place/Call bell, personal items and telephone in reach/Instruct patient to call for assistance before getting out of bed or chair/Non-slip footwear when patient is out of bed/[place] to call system/Physically safe environment - no spills, clutter or unnecessary equipment/Purposeful Proactive Rounding/Room/bathroom lighting operational, light cord in reach

## 2024-04-09 NOTE — ASU DISCHARGE PLAN (ADULT/PEDIATRIC) - CARE PROVIDER_API CALL
Susana Coulter  Cardiac Electrophysiology  [address], [address]  [place] [address]  Phone: [phone]  Fax: [phone]  Follow Up Time: 2 weeks

## 2024-04-09 NOTE — ASU DISCHARGE PLAN (ADULT/PEDIATRIC) - NS MD DC FALL RISK RISK
For information on Fall & Injury Prevention, visit: [email or web address] OR  [email or web address] OR  https://www.cdc.gov/steadi/patient.html
Verbalized Understanding

## 2024-07-18 ENCOUNTER — APPOINTMENT (OUTPATIENT)
Dept: VASCULAR SURGERY | Facility: CLINIC | Age: 59
End: 2024-07-18

## 2024-12-16 ENCOUNTER — INPATIENT (INPATIENT)
Facility: HOSPITAL | Age: 59
LOS: 1 days | Discharge: LEFT BEFORE DISCHARGE | End: 2024-12-18
Attending: PSYCHIATRY & NEUROLOGY | Admitting: PSYCHIATRY & NEUROLOGY
Payer: MEDICARE

## 2024-12-16 VITALS
WEIGHT: 259.93 LBS | DIASTOLIC BLOOD PRESSURE: 108 MMHG | OXYGEN SATURATION: 97 % | RESPIRATION RATE: 16 BRPM | HEIGHT: 67 IN | SYSTOLIC BLOOD PRESSURE: 181 MMHG | TEMPERATURE: 99 F | HEART RATE: 92 BPM

## 2024-12-16 DIAGNOSIS — Z29.9 ENCOUNTER FOR PROPHYLACTIC MEASURES, UNSPECIFIED: ICD-10-CM

## 2024-12-16 DIAGNOSIS — Z95.5 PRESENCE OF CORONARY ANGIOPLASTY IMPLANT AND GRAFT: Chronic | ICD-10-CM

## 2024-12-16 DIAGNOSIS — S42.309A UNSPECIFIED FRACTURE OF SHAFT OF HUMERUS, UNSPECIFIED ARM, INITIAL ENCOUNTER FOR CLOSED FRACTURE: Chronic | ICD-10-CM

## 2024-12-16 DIAGNOSIS — H53.2 DIPLOPIA: ICD-10-CM

## 2024-12-16 DIAGNOSIS — Z95.1 PRESENCE OF AORTOCORONARY BYPASS GRAFT: Chronic | ICD-10-CM

## 2024-12-16 LAB
ANION GAP SERPL CALC-SCNC: 9 MMOL/L — SIGNIFICANT CHANGE UP (ref 5–17)
BASOPHILS # BLD AUTO: 0.05 K/UL — SIGNIFICANT CHANGE UP (ref 0–0.2)
BASOPHILS NFR BLD AUTO: 0.4 % — SIGNIFICANT CHANGE UP (ref 0–2)
BUN SERPL-MCNC: 15 MG/DL — SIGNIFICANT CHANGE UP (ref 7–23)
CALCIUM SERPL-MCNC: 8.9 MG/DL — SIGNIFICANT CHANGE UP (ref 8.4–10.5)
CHLORIDE SERPL-SCNC: 99 MMOL/L — SIGNIFICANT CHANGE UP (ref 96–108)
CO2 SERPL-SCNC: 27 MMOL/L — SIGNIFICANT CHANGE UP (ref 22–31)
CREAT SERPL-MCNC: 0.67 MG/DL — SIGNIFICANT CHANGE UP (ref 0.5–1.3)
CRP SERPL-MCNC: 11.5 MG/L — HIGH (ref 0–4)
EGFR: 109 ML/MIN/1.73M2 — SIGNIFICANT CHANGE UP
EOSINOPHIL # BLD AUTO: 0.17 K/UL — SIGNIFICANT CHANGE UP (ref 0–0.5)
EOSINOPHIL NFR BLD AUTO: 1.4 % — SIGNIFICANT CHANGE UP (ref 0–6)
ERYTHROCYTE [SEDIMENTATION RATE] IN BLOOD: 25 MM/HR — HIGH
GLUCOSE SERPL-MCNC: 161 MG/DL — HIGH (ref 70–99)
HCT VFR BLD CALC: 46.3 % — SIGNIFICANT CHANGE UP (ref 39–50)
HGB BLD-MCNC: 14.8 G/DL — SIGNIFICANT CHANGE UP (ref 13–17)
IMM GRANULOCYTES NFR BLD AUTO: 0.7 % — SIGNIFICANT CHANGE UP (ref 0–0.9)
LYMPHOCYTES # BLD AUTO: 1.7 K/UL — SIGNIFICANT CHANGE UP (ref 1–3.3)
LYMPHOCYTES # BLD AUTO: 14.5 % — SIGNIFICANT CHANGE UP (ref 13–44)
MCHC RBC-ENTMCNC: 27.3 PG — SIGNIFICANT CHANGE UP (ref 27–34)
MCHC RBC-ENTMCNC: 32 G/DL — SIGNIFICANT CHANGE UP (ref 32–36)
MCV RBC AUTO: 85.3 FL — SIGNIFICANT CHANGE UP (ref 80–100)
MONOCYTES # BLD AUTO: 0.87 K/UL — SIGNIFICANT CHANGE UP (ref 0–0.9)
MONOCYTES NFR BLD AUTO: 7.4 % — SIGNIFICANT CHANGE UP (ref 2–14)
NEUTROPHILS # BLD AUTO: 8.87 K/UL — HIGH (ref 1.8–7.4)
NEUTROPHILS NFR BLD AUTO: 75.6 % — SIGNIFICANT CHANGE UP (ref 43–77)
NRBC # BLD: 0 /100 WBCS — SIGNIFICANT CHANGE UP (ref 0–0)
PLATELET # BLD AUTO: 288 K/UL — SIGNIFICANT CHANGE UP (ref 150–400)
POTASSIUM SERPL-MCNC: 4 MMOL/L — SIGNIFICANT CHANGE UP (ref 3.5–5.3)
POTASSIUM SERPL-SCNC: 4 MMOL/L — SIGNIFICANT CHANGE UP (ref 3.5–5.3)
RBC # BLD: 5.43 M/UL — SIGNIFICANT CHANGE UP (ref 4.2–5.8)
RBC # FLD: 15.3 % — HIGH (ref 10.3–14.5)
SODIUM SERPL-SCNC: 135 MMOL/L — SIGNIFICANT CHANGE UP (ref 135–145)
WBC # BLD: 11.74 K/UL — HIGH (ref 3.8–10.5)
WBC # FLD AUTO: 11.74 K/UL — HIGH (ref 3.8–10.5)

## 2024-12-16 PROCEDURE — 71045 X-RAY EXAM CHEST 1 VIEW: CPT | Mod: 26

## 2024-12-16 PROCEDURE — 99285 EMERGENCY DEPT VISIT HI MDM: CPT

## 2024-12-16 PROCEDURE — 70496 CT ANGIOGRAPHY HEAD: CPT | Mod: 26,MC

## 2024-12-16 PROCEDURE — 70498 CT ANGIOGRAPHY NECK: CPT | Mod: 26,MC

## 2024-12-16 PROCEDURE — 70450 CT HEAD/BRAIN W/O DYE: CPT | Mod: 26,MC,XU

## 2024-12-16 RX ORDER — KETOROLAC TROMETHAMINE 30 MG/ML
15 INJECTION INTRAMUSCULAR; INTRAVENOUS ONCE
Refills: 0 | Status: DISCONTINUED | OUTPATIENT
Start: 2024-12-16 | End: 2024-12-16

## 2024-12-16 RX ORDER — VALSARTAN 320 MG/1
320 TABLET ORAL DAILY
Refills: 0 | Status: DISCONTINUED | OUTPATIENT
Start: 2024-12-16 | End: 2024-12-18

## 2024-12-16 RX ORDER — INFLUENZA VIRUS VACCINE 15; 15; 15; 15 UG/.5ML; UG/.5ML; UG/.5ML; UG/.5ML
0.5 SUSPENSION INTRAMUSCULAR ONCE
Refills: 0 | Status: DISCONTINUED | OUTPATIENT
Start: 2024-12-16 | End: 2024-12-18

## 2024-12-16 RX ORDER — SODIUM CHLORIDE 9 MG/ML
1000 INJECTION, SOLUTION INTRAMUSCULAR; INTRAVENOUS; SUBCUTANEOUS ONCE
Refills: 0 | Status: COMPLETED | OUTPATIENT
Start: 2024-12-16 | End: 2024-12-16

## 2024-12-16 RX ORDER — PANTOPRAZOLE SODIUM 40 MG/1
1 TABLET, DELAYED RELEASE ORAL
Refills: 0 | DISCHARGE

## 2024-12-16 RX ORDER — INSULIN GLARGINE 100 [IU]/ML
34 INJECTION, SOLUTION SUBCUTANEOUS EVERY MORNING
Refills: 0 | Status: DISCONTINUED | OUTPATIENT
Start: 2024-12-17 | End: 2024-12-18

## 2024-12-16 RX ORDER — 0.9 % SODIUM CHLORIDE 0.9 %
1000 INTRAVENOUS SOLUTION INTRAVENOUS
Refills: 0 | Status: DISCONTINUED | OUTPATIENT
Start: 2024-12-16 | End: 2024-12-18

## 2024-12-16 RX ORDER — GABAPENTIN 300 MG/1
300 CAPSULE ORAL DAILY
Refills: 0 | Status: DISCONTINUED | OUTPATIENT
Start: 2024-12-16 | End: 2024-12-18

## 2024-12-16 RX ORDER — METOPROLOL TARTRATE 100 MG/1
100 TABLET, FILM COATED ORAL DAILY
Refills: 0 | Status: DISCONTINUED | OUTPATIENT
Start: 2024-12-16 | End: 2024-12-18

## 2024-12-16 RX ORDER — INSULIN GLARGINE 100 [IU]/ML
34 INJECTION, SOLUTION SUBCUTANEOUS AT BEDTIME
Refills: 0 | Status: DISCONTINUED | OUTPATIENT
Start: 2024-12-16 | End: 2024-12-18

## 2024-12-16 RX ORDER — GLUCAGON INJECTION, SOLUTION 0.5 MG/.1ML
1 INJECTION, SOLUTION SUBCUTANEOUS ONCE
Refills: 0 | Status: DISCONTINUED | OUTPATIENT
Start: 2024-12-16 | End: 2024-12-18

## 2024-12-16 RX ORDER — ACETAMINOPHEN 500MG 500 MG/1
1000 TABLET, COATED ORAL ONCE
Refills: 0 | Status: COMPLETED | OUTPATIENT
Start: 2024-12-16 | End: 2024-12-16

## 2024-12-16 RX ORDER — ENOXAPARIN SODIUM 30 MG/.3ML
40 INJECTION SUBCUTANEOUS EVERY 24 HOURS
Refills: 0 | Status: DISCONTINUED | OUTPATIENT
Start: 2024-12-16 | End: 2024-12-16

## 2024-12-16 RX ORDER — INSULIN ASPART 100 [IU]/ML
22 INJECTION, SOLUTION INTRAVENOUS; SUBCUTANEOUS
Refills: 0 | DISCHARGE

## 2024-12-16 RX ORDER — ISOSORBIDE MONONITRATE 10 MG
120 TABLET ORAL DAILY
Refills: 0 | Status: DISCONTINUED | OUTPATIENT
Start: 2024-12-16 | End: 2024-12-18

## 2024-12-16 RX ORDER — CLOPIDOGREL 75 MG/1
75 TABLET, FILM COATED ORAL DAILY
Refills: 0 | Status: DISCONTINUED | OUTPATIENT
Start: 2024-12-17 | End: 2024-12-18

## 2024-12-16 RX ORDER — ENOXAPARIN SODIUM 30 MG/.3ML
40 INJECTION SUBCUTANEOUS EVERY 12 HOURS
Refills: 0 | Status: DISCONTINUED | OUTPATIENT
Start: 2024-12-16 | End: 2024-12-18

## 2024-12-16 RX ORDER — ISOSORBIDE MONONITRATE 10 MG
125 TABLET ORAL DAILY
Refills: 0 | Status: DISCONTINUED | OUTPATIENT
Start: 2024-12-16 | End: 2024-12-16

## 2024-12-16 RX ORDER — PANTOPRAZOLE SODIUM 40 MG/1
40 TABLET, DELAYED RELEASE ORAL
Refills: 0 | Status: DISCONTINUED | OUTPATIENT
Start: 2024-12-16 | End: 2024-12-18

## 2024-12-16 RX ORDER — ACETAMINOPHEN 500MG 500 MG/1
650 TABLET, COATED ORAL EVERY 6 HOURS
Refills: 0 | Status: DISCONTINUED | OUTPATIENT
Start: 2024-12-16 | End: 2024-12-18

## 2024-12-16 RX ADMIN — ACETAMINOPHEN 500MG 400 MILLIGRAM(S): 500 TABLET, COATED ORAL at 18:15

## 2024-12-16 RX ADMIN — KETOROLAC TROMETHAMINE 15 MILLIGRAM(S): 30 INJECTION INTRAMUSCULAR; INTRAVENOUS at 18:15

## 2024-12-16 RX ADMIN — INSULIN GLARGINE 34 UNIT(S): 100 INJECTION, SOLUTION SUBCUTANEOUS at 23:36

## 2024-12-16 RX ADMIN — VALSARTAN 320 MILLIGRAM(S): 320 TABLET ORAL at 23:04

## 2024-12-16 RX ADMIN — GABAPENTIN 300 MILLIGRAM(S): 300 CAPSULE ORAL at 23:03

## 2024-12-16 RX ADMIN — METOPROLOL TARTRATE 100 MILLIGRAM(S): 100 TABLET, FILM COATED ORAL at 23:04

## 2024-12-16 RX ADMIN — Medication 120 MILLIGRAM(S): at 23:03

## 2024-12-16 RX ADMIN — Medication 4: at 23:03

## 2024-12-16 RX ADMIN — SODIUM CHLORIDE 1000 MILLILITER(S): 9 INJECTION, SOLUTION INTRAMUSCULAR; INTRAVENOUS; SUBCUTANEOUS at 18:15

## 2024-12-16 NOTE — PATIENT PROFILE ADULT - PUBLIC BENEFITS
DATE OF PROCEDURE: 08/10/2023    PREOPERATIVE DIAGNOSIS: Degenerative medial and lateral meniscus tear, Left knee.  Osteoarthritis right knee  POSTOPERATIVE DIAGNOSIS: Degenerative medial and lateral meniscus tear, Left knee.  Osteoarthritis right knee  PROCEDURES PERFORMED:   Left knee arthroscopic medial and lateral meniscectomy  Right knee intra-articular corticosteroid injection  SURGEON: ATIF Garzon M.D.   ASSISTANT: First assistant:Tariq Gannon, certified physician assistant, who was necessary and essential for all aspects of the operation, including but no limited to patient positioning, surgical exposure, arthroscopic manipulation for exposure, wound closure, and dressing placement.   ANESTHESIA: General.   COMPLICATIONS: None.   EBL: Minimal  COUNTS: Correct.   DISPOSITION: Recovery Room, stable.   OPERATIVE FINDINGS:  Radial tear posterior horn and body medial meniscus and anterior horn and body tear of lateral meniscus left knee  SPECIMENS: None.   INDICATIONS FOR PROCEDURE: Dayna Nagel is a 75 y.o.  year old female who is having   symptoms of Bilateral knee pain and had not responded to nonoperative measures.   Physical examination and imaging studies were consistent with a medial meniscus   tear. Treatment options were explained and it was decided to proceed with a   left  knee arthroscopy and right knee intra-articular corticosteroid injection She was aware of reasonable treatment options as well   as risks and benefits.   PROCEDURE IN DETAIL: After appropriate consent was obtained, the patient   brought in the Operating Room, anesthesia was administered.She received   antibiotic prophylaxis.  After sterile prep the right knee joint was injected intra-articularly with 2 cc of corticosteroid and 2 cc of lidocaine.  Patient tolerated the procedure well and a Band-Aid was applied.  The left lower extremity was prepped and draped in the   usual sterile fashion. Tourniquet was not applied or  utilized. Anterolateral   arthroscopy portal and anteromedial work portal were established. Diagnostic   arthroscopy was begun. The suprapatellar pouch was free of pathology. There   was no pathology in either medial or lateral gutter. She had no changes of   the patellofemoral joint.  The articular cartilage of the patellofemoral joint was normal.  Some loose chondral flaps were gently debrided around the periphery of the trochlea.  Medial compartment was entered. Articular cartilage was normal. There   was a degenerative tear of the posterior horn of medial meniscus extending from   the posterior horn to junction of the posterior mid third. With a series of   biters and a shaver, this was debrided to a stable rim. Meniscus was probed in   its entirety. No other pathology was noted. There was no pathology   posteriorly. ACL was probed and found to be intact. No pathology about the   PCL. Lateral compartment was entered.  Lateral compartment articular cartilage had grade 4 changes centrally on the tibial plateau.  The articular cartilage on the lateral femoral condyle was normal.  There was a degenerative anterior horn tear of the lateral meniscus that extended into the body.  This tear was debrided with an arthroscopic shaver back to a stable rim. Repeat diagnostic   arthroscopy revealed no further pathology. Arthroscopic equipment was removed.   Excess fluid was drained through the outflow cannula. This was then removed.   The incisions were closed with nylon suture. Joint was injected for postoperative pain control with local anesthetic. A sterile dressing was applied. She   was awakened, extubated and brought to Recovery Room in stable condition,   tolerated the procedure well, and there were no known complications.     no

## 2024-12-16 NOTE — ED ADULT TRIAGE NOTE - CHIEF COMPLAINT QUOTE
received left eye diabetic retinopathy injection 12/6, c/o "eyes feeling crossed with HA and barely being able to see" since. states he is due to have his right eye injection this friday.

## 2024-12-16 NOTE — ED PROVIDER NOTE - PROGRESS NOTE DETAILS
Beny Brink MD: Spoke with stroke neurology team--per Dr. Orellana--if CT without evidence of stroke, recommending general neurology consult. CT with some segments of vessel stenosis but not severe and not occluded. General neurology team consulted for eval of lateral rectus palsy. Beny Brink MD: Per gen neuro NP--admit under Dr. Sotelo--volunteer neurology attending currently covering gen neuro service--I could not find this physician's name as an accepting physician in the system--spoke with him directly--requests to admit under Dr. Chidi Branch for now, who he is covering.

## 2024-12-16 NOTE — ED PROVIDER NOTE - PHYSICAL EXAMINATION
Gen - NAD; well-appearing; A+Ox3   HEENT - NCAT, +R lateral rectus palsy, no VF cuts, no scleral injection, PERRL, moist mucous membranes, clear oropharynx, no temporal tenderness b/l, IOP 16-18 b/l, no periorbital swelling, no drainage  Neck - supple  Resp - CTAB, no increased WOB  CV -  RRR, no m/r/g  Abd - soft, NT, ND; no guarding or rebound  MSK - FROM of b/l UE and LE, no gross deformities  Extrem - no LE edema/erythema/tenderness  Neuro - R lateral rectus palsy as noted above, otherwise nonfocal  Skin - warm, well perfused

## 2024-12-16 NOTE — PATIENT PROFILE ADULT - FALL HARM RISK - HARM RISK INTERVENTIONS

## 2024-12-16 NOTE — H&P ADULT - PROBLEM SELECTOR PLAN 1
Binocular Diplopia after retina injection.     - Obtain MRI w/o contrast to r/o stroke and intracranial hemorrhage.  - Obtain UA and UC to r/o UTI for elevated WBC 11.7  - Chest X-ray  for elevated WBC 11.7  - Repeat CBC tomorrow to trend WBC

## 2024-12-16 NOTE — ED PROVIDER NOTE - CLINICAL SUMMARY MEDICAL DECISION MAKING FREE TEXT BOX
59 year old male with history of HTN, HLD, CAD s/p CABG, diabetic retinopathy undergoing retinal injections b/l outpatient, presenting with double vision and HA--noted to have R lateral rectus palsy, consistent with his symptomology (resolves with either eye closed). Will need CT imaging including angios. Outside 24 hour code stroke window. IOPs minimally elevated and equal b/l--no concern for acute angle closure glaucoma. Will send ESR/CRP given concurrent headache as well. Stroke neurology eval.

## 2024-12-16 NOTE — H&P ADULT - NSHPREVIEWOFSYSTEMS_GEN_ALL_CORE
CONSTITUTIONAL:  No weight loss, fever, chills, weakness or fatigue.   HEENT:  Eyes:  + Double vision. No visual loss, Ears, Nose, Throat:  No hearing loss, sneezing, congestion, runny nose or sore throat.  SKIN:  No rash or itching.  CARDIOVASCULAR:  No chest pain, chest pressure or chest discomfort. No palpitations or edema.  RESPIRATORY:  No shortness of breath, cough or sputum.  GASTROINTESTINAL:  No anorexia, nausea, vomiting or diarrhea. No abdominal pain or blood.  GENITOURINARY: No Burning on urination.   NEUROLOGICAL:  see HPI  MUSCULOSKELETAL:  No muscle, back pain, joint pain or stiffness.

## 2024-12-16 NOTE — H&P ADULT - HISTORY OF PRESENT ILLNESS
59 year old male with history of HTN, Diabetes, Bell's palsy, HLD, CAD s/p CABG, diabetic retinopathy undergoing retinal injections b/l outpatient, presenting with dizziness, double vision and R frontal/temporal HA and pressure behind his eye (throbbing, rated 9/10- despite tylenol) since his retina injection 10 days ago. Patient reports double vision  when he both eyes are open and focusing on image but resolves when he covers or closes either eye.  Patient endorses getting retina injections every other week at [facility] for the past 4-5 months and this has never happened before. He stated he hasn't seen his ophthalmologist in over 1-2 years since they check his eyes as well. He wears hard premium contact lens but removed them prior to this interview.  No known history of glaucoma. Denies any vision loss, fever, cough, nausea, vomiting, numbness, weakness.

## 2024-12-16 NOTE — ED ADULT NURSE NOTE - OBJECTIVE STATEMENT
59yoM PMH CABG (2017), DM, HLD, came to ED c/o headache, dizziness, and vision changes. Pt states that " I have been getting injections in my eyes for diabetic retinopathy. I received a dose in my L eye 12/6, for the past week I have been having R sided headahce, crossed vision and I feel dizzy. I called my doctor and they told me to come to the ED to be evaluated". Pt ambulates with steady gait, no redness or drainage from eye. Pt has been taking extra strength tylenols with no relief. Pt states his symptoms have stayed the same. Pt denies chest pain, fevers, SOB

## 2024-12-16 NOTE — H&P ADULT - PROBLEM SELECTOR PLAN 2
Nutrition & Prophylaxis:    F: None  E:  K>4, Mg>2, Phos >3  N: Regular diet  DVT PPx: SCDs  GI PPx: None  Dispo: TBD- [facility]  Code: FULL CODE

## 2024-12-16 NOTE — ED ADULT NURSE NOTE - NS ED NOTE  TALK SOMEONE YN
The labs look good except the ldl and triglycerides which are up sig from last year.  No meds are needed  Work on a low fat diet primary found in red meat and full fat dairy products.  Eliminate trans fats (partially hydrogenated vegetable oil) food in margarines, store bought cookies, crackers cakes.  Eat food rich in omega 3 fatty acids including salmon mackerel herring walnuts and flaxseeds.  Increase soluble fiber in oatmeal, kidney beans, apple and pears.  Add whey protein.  Also exercise and losing weight can help    Recheck in a year No

## 2024-12-16 NOTE — H&P ADULT - ASSESSMENT
59 year old male with history of HTN, Diabetes, Bell's palsy, HLD, CAD s/p CABG, diabetic retinopathy undergoing retinal injections b/l outpatient, presenting with dizziness, Binocular Diplopia, and R frontal/temporal HA and pressure behind his eye (throbbing, rated 9/10- despite tylenol) since his retina  injection 10 days ago. Admitted for partial/full diabetic nerve palsy, encephalitis, meningitis, stroke workup unlikely SAH. 59 year old male with history of HTN, Diabetes, Bell's palsy, HLD, CAD s/p CABG, diabetic retinopathy undergoing retinal injections b/l outpatient, presenting with dizziness, Binocular Diplopia, and R frontal/temporal HA and pressure behind his eye (throbbing, rated 9/10- despite tylenol) since his retina  injection 10 days ago. Admitted for partial/full diabetic nerve palsy, encephalitis, meningitis, stroke workup.  Patient cannot fully abduct R eye, sixth nerve palsy secondary to diabetes vs. injury from injection.    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  - Obtain MRI w/o contrast to r/o stroke  - Obtain UA and UC to r/o UTI for elevated WBC 11.7  - Chest X-ray  for elevated WBC 11.7  - Repeat CBC tomorrow to trend WBC  - eye patch    #DM  - home Lantus - 34 BID  - home Novolog - 22 TID  - mISS  - consistent carb diet       #Neuropathy  - home gabapentin 300mg qD    #CAD s/p CABG  #HTN  - home ASA & Plavix  - home metoprolol succinate 100mg qD (evening)  - home isosorbide mononitrate 125mg qD (evening)  - home valsartan 320mg qD (evening)    #HLD  - takes Repatha q 2 weeks (already taken recently)    #Prophylaxis:  F: None  E:  K>4, Mg>2, Phos >3  N: consistent carb  DVT PPx: lovenox  GI PPx: home pantoprazole  Dispo: TBD- [facility]  Code: FULL CODE

## 2024-12-16 NOTE — H&P ADULT - ATTENDING COMMENTS
Diplopia and CNVI  palsy, likely related to microvascular disease given known CAD s/p bypass, DM, HTN.    MRI to screen for alternate dx.  OT/PT prior to D/C

## 2024-12-16 NOTE — H&P ADULT - NSHPLABSRESULTS_GEN_ALL_CORE
CBC Full  -  ( 16 Dec 2024 13:28 )  WBC Count : 11.74 K/uL  RBC Count : 5.43 M/uL  Hemoglobin : 14.8 g/dL  Hematocrit : 46.3 %  Platelet Count - Automated : 288 K/uL  Mean Cell Volume : 85.3 fl  Mean Cell Hemoglobin : 27.3 pg  Mean Cell Hemoglobin Concentration : 32.0 g/dL  Auto Neutrophil # : 8.87 K/uL  Auto Lymphocyte # : 1.70 K/uL  Auto Monocyte # : 0.87 K/uL  Auto Eosinophil # : 0.17 K/uL  Auto Basophil # : 0.05 K/uL  Auto Neutrophil % : 75.6 %  Auto Lymphocyte % : 14.5 %  Auto Monocyte % : 7.4 %  Auto Eosinophil % : 1.4 %  Auto Basophil % : 0.4 %    12-16    135  |  99  |  15  ----------------------------<  161[H]  4.0   |  27  |  0.67    Ca    8.9      16 Dec 2024 13:28      12/16/2024    CT brain:  No hydrocephalus, acute intracranial hemorrhage, mass effect, or brain   edema.    CTA brain:  Moderate short segment stenosis of the proximal precavernous right ICA.   Mild to moderate short segment stenosis of the distal cavernous right   ICA. Otherwise normal flow-related enhancement of the right skull   base/intracranial ICA.    Mild to moderate multifocal short segment stenoses of the left intradural   vertebral artery.  No large vessel occlusion or vascular aneurysm. No AVM.  Venous system is well opacified, no evidence for venous sinus or cortical   vein thrombosis.    CTA neck:  65-70% short segment stenosis of the origin of the right internal carotid   artery due to mildly calcified plaque. Normal flow-related enhancement of   the more distal vessel.    No large vessel occlusion. No acute arterial dissection. No vascular   aneurysm.    --- End of Report ---

## 2024-12-16 NOTE — H&P ADULT - NSHPPHYSICALEXAM_GEN_ALL_CORE
Constitutional: Appearance is consistent with chronologic age. No acute distress  Cardiovascular: Regular rate and rhythm, no murmurs, rubs, or gallops. Extremities are warm and well perfused. Capillary refill is less than 2 seconds.    Pulmonary: Anterior breath sounds clear bilaterally, no crackles or wheezing. No use of accessory muscles  GI: Positive bowel sounds. Abdomen soft, non-distended, non-tender. No guarding or rebound.   Extremities: No significant deformity or joint abnormality. Radial and DP pulses +2, No edema.  Skin: normal color, texture and turgor with no lesions or eruptions.    Neurologic:  -Mental status: Awake, alert, oriented to person, place, and date. Speech is fluent with intact naming, repetition, and comprehension, no dysarthria. Recent and remote memory intact. Follows commands. Attention/concentration intact.   Snellen Chart w/o contacts: 20/40  -Cranial nerves:   Pupils equally round and reactive to light. 3mm Brisk.   V:  Facial sensation V1-V3 equal and intact   VII: Face is symmetric with normal eye closure and smile  VIII: Hearing is bilaterally intact to finger rub  IX, X: Uvula is midline and soft palate rises symmetrically  XI: Head turning and shoulder shrug are intact.  XII: Tongue protrudes midline  Motor: Normal bulk and tone. No pronator drift.   Strength:     [] Upper extremity                      Delt       Bicep    Tricep                                                  R         5/5        5/5        5/5       5/5                                               L          5/5        5/5        5/5       5/5  [] Lower extremity                       HF          KE          KF        DF         PF                                               R        5/5        5/5        5/5       5/5       5/5                                               L         5/5        5/5       5/5       5/5        5/5    Sensation: Intact to light touch in all extremities.  Coordination: No dysmetria on finger-to-nose bilaterally  Reflexes: 2+ b/l biceps, triceps, patella and achilles. Plantar response downgoing b/l Constitutional: Appearance is consistent with chronologic age. No acute distress  Cardiovascular: Regular rate and rhythm, no murmurs, rubs, or gallops. Extremities are warm and well perfused. Capillary refill is less than 2 seconds.    Pulmonary: Anterior breath sounds clear bilaterally, no crackles or wheezing. No use of accessory muscles  GI: Positive bowel sounds. Abdomen soft, non-distended, non-tender. No guarding or rebound.   Extremities: No significant deformity or joint abnormality. Radial and DP pulses +2, No edema.  Skin: normal color, texture and turgor with no lesions or eruptions.    Neurologic:  -Mental status: Awake, alert, oriented to person, place, and date. Speech is fluent with intact naming, repetition, and comprehension, no dysarthria. Recent and remote memory intact. Follows commands. Attention/concentration intact.   Snellen Chart w/o contacts: 20/40  -Cranial nerves:   Pupils equally round and reactive to light. 3mm Brisk.   V:  Facial sensation V1-V3 equal and intact   VI: cannot fully abduct R eye ****  VII: Face is symmetric with normal eye closure and smile  VIII: Hearing is bilaterally intact to finger rub  IX, X: Uvula is midline and soft palate rises symmetrically  XI: Head turning and shoulder shrug are intact.  XII: Tongue protrudes midline  Motor: Normal bulk and tone. No pronator drift.   Strength:     [] Upper extremity                      Delt       Bicep    Tricep                                                  R         5/5        5/5        5/5       5/5                                               L          5/5        5/5        5/5       5/5  [] Lower extremity                       HF          KE          KF        DF         PF                                               R        5/5        5/5        5/5       5/5       5/5                                               L         5/5        5/5       5/5       5/5        5/5    Sensation: Intact to light touch in all extremities.  Coordination: No dysmetria on finger-to-nose bilaterally  Reflexes: 2+ b/l biceps, triceps, patella and achilles. Plantar response downgoing b/l

## 2024-12-16 NOTE — ED PROVIDER NOTE - OBJECTIVE STATEMENT
59 year old male with history of HTN, HLD, CAD s/p CABG, diabetic retinopathy undergoing retinal injections b/l outpatient, presenting with double vision and HA. States since his last injection 10d ago, has had R frontal headache with double vision anytime he has both eyes open, resolves when he closes either eye. Has not had any vision loss. No known history of glaucoma. No f/c, vomiting, numbness, weakness.

## 2024-12-17 LAB
A1C WITH ESTIMATED AVERAGE GLUCOSE RESULT: 9.5 % — HIGH (ref 4–5.6)
ALBUMIN SERPL ELPH-MCNC: 3.4 G/DL — SIGNIFICANT CHANGE UP (ref 3.3–5)
ALP SERPL-CCNC: 76 U/L — SIGNIFICANT CHANGE UP (ref 40–120)
ALT FLD-CCNC: 7 U/L — LOW (ref 10–45)
ANION GAP SERPL CALC-SCNC: 7 MMOL/L — SIGNIFICANT CHANGE UP (ref 5–17)
AST SERPL-CCNC: 12 U/L — SIGNIFICANT CHANGE UP (ref 10–40)
BASOPHILS # BLD AUTO: 0.04 K/UL — SIGNIFICANT CHANGE UP (ref 0–0.2)
BASOPHILS NFR BLD AUTO: 0.4 % — SIGNIFICANT CHANGE UP (ref 0–2)
BILIRUB SERPL-MCNC: 0.6 MG/DL — SIGNIFICANT CHANGE UP (ref 0.2–1.2)
BUN SERPL-MCNC: 19 MG/DL — SIGNIFICANT CHANGE UP (ref 7–23)
CALCIUM SERPL-MCNC: 8.1 MG/DL — LOW (ref 8.4–10.5)
CHLORIDE SERPL-SCNC: 100 MMOL/L — SIGNIFICANT CHANGE UP (ref 96–108)
CHOLEST SERPL-MCNC: 107 MG/DL — SIGNIFICANT CHANGE UP
CO2 SERPL-SCNC: 27 MMOL/L — SIGNIFICANT CHANGE UP (ref 22–31)
CREAT SERPL-MCNC: 0.75 MG/DL — SIGNIFICANT CHANGE UP (ref 0.5–1.3)
EGFR: 105 ML/MIN/1.73M2 — SIGNIFICANT CHANGE UP
EOSINOPHIL # BLD AUTO: 0.19 K/UL — SIGNIFICANT CHANGE UP (ref 0–0.5)
EOSINOPHIL NFR BLD AUTO: 1.9 % — SIGNIFICANT CHANGE UP (ref 0–6)
ESTIMATED AVERAGE GLUCOSE: 226 MG/DL — HIGH (ref 68–114)
GLUCOSE SERPL-MCNC: 162 MG/DL — HIGH (ref 70–99)
HCT VFR BLD CALC: 43.4 % — SIGNIFICANT CHANGE UP (ref 39–50)
HDLC SERPL-MCNC: 30 MG/DL — LOW
HGB BLD-MCNC: 13.7 G/DL — SIGNIFICANT CHANGE UP (ref 13–17)
IGA FLD-MCNC: 560 MG/DL — HIGH (ref 84–499)
IGG FLD-MCNC: 910 MG/DL — SIGNIFICANT CHANGE UP (ref 610–1660)
IGM SERPL-MCNC: 45 MG/DL — SIGNIFICANT CHANGE UP (ref 35–242)
IMM GRANULOCYTES NFR BLD AUTO: 1 % — HIGH (ref 0–0.9)
KAPPA LC SER QL IFE: 3.35 MG/DL — HIGH (ref 0.33–1.94)
KAPPA/LAMBDA FREE LIGHT CHAIN RATIO, SERUM: 1.12 RATIO — SIGNIFICANT CHANGE UP (ref 0.26–1.65)
LAMBDA LC SER QL IFE: 2.99 MG/DL — HIGH (ref 0.57–2.63)
LDH SERPL L TO P-CCNC: 165 U/L — SIGNIFICANT CHANGE UP (ref 50–242)
LIPID PNL WITH DIRECT LDL SERPL: 61 MG/DL — SIGNIFICANT CHANGE UP
LYMPHOCYTES # BLD AUTO: 1.98 K/UL — SIGNIFICANT CHANGE UP (ref 1–3.3)
LYMPHOCYTES # BLD AUTO: 19.4 % — SIGNIFICANT CHANGE UP (ref 13–44)
MAGNESIUM SERPL-MCNC: 1.8 MG/DL — SIGNIFICANT CHANGE UP (ref 1.6–2.6)
MCHC RBC-ENTMCNC: 27.3 PG — SIGNIFICANT CHANGE UP (ref 27–34)
MCHC RBC-ENTMCNC: 31.6 G/DL — LOW (ref 32–36)
MCV RBC AUTO: 86.6 FL — SIGNIFICANT CHANGE UP (ref 80–100)
MONOCYTES # BLD AUTO: 0.79 K/UL — SIGNIFICANT CHANGE UP (ref 0–0.9)
MONOCYTES NFR BLD AUTO: 7.8 % — SIGNIFICANT CHANGE UP (ref 2–14)
NEUTROPHILS # BLD AUTO: 7.09 K/UL — SIGNIFICANT CHANGE UP (ref 1.8–7.4)
NEUTROPHILS NFR BLD AUTO: 69.5 % — SIGNIFICANT CHANGE UP (ref 43–77)
NON HDL CHOLESTEROL: 77 MG/DL — SIGNIFICANT CHANGE UP
NRBC # BLD: 0 /100 WBCS — SIGNIFICANT CHANGE UP (ref 0–0)
PHOSPHATE SERPL-MCNC: 4.1 MG/DL — SIGNIFICANT CHANGE UP (ref 2.5–4.5)
PLATELET # BLD AUTO: 278 K/UL — SIGNIFICANT CHANGE UP (ref 150–400)
POTASSIUM SERPL-MCNC: 3.8 MMOL/L — SIGNIFICANT CHANGE UP (ref 3.5–5.3)
POTASSIUM SERPL-SCNC: 3.8 MMOL/L — SIGNIFICANT CHANGE UP (ref 3.5–5.3)
PROT SERPL-MCNC: 5.9 G/DL — LOW (ref 6–8.3)
PROT SERPL-MCNC: 6.5 G/DL — SIGNIFICANT CHANGE UP (ref 6–8.3)
RBC # BLD: 5.01 M/UL — SIGNIFICANT CHANGE UP (ref 4.2–5.8)
RBC # FLD: 15.3 % — HIGH (ref 10.3–14.5)
SODIUM SERPL-SCNC: 134 MMOL/L — LOW (ref 135–145)
TRIGL SERPL-MCNC: 77 MG/DL — SIGNIFICANT CHANGE UP
TSH SERPL-MCNC: 2.49 UIU/ML — SIGNIFICANT CHANGE UP (ref 0.27–4.2)
WBC # BLD: 10.19 K/UL — SIGNIFICANT CHANGE UP (ref 3.8–10.5)
WBC # FLD AUTO: 10.19 K/UL — SIGNIFICANT CHANGE UP (ref 3.8–10.5)

## 2024-12-17 PROCEDURE — 99223 1ST HOSP IP/OBS HIGH 75: CPT

## 2024-12-17 PROCEDURE — 99233 SBSQ HOSP IP/OBS HIGH 50: CPT

## 2024-12-17 RX ORDER — POTASSIUM CHLORIDE 600 MG/1
20 TABLET, EXTENDED RELEASE ORAL ONCE
Refills: 0 | Status: COMPLETED | OUTPATIENT
Start: 2024-12-17 | End: 2024-12-17

## 2024-12-17 RX ADMIN — Medication 22 UNIT(S): at 12:59

## 2024-12-17 RX ADMIN — Medication 2: at 22:42

## 2024-12-17 RX ADMIN — Medication 22 UNIT(S): at 09:02

## 2024-12-17 RX ADMIN — Medication 81 MILLIGRAM(S): at 12:57

## 2024-12-17 RX ADMIN — ENOXAPARIN SODIUM 40 MILLIGRAM(S): 30 INJECTION SUBCUTANEOUS at 08:16

## 2024-12-17 RX ADMIN — ENOXAPARIN SODIUM 40 MILLIGRAM(S): 30 INJECTION SUBCUTANEOUS at 18:32

## 2024-12-17 RX ADMIN — POTASSIUM CHLORIDE 20 MILLIEQUIVALENT(S): 600 TABLET, EXTENDED RELEASE ORAL at 13:00

## 2024-12-17 RX ADMIN — INSULIN GLARGINE 34 UNIT(S): 100 INJECTION, SOLUTION SUBCUTANEOUS at 08:16

## 2024-12-17 RX ADMIN — Medication 100 GRAM(S): at 12:43

## 2024-12-17 RX ADMIN — Medication 120 MILLIGRAM(S): at 12:58

## 2024-12-17 RX ADMIN — VALSARTAN 320 MILLIGRAM(S): 320 TABLET ORAL at 08:15

## 2024-12-17 RX ADMIN — METOPROLOL TARTRATE 100 MILLIGRAM(S): 100 TABLET, FILM COATED ORAL at 08:16

## 2024-12-17 RX ADMIN — GABAPENTIN 300 MILLIGRAM(S): 300 CAPSULE ORAL at 12:57

## 2024-12-17 RX ADMIN — PANTOPRAZOLE SODIUM 40 MILLIGRAM(S): 40 TABLET, DELAYED RELEASE ORAL at 08:17

## 2024-12-17 RX ADMIN — CLOPIDOGREL 75 MILLIGRAM(S): 75 TABLET, FILM COATED ORAL at 12:57

## 2024-12-17 RX ADMIN — INSULIN GLARGINE 34 UNIT(S): 100 INJECTION, SOLUTION SUBCUTANEOUS at 21:57

## 2024-12-17 RX ADMIN — Medication 2: at 12:58

## 2024-12-17 NOTE — PHYSICAL THERAPY INITIAL EVALUATION ADULT - ADDITIONAL COMMENTS
Pt. reports elevator access, but elevator is frequently not working in which case pt has to negotiate 3 flights of stairs. Pt. has been ambulatory w/o device, but reports limited ambulation tolerance due to R hip OA/pain. No further adaptive equipment/no current services.

## 2024-12-17 NOTE — CONSULT NOTE ADULT - SUBJECTIVE AND OBJECTIVE BOX
HPI:  59 year old male with history of HTN, Diabetes, Bell's palsy, HLD, CAD s/p CABG, diabetic retinopathy undergoing retinal injections b/l outpatient, presenting with dizziness, double vision and R frontal/temporal HA and pressure behind his eye (throbbing, rated 9/10- despite tylenol) since his retina injection 10 days ago. Patient reports double vision  when he both eyes are open and focusing on image but resolves when he covers or closes either eye.  Patient endorses getting retina injections every other week at [facility] for the past 4-5 months and this has never happened before. He stated he hasn't seen his ophthalmologist in over 1-2 years since they check his eyes as well. He wears hard premium contact lens but removed them prior to this interview.  No known history of glaucoma. Denies any vision loss, fever, cough, nausea, vomiting, numbness, weakness. (16 Dec 2024 18:24)      PAST MEDICAL & SURGICAL HISTORY:  HTN (hypertension)      HLD (hyperlipidemia)      DM (diabetes mellitus)  Type II, Hg A1C 12.2 ( 8/18/17) , does not check sugar at home regularly      CAD (coronary artery disease)  severe      Obesity  morbid, BMI 40      Cornea conical, bilateral  wears corrective lenses      Stented coronary artery  x 3 ( 2012)      History of MI (myocardial infarction)  2012      Bilateral carotid artery disease      Sleep apnea  never evaluated, STOP BANG 8, high risk      History of Bell's palsy  2015      History of blood clots  s/p Bell's palsy, h/o bloot clot, patient reports being treated 2015      History of eczema      Fracture of arm  1987, left radial fracture/operation/hardware      History of coronary artery stent placement  x3 (2012)      S/P CABG x 4          Home Medications:  clopidogrel 75 mg oral tablet: 1 tab(s) orally once a day (09 Apr 2024 07:07)  gabapentin 300 mg oral capsule: 1 cap(s) orally once a day (09 Apr 2024 07:07)  isosorbide mononitrate 120 mg oral tablet, extended release: 1 tab(s) orally once a day (09 Apr 2024 07:07)  Lantus Solostar Pen 100 units/mL subcutaneous solution: 34 unit(s) subcutaneous 2 times a day (09 Apr 2024 07:07)  metoprolol succinate 100 mg oral tablet, extended release: 1 tab(s) orally once a day (09 Apr 2024 07:07)  NovoLOG 100 units/mL subcutaneous solution: 22 unit(s) subcutaneous 3 times a day (16 Dec 2024 20:14)  Protonix 40 mg oral delayed release tablet: 1 tab(s) orally once a day (16 Dec 2024 20:15)  Repatha 140 mg/mL subcutaneous solution: subcutaneous every 2 weeks  off of meds since May 2022- ins not cover (09 Apr 2024 07:07)  valsartan-hydrochlorothiazide 320 mg-25 mg oral tablet: 1 tab(s) orally once a day (09 Apr 2024 07:06)      Allergies    No Known Drug Allergies  fish (Unknown)    Intolerances        FAMILY HISTORY:  Family history of diabetes mellitus (Grandparent, Father)    Family history of heart disease (Grandparent, Father)        Social History:  Use to be a , now on disability since 2017 due to cardiac history. (16 Dec 2024 18:24)    CURRENT MEDICATIONS:   acetaminophen     Tablet .. 650 milliGRAM(s) Oral every 6 hours PRN  aspirin enteric coated 81 milliGRAM(s) Oral daily  clopidogrel Tablet 75 milliGRAM(s) Oral daily  dextrose 5%. 1000 milliLiter(s) IV Continuous <Continuous>  dextrose 5%. 1000 milliLiter(s) IV Continuous <Continuous>  dextrose 50% Injectable 25 Gram(s) IV Push once  dextrose 50% Injectable 12.5 Gram(s) IV Push once  dextrose 50% Injectable 25 Gram(s) IV Push once  dextrose Oral Gel 15 Gram(s) Oral once PRN  enoxaparin Injectable 40 milliGRAM(s) SubCutaneous every 12 hours  gabapentin 300 milliGRAM(s) Oral daily  glucagon  Injectable 1 milliGRAM(s) IntraMuscular once  influenza   Vaccine 0.5 milliLiter(s) IntraMuscular once  insulin glargine Injectable (LANTUS) 34 Unit(s) SubCutaneous every morning  insulin glargine Injectable (LANTUS) 34 Unit(s) SubCutaneous at bedtime  insulin lispro (ADMELOG) corrective regimen sliding scale   SubCutaneous Before meals and at bedtime  insulin lispro Injectable (ADMELOG) 22 Unit(s) SubCutaneous three times a day before meals  isosorbide   mononitrate ER Tablet (IMDUR) 120 milliGRAM(s) Oral daily  melatonin 3 milliGRAM(s) Oral at bedtime PRN  metoprolol succinate  milliGRAM(s) Oral daily  pantoprazole    Tablet 40 milliGRAM(s) Oral before breakfast  valsartan 320 milliGRAM(s) Oral daily      VITAL SIGNS, INS/OUTS (last 24 hours):  Vital Signs Last 24 Hrs  T(C): 37.3 (17 Dec 2024 20:39), Max: 37.3 (17 Dec 2024 20:39)  T(F): 99.2 (17 Dec 2024 20:39), Max: 99.2 (17 Dec 2024 20:39)  HR: 67 (18 Dec 2024 01:53) (57 - 67)  BP: 153/75 (18 Dec 2024 01:53) (110/66 - 153/75)  BP(mean): 65 (17 Dec 2024 20:39) (65 - 81)  RR: 18 (18 Dec 2024 01:53) (18 - 18)  SpO2: 95% (18 Dec 2024 01:53) (94% - 95%)    Parameters below as of 18 Dec 2024 01:53  Patient On (Oxygen Delivery Method): room air      I&O's Summary      PHYSICAL EXAM:  Gen: Morbidly obese male   HEENT:MMM  CV: RRR, +S1/S2  Pulm: adequate respiratory effort, no increase in work of breathing  Abd: soft, NTND  Skin: warm and dry, no new rashes vs prior report  Ext: WWP, no LE edema  Neuro: AOx3      BASIC LABS:                        13.7   10.19 )-----------( 278      ( 17 Dec 2024 05:30 )             43.4     12-17    134[L]  |  100  |  19  ----------------------------<  162[H]  3.8   |  27  |  0.75    Ca    8.1[L]      17 Dec 2024 05:30  Phos  4.1     12-17  Mg     1.8     12-17    TPro  6.5  /  Alb  3.4  /  TBili  0.6  /  DBili  x   /  AST  12  /  ALT  7[L]  /  AlkPhos  76  12-17      Urinalysis Basic - ( 17 Dec 2024 05:30 )    Color: x / Appearance: x / SG: x / pH: x  Gluc: 162 mg/dL / Ketone: x  / Bili: x / Urobili: x   Blood: x / Protein: x / Nitrite: x   Leuk Esterase: x / RBC: x / WBC x   Sq Epi: x / Non Sq Epi: x / Bacteria: x      CAPILLARY BLOOD GLUCOSE      POCT Blood Glucose.: 134 mg/dL (18 Dec 2024 01:49)  POCT Blood Glucose.: 158 mg/dL (17 Dec 2024 21:53)  POCT Blood Glucose.: 82 mg/dL (17 Dec 2024 17:32)  POCT Blood Glucose.: 174 mg/dL (17 Dec 2024 12:23)  POCT Blood Glucose.: 148 mg/dL (17 Dec 2024 08:13)      OTHER LABS:        MICRODATA:      IMAGING:    EKG:    #Diet - Diet, Consistent Carbohydrate w/Evening Snack (12-16-24 @ 19:53) [Active]        #DVT PPx -  #Dispo -

## 2024-12-17 NOTE — PHYSICAL THERAPY INITIAL EVALUATION ADULT - PERTINENT HX OF CURRENT PROBLEM, REHAB EVAL
Pt. is a 59 y.o male with h/o diabetic neuropathy and retinopathy - receiving retinal injections as out-patient who is presenting with dizziness, diplopia, R fronto-temporal HA since last R eye retinal injection 10 days PTA. HCT- negative for acute pathology, pending MRI, admitted for further w/u. Current differential diagnosis includes CN 6 palsy.

## 2024-12-17 NOTE — PHYSICAL THERAPY INITIAL EVALUATION ADULT - MODALITIES TREATMENT COMMENTS
NO focal neuro deficits, except impaired R eye abduction, impaired depth perception affecting UE coordination on finger to finger test; pt further p/w impaired negotiation of obstacles due limited peripheral vision on the right when R eye patched.

## 2024-12-17 NOTE — OCCUPATIONAL THERAPY INITIAL EVALUATION ADULT - GENERAL OBSERVATIONS, REHAB EVAL
Pt received semi-supine in bed, +heplock, +gauze on R eye C/D/I, NAD, and agreeable to OT. PT Stephanie present for evaluation. Cleared by CARRI Gonsalves to see.

## 2024-12-17 NOTE — OCCUPATIONAL THERAPY INITIAL EVALUATION ADULT - MODALITIES TREATMENT COMMENTS
Vision Tracking (H-Test): smooth bilateral pursuits;  Vision Quadrant Test: intact bilaterally. **INCOMPLETE NO focal neuro deficits, except impaired R eye abduction, impaired depth perception affecting UE coordination on finger to finger test; pt further p/w impaired negotiation of obstacles due limited peripheral vision on the right when R eye patched.

## 2024-12-17 NOTE — CONSULT NOTE ADULT - ASSESSMENT
59 year old male with history of HTN, Diabetes, Bell's palsy, HLD, CAD s/p CABG, diabetic retinopathy undergoing retinal injections b/l outpatient, presenting with dizziness, Binocular Diplopia, and R frontal/temporal HA and pressure behind his eye (throbbing, rated 9/10- despite tylenol) since his retina  injection 10 days ago. Admitted for partial/full diabetic nerve palsy, encephalitis, meningitis, stroke workup.      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  f/u MRI w/o contrast to r/o stroke  Consider ophthalmology consult  Management as per neuro team      #DM  #Morbid obesity   On Lantus - 34 BID and Novolog - 22 TID at home   Continue 34 U lantus BID and 22U admelog before meals + ISS  consistent carb diet     #Neuropathy  c/w home gabapentin 300mg qD    #CAD s/p CABG  #HTN  c/w home ASA & Plavix, metoprolol, isosorbide and valsartan wit hold parameters     #HLD  takes Repatha q 2 weeks (already taken recently)    Lovenox for DVT ppx       59 year old male with history of HTN, Diabetes, Bell's palsy, HLD, CAD s/p CABG, diabetic retinopathy undergoing retinal injections b/l outpatient, presenting with dizziness, Binocular Diplopia, and R frontal/temporal HA and pressure behind his eye (throbbing, rated 9/10- despite tylenol) since his retina  injection 10 days ago. Admitted for partial/full diabetic nerve palsy, encephalitis, meningitis, stroke workup.      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

## 2024-12-17 NOTE — PROGRESS NOTE ADULT - ASSESSMENT
59 year old male with history of HTN, Diabetes, Bell's palsy, HLD, CAD s/p CABG, diabetic retinopathy undergoing retinal injections b/l outpatient, presenting with dizziness, Binocular Diplopia, and R frontal/temporal HA and pressure behind his eye (throbbing, rated 9/10- despite tylenol) since his retina  injection 10 days ago. Admitted for partial/full diabetic nerve palsy, encephalitis, meningitis, stroke workup.  Patient cannot fully abduct R eye, sixth nerve palsy secondary to diabetes vs. injury from injection.    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  - Obtain MRI w/o contrast to r/o stroke  - Obtain UA and UC to r/o UTI for elevated WBC 11.7  - Chest X-ray  for elevated WBC 11.7  - Repeat CBC tomorrow to trend WBC  - eye patch    #DM  - home Lantus - 34 BID  - home Novolog - 22 TID  - mISS  - consistent carb diet       #Neuropathy  - home gabapentin 300mg qD    #CAD s/p CABG  #HTN  - home ASA & Plavix  - home metoprolol succinate 100mg qD (evening)  - home isosorbide mononitrate 125mg qD (evening)  - home valsartan 320mg qD (evening)    #HLD  - takes Repatha q 2 weeks (already taken recently)    #Prophylaxis:  F: None  E:  K>4, Mg>2, Phos >3  N: consistent carb  DVT PPx: lovenox  GI PPx: home pantoprazole  Dispo: TBD- Regional  Code: FULL CODE 59 year old male with history of HTN, Diabetes, Bell's palsy, HLD, CAD s/p CABG, diabetic retinopathy undergoing retinal injections b/l outpatient, presenting with dizziness, Binocular Diplopia, and R frontal/temporal HA and pressure behind his eye (throbbing, rated 9/10- despite tylenol) since his retina  injection 10 days ago. Admitted for partial/full diabetic nerve palsy, encephalitis, meningitis, stroke workup.  Patient cannot fully abduct R eye, sixth nerve palsy secondary to diabetes vs. injury from injection.    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  - f/u MRI w/o contrast to r/o stroke  - f/u UA and UC to r/o UTI   - Repeat CBC tomorrow to trend WBC--> leukocytosis resolved 12/17  - eye patch    #DM  - home Lantus - 34 BID  - home Novolog - 22 TID  - mISS  - consistent carb diet       #Neuropathy  - home gabapentin 300mg qD    #CAD s/p CABG  #HTN  - c/w home ASA & Plavix  - c/w home metoprolol succinate 100mg qD (evening)  - c/w home isosorbide mononitrate 125mg qD (evening)  - c/w home valsartan 320mg qD (evening)    #HLD  - takes Repatha q 2 weeks (already taken recently)    #Prophylaxis:  F: None  E:  K>4, Mg>2, Phos >3  N: consistent carb  DVT PPx: lovenox  GI PPx: home pantoprazole  Dispo: TBD- Regional  Code: FULL CODE 59 year old male with history of HTN, Diabetes, Bell's palsy, HLD, CAD s/p CABG, diabetic retinopathy undergoing retinal injections b/l outpatient, presenting with dizziness, Binocular Diplopia, and R frontal/temporal HA and pressure behind his eye (throbbing, rated 9/10- despite tylenol) since his retina  injection 10 days ago. Admitted for partial/full diabetic nerve palsy, encephalitis, meningitis, stroke workup.  Patient cannot fully abduct R eye; most likely sixth nerve palsy 2/2 to poorly controlled diabetes. Despite the time course, less likely injury from injection, as the site of injury is the opposite side from the injection, and the mechanism required to injure CN6 would be unlikely to result from this type of injection.    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  - f/u MRI w/o contrast to r/o stroke  - f/u UA and UC to r/o UTI   - Repeat CBC tomorrow to trend WBC--> leukocytosis resolved 12/17  - eye patch (OT)  - f/u PT/OT/Physiatry    #DM  - c/w home Lantus - 34 BID  - c/w home Novolog - 22 TID  - mISS  - consistent carb diet       #Neuropathy  - c/w home gabapentin 300mg qD    #CAD s/p CABG  #HTN  - c/w home ASA & Plavix  - c/w home metoprolol succinate 100mg qD (evening)  - c/w home isosorbide mononitrate 125mg qD (evening)  - c/w home valsartan 320mg qD (evening)    #HLD  - takes Repatha q 2 weeks (already taken recently)    #Prophylaxis:  F: None  E:  K>4, Mg>2, Phos >3  N: consistent carb  DVT PPx: lovenox  GI PPx: home pantoprazole  Dispo: TBD- Regional  Code: FULL CODE

## 2024-12-17 NOTE — OCCUPATIONAL THERAPY INITIAL EVALUATION ADULT - SENSORY TESTS
Cranial Nerves II - XII: II: PERRLA; visual fields are full to confrontation except seeing "double" when both eyes open. However vision converges into one about 3 inches away from his face. III, IV, VI: EOMI, no nystagmus appreciated V: facial sensation intact to light touch V1-V3 b/l VII: no ptosis, no facial droop, symmetric eyebrow raise and closure VIII: hearing intact to finger rub b/l  XI: head turning and shoulder shrug intact b/l XII: tongue protrusion midline Cranial Nerves II - XII: III, IV, VI: EOMI, no nystagmus appreciated V: facial sensation intact to light touch V1-V3 b/l VII: no ptosis, no facial droop, symmetric eyebrow raise and closure VIII: hearing intact to finger rub b/l  XI: head turning and shoulder shrug intact b/l XII: tongue protrusion midline

## 2024-12-17 NOTE — PROGRESS NOTE ADULT - ATTENDING COMMENTS
Pt with history of CAD s/p bypass and stents, HTN and diabetes, presenting with diplopia and right IV partial palsy.  Also pain in the right periorbital and temporal.  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  OT/PT prior to D/C due to feeling unsteady, though improved with right eye closure.

## 2024-12-17 NOTE — OCCUPATIONAL THERAPY INITIAL EVALUATION ADULT - MODIFIED CLINICAL TEST OF SENSORY INTEGRATION IN BALANCE TEST
Pt able to ambulate 150 feet with close supervision using no AD, demonstrating fairly steady gait, antalgic gait 2/2 R hip pain, and decreased step length. Pt requires verbal cues t/o to avoid obstacles especially in peripheral vision.

## 2024-12-17 NOTE — PROGRESS NOTE ADULT - SUBJECTIVE AND OBJECTIVE BOX
Neurology Progress Note    Interval History:          PAST MEDICAL & SURGICAL HISTORY:  HTN (hypertension)    HLD (hyperlipidemia)    DM (diabetes mellitus)  Type II, Hg A1C 12.2 ( 8/18/17) , does not check sugar at home regularly    CAD (coronary artery disease)  severe    Obesity  morbid, BMI 40    Cornea conical, bilateral  wears corrective lenses    Stented coronary artery  x 3 ( 2012)    History of MI (myocardial infarction)  2012    Bilateral carotid artery disease    Sleep apnea  never evaluated, STOP BANG 8, high risk    History of Bell's palsy  2015    History of blood clots  s/p Bell's palsy, h/o bloot clot, patient reports being treated 2015    History of eczema    Fracture of arm  1987, left radial fracture/operation/hardware    History of coronary artery stent placement  x3 (2012)    S/P CABG x 4            Medications:  acetaminophen     Tablet .. 650 milliGRAM(s) Oral every 6 hours PRN  aspirin enteric coated 81 milliGRAM(s) Oral daily  clopidogrel Tablet 75 milliGRAM(s) Oral daily  dextrose 5%. 1000 milliLiter(s) IV Continuous <Continuous>  dextrose 5%. 1000 milliLiter(s) IV Continuous <Continuous>  dextrose 50% Injectable 25 Gram(s) IV Push once  dextrose 50% Injectable 12.5 Gram(s) IV Push once  dextrose 50% Injectable 25 Gram(s) IV Push once  dextrose Oral Gel 15 Gram(s) Oral once PRN  enoxaparin Injectable 40 milliGRAM(s) SubCutaneous every 12 hours  gabapentin 300 milliGRAM(s) Oral daily  glucagon  Injectable 1 milliGRAM(s) IntraMuscular once  influenza   Vaccine 0.5 milliLiter(s) IntraMuscular once  insulin glargine Injectable (LANTUS) 34 Unit(s) SubCutaneous every morning  insulin glargine Injectable (LANTUS) 34 Unit(s) SubCutaneous at bedtime  insulin lispro (ADMELOG) corrective regimen sliding scale   SubCutaneous Before meals and at bedtime  insulin lispro Injectable (ADMELOG) 22 Unit(s) SubCutaneous three times a day before meals  isosorbide   mononitrate ER Tablet (IMDUR) 120 milliGRAM(s) Oral daily  metoprolol succinate  milliGRAM(s) Oral daily  pantoprazole    Tablet 40 milliGRAM(s) Oral before breakfast  valsartan 320 milliGRAM(s) Oral daily      Vital Signs Last 24 Hrs  T(C): 36.4 (17 Dec 2024 06:30), Max: 37.2 (16 Dec 2024 18:00)  T(F): 97.5 (17 Dec 2024 06:30), Max: 98.9 (16 Dec 2024 18:00)  HR: 57 (17 Dec 2024 06:30) (57 - 92)  BP: 110/66 (17 Dec 2024 06:30) (110/66 - 191/107)  BP(mean): 81 (17 Dec 2024 06:30) (81 - 111)  RR: 18 (17 Dec 2024 06:30) (16 - 18)  SpO2: 94% (17 Dec 2024 06:30) (94% - 97%)    Parameters below as of 17 Dec 2024 06:30  Patient On (Oxygen Delivery Method): room air        Neurological Exam:   Mental status: Awake, alert and oriented x3.  Recent and remote memory intact.  Naming, repetition and comprehension intact.  Attention/concentration intact.  No dysarthria, no aphasia.  Fund of knowledge appropriate.    Cranial nerves: Pupils equally round and reactive to light, visual fields full, no nystagmus, extraocular muscles intact, V1 through V3 intact bilaterally and symmetric, face symmetric, hearing intact to finger rub, palate elevation symmetric, tongue was midline.  Motor:  MRC grading 5/5 b/l UE/LE.   strength 5/5.  Normal tone and bulk.  No abnormal movements.    Sensation: Intact to light touch, proprioception, and pinprick.   Coordination: No dysmetria on finger-to-nose and heel-to-shin.  No dysdiadokinesia.  Reflexes: 2+ in bilateral UE/LE, downgoing toes bilaterally. (-) Paige.  Gait: Narrow and steady. No ataxia.  Romberg negative    Labs:  CBC Full  -  ( 16 Dec 2024 13:28 )  WBC Count : 11.74 K/uL  RBC Count : 5.43 M/uL  Hemoglobin : 14.8 g/dL  Hematocrit : 46.3 %  Platelet Count - Automated : 288 K/uL  Mean Cell Volume : 85.3 fl  Mean Cell Hemoglobin : 27.3 pg  Mean Cell Hemoglobin Concentration : 32.0 g/dL  Auto Neutrophil # : 8.87 K/uL  Auto Lymphocyte # : 1.70 K/uL  Auto Monocyte # : 0.87 K/uL  Auto Eosinophil # : 0.17 K/uL  Auto Basophil # : 0.05 K/uL  Auto Neutrophil % : 75.6 %  Auto Lymphocyte % : 14.5 %  Auto Monocyte % : 7.4 %  Auto Eosinophil % : 1.4 %  Auto Basophil % : 0.4 %    12-16    135  |  99  |  15  ----------------------------<  161[H]  4.0   |  27  |  0.67    Ca    8.9      16 Dec 2024 13:28          Urinalysis Basic - ( 16 Dec 2024 13:28 )    Color: x / Appearance: x / SG: x / pH: x  Gluc: 161 mg/dL / Ketone: x  / Bili: x / Urobili: x   Blood: x / Protein: x / Nitrite: x   Leuk Esterase: x / RBC: x / WBC x   Sq Epi: x / Non Sq Epi: x / Bacteria: x        Assessment:  This is a 59y Male w/ h/o     Plan: Neurology Progress Note    Interval History:    The patient was encountered sitting up in bed, in NAD, but endorsing 9/10 headache lateralizing to the right side behind the eye. He also endorses continued diplopia. The patient states that if he covers one eye or the other, on forward gaze his vision is normal. However, if he tries to see with both eyes simultaneously, his vision is double. On EOM exam, rightward gaze elicits the diplopia most prominently. In terms of the headache, the patient states that it started on the day of his injectio (10 days prior), and is usually 8-9/10 in severity. He does note that it is intermittent, and it is less painful upon waking in the morning, as is the diplopia. Otherwise, the patient denies any new or acute complaints; specifically, he denies nausea, vomiting, palpitations, chills, or sweats. He also denies recent travel and sick contacts.       PAST MEDICAL & SURGICAL HISTORY:  HTN (hypertension)    HLD (hyperlipidemia)    DM (diabetes mellitus)  Type II, Hg A1C 12.2 ( 8/18/17) , does not check sugar at home regularly    CAD (coronary artery disease)  severe    Obesity  morbid, BMI 40    Cornea conical, bilateral  wears corrective lenses    Stented coronary artery  x 3 ( 2012)    History of MI (myocardial infarction)  2012    Bilateral carotid artery disease    Sleep apnea  never evaluated, STOP BANG 8, high risk    History of Bell's palsy  2015    History of blood clots  s/p Bell's palsy, h/o bloot clot, patient reports being treated 2015    History of eczema    Fracture of arm  1987, left radial fracture/operation/hardware    History of coronary artery stent placement  x3 (2012)    S/P CABG x 4            Medications:  acetaminophen     Tablet .. 650 milliGRAM(s) Oral every 6 hours PRN  aspirin enteric coated 81 milliGRAM(s) Oral daily  clopidogrel Tablet 75 milliGRAM(s) Oral daily  dextrose 5%. 1000 milliLiter(s) IV Continuous <Continuous>  dextrose 5%. 1000 milliLiter(s) IV Continuous <Continuous>  dextrose 50% Injectable 25 Gram(s) IV Push once  dextrose 50% Injectable 12.5 Gram(s) IV Push once  dextrose 50% Injectable 25 Gram(s) IV Push once  dextrose Oral Gel 15 Gram(s) Oral once PRN  enoxaparin Injectable 40 milliGRAM(s) SubCutaneous every 12 hours  gabapentin 300 milliGRAM(s) Oral daily  glucagon  Injectable 1 milliGRAM(s) IntraMuscular once  influenza   Vaccine 0.5 milliLiter(s) IntraMuscular once  insulin glargine Injectable (LANTUS) 34 Unit(s) SubCutaneous every morning  insulin glargine Injectable (LANTUS) 34 Unit(s) SubCutaneous at bedtime  insulin lispro (ADMELOG) corrective regimen sliding scale   SubCutaneous Before meals and at bedtime  insulin lispro Injectable (ADMELOG) 22 Unit(s) SubCutaneous three times a day before meals  isosorbide   mononitrate ER Tablet (IMDUR) 120 milliGRAM(s) Oral daily  metoprolol succinate  milliGRAM(s) Oral daily  pantoprazole    Tablet 40 milliGRAM(s) Oral before breakfast  valsartan 320 milliGRAM(s) Oral daily      Vital Signs Last 24 Hrs  T(C): 36.4 (17 Dec 2024 06:30), Max: 37.2 (16 Dec 2024 18:00)  T(F): 97.5 (17 Dec 2024 06:30), Max: 98.9 (16 Dec 2024 18:00)  HR: 57 (17 Dec 2024 06:30) (57 - 92)  BP: 110/66 (17 Dec 2024 06:30) (110/66 - 191/107)  BP(mean): 81 (17 Dec 2024 06:30) (81 - 111)  RR: 18 (17 Dec 2024 06:30) (16 - 18)  SpO2: 94% (17 Dec 2024 06:30) (94% - 97%)    Parameters below as of 17 Dec 2024 06:30  Patient On (Oxygen Delivery Method): room air        Neurological Exam:   Mental status: Awake, alert and oriented x3.  Recent and remote memory intact.  Naming, repetition and comprehension intact.  Attention/concentration intact.  No dysarthria, no aphasia.     Cranial nerves: Pupils equally round and reactive to light, visual fields decreased in the temporal fields superiorly and inferiorly, no nystagmus, decreased abduction of the R eye with increased diplopia on rightward gaze. OD 20/70; OS 20/50. Face symmetric, hearing intact to finger rub, palate elevation symmetric, tongue was midline.  Motor:  MRC grading 5/5 b/l UE/LE.   strength 5/5.  Normal tone and bulk.  No abnormal movements.    Sensation: Intact to light touch, proprioception, temperature, vibration, and pinprick.   Coordination: No dysmetria on finger-to-nose (if performed with one eye closed; if both eyes open, pt is unable to hit the target) and heel-to-shin.  No dysdiadochokinesia.  Reflexes: 1+ in bilateral UE/LE, downgoing toes bilaterally. (-) Paige.  Gait: Narrow and steady. No ataxia.      Labs:  CBC Full  -  ( 16 Dec 2024 13:28 )  WBC Count : 11.74 K/uL  RBC Count : 5.43 M/uL  Hemoglobin : 14.8 g/dL  Hematocrit : 46.3 %  Platelet Count - Automated : 288 K/uL  Mean Cell Volume : 85.3 fl  Mean Cell Hemoglobin : 27.3 pg  Mean Cell Hemoglobin Concentration : 32.0 g/dL  Auto Neutrophil # : 8.87 K/uL  Auto Lymphocyte # : 1.70 K/uL  Auto Monocyte # : 0.87 K/uL  Auto Eosinophil # : 0.17 K/uL  Auto Basophil # : 0.05 K/uL  Auto Neutrophil % : 75.6 %  Auto Lymphocyte % : 14.5 %  Auto Monocyte % : 7.4 %  Auto Eosinophil % : 1.4 %  Auto Basophil % : 0.4 %    12-16    135  |  99  |  15  ----------------------------<  161[H]  4.0   |  27  |  0.67    Ca    8.9      16 Dec 2024 13:28          Urinalysis Basic - ( 16 Dec 2024 13:28 )    Color: x / Appearance: x / SG: x / pH: x  Gluc: 161 mg/dL / Ketone: x  / Bili: x / Urobili: x   Blood: x / Protein: x / Nitrite: x   Leuk Esterase: x / RBC: x / WBC x   Sq Epi: x / Non Sq Epi: x / Bacteria: x        Assessment:  This is a 59y Male w/ h/o     Plan: Neurology Progress Note    Interval History:    The patient was encountered sitting up in bed, in NAD, but endorsing 9/10 headache lateralizing to the right side behind the eye. He also endorses continued diplopia. The patient states that if he covers one eye or the other, on forward gaze his vision is normal. However, if he tries to see with both eyes simultaneously, his vision is double. On EOM exam, rightward gaze elicits the diplopia most prominently. In terms of the headache, the patient states that it started on the day of his injection (10 days prior), and is usually 8-9/10 in severity. He does note that it is intermittent, and it is less painful upon waking in the morning, as is the diplopia. Otherwise, the patient denies any new or acute complaints; specifically, he denies nausea, vomiting, palpitations, chills, or sweats. He also denies recent travel and sick contacts.       PAST MEDICAL & SURGICAL HISTORY:  HTN (hypertension)    HLD (hyperlipidemia)    DM (diabetes mellitus)  Type II, Hg A1C 12.2 ( 8/18/17) , does not check sugar at home regularly    CAD (coronary artery disease)  severe    Obesity  morbid, BMI 40    Cornea conical, bilateral  wears corrective lenses    Stented coronary artery  x 3 ( 2012)    History of MI (myocardial infarction)  2012    Bilateral carotid artery disease    Sleep apnea  never evaluated, STOP BANG 8, high risk    History of Bell's palsy  2015    History of blood clots  s/p Bell's palsy, h/o bloot clot, patient reports being treated 2015    History of eczema    Fracture of arm  1987, left radial fracture/operation/hardware    History of coronary artery stent placement  x3 (2012)    S/P CABG x 4            Medications:  acetaminophen     Tablet .. 650 milliGRAM(s) Oral every 6 hours PRN  aspirin enteric coated 81 milliGRAM(s) Oral daily  clopidogrel Tablet 75 milliGRAM(s) Oral daily  dextrose 5%. 1000 milliLiter(s) IV Continuous <Continuous>  dextrose 5%. 1000 milliLiter(s) IV Continuous <Continuous>  dextrose 50% Injectable 25 Gram(s) IV Push once  dextrose 50% Injectable 12.5 Gram(s) IV Push once  dextrose 50% Injectable 25 Gram(s) IV Push once  dextrose Oral Gel 15 Gram(s) Oral once PRN  enoxaparin Injectable 40 milliGRAM(s) SubCutaneous every 12 hours  gabapentin 300 milliGRAM(s) Oral daily  glucagon  Injectable 1 milliGRAM(s) IntraMuscular once  influenza   Vaccine 0.5 milliLiter(s) IntraMuscular once  insulin glargine Injectable (LANTUS) 34 Unit(s) SubCutaneous every morning  insulin glargine Injectable (LANTUS) 34 Unit(s) SubCutaneous at bedtime  insulin lispro (ADMELOG) corrective regimen sliding scale   SubCutaneous Before meals and at bedtime  insulin lispro Injectable (ADMELOG) 22 Unit(s) SubCutaneous three times a day before meals  isosorbide   mononitrate ER Tablet (IMDUR) 120 milliGRAM(s) Oral daily  metoprolol succinate  milliGRAM(s) Oral daily  pantoprazole    Tablet 40 milliGRAM(s) Oral before breakfast  valsartan 320 milliGRAM(s) Oral daily      Vital Signs Last 24 Hrs  T(C): 36.4 (17 Dec 2024 06:30), Max: 37.2 (16 Dec 2024 18:00)  T(F): 97.5 (17 Dec 2024 06:30), Max: 98.9 (16 Dec 2024 18:00)  HR: 57 (17 Dec 2024 06:30) (57 - 92)  BP: 110/66 (17 Dec 2024 06:30) (110/66 - 191/107)  BP(mean): 81 (17 Dec 2024 06:30) (81 - 111)  RR: 18 (17 Dec 2024 06:30) (16 - 18)  SpO2: 94% (17 Dec 2024 06:30) (94% - 97%)    Parameters below as of 17 Dec 2024 06:30  Patient On (Oxygen Delivery Method): room air        Neurological Exam:   Mental status: Awake, alert and oriented x3.  Recent and remote memory intact.  Naming, repetition and comprehension intact.  Attention/concentration intact.  No dysarthria, no aphasia.     Cranial nerves: Pupils equally round and reactive to light, visual fields decreased in the temporal fields superiorly and inferiorly, no nystagmus, decreased abduction of the R eye with increased diplopia on rightward gaze. OD 20/70; OS 20/50. Face symmetric, hearing intact to finger rub, palate elevation symmetric, tongue was midline.  Motor:  MRC grading 5/5 b/l UE/LE.   strength 5/5.  Normal tone and bulk.  No abnormal movements.    Sensation: Intact to light touch, proprioception, temperature, vibration, and pinprick.   Coordination: No dysmetria on finger-to-nose (if performed with one eye closed; if both eyes open, pt is unable to hit the target) and heel-to-shin.  No dysdiadochokinesia.  Reflexes: 1+ in bilateral UE/LE, downgoing toes bilaterally. (-) Paige.  Gait: Narrow and steady. No ataxia.      Labs:  CBC Full  -  ( 16 Dec 2024 13:28 )  WBC Count : 11.74 K/uL  RBC Count : 5.43 M/uL  Hemoglobin : 14.8 g/dL  Hematocrit : 46.3 %  Platelet Count - Automated : 288 K/uL  Mean Cell Volume : 85.3 fl  Mean Cell Hemoglobin : 27.3 pg  Mean Cell Hemoglobin Concentration : 32.0 g/dL  Auto Neutrophil # : 8.87 K/uL  Auto Lymphocyte # : 1.70 K/uL  Auto Monocyte # : 0.87 K/uL  Auto Eosinophil # : 0.17 K/uL  Auto Basophil # : 0.05 K/uL  Auto Neutrophil % : 75.6 %  Auto Lymphocyte % : 14.5 %  Auto Monocyte % : 7.4 %  Auto Eosinophil % : 1.4 %  Auto Basophil % : 0.4 %    12-16    135  |  99  |  15  ----------------------------<  161[H]  4.0   |  27  |  0.67    Ca    8.9      16 Dec 2024 13:28          Urinalysis Basic - ( 16 Dec 2024 13:28 )    Color: x / Appearance: x / SG: x / pH: x  Gluc: 161 mg/dL / Ketone: x  / Bili: x / Urobili: x   Blood: x / Protein: x / Nitrite: x   Leuk Esterase: x / RBC: x / WBC x   Sq Epi: x / Non Sq Epi: x / Bacteria: x        Assessment:  This is a 59y Male w/ h/o     Plan: Neurology Progress Note    Interval History:    The patient was encountered sitting up in bed, in NAD, but endorsing 9/10 headache lateralizing to the right side behind the eye. He also endorses continued diplopia. The patient states that if he covers one eye or the other, on forward gaze his vision is normal. However, if he tries to see with both eyes simultaneously, his vision is double. On EOM exam, rightward gaze elicits the diplopia most prominently. In terms of the headache, the patient states that it started on the day of his injection (10 days prior), and is usually 8-9/10 in severity. He does note that it is intermittent, and it is less painful upon waking in the morning, as is the diplopia. Otherwise, the patient denies any new or acute complaints; specifically, he denies nausea, vomiting, palpitations, chills, or sweats. He also denies recent travel and sick contacts.       PAST MEDICAL & SURGICAL HISTORY:  HTN (hypertension)    HLD (hyperlipidemia)    DM (diabetes mellitus)  Type II, Hg A1C 12.2 ( 8/18/17) , does not check sugar at home regularly    CAD (coronary artery disease)  severe    Obesity  morbid, BMI 40    Cornea conical, bilateral  wears corrective lenses    Stented coronary artery  x 3 ( 2012)    History of MI (myocardial infarction)  2012    Bilateral carotid artery disease    Sleep apnea  never evaluated, STOP BANG 8, high risk    History of Bell's palsy  2015    History of blood clots  s/p Bell's palsy, h/o bloot clot, patient reports being treated 2015    History of eczema    Fracture of arm  1987, left radial fracture/operation/hardware    History of coronary artery stent placement  x3 (2012)    S/P CABG x 4            Medications:  acetaminophen     Tablet .. 650 milliGRAM(s) Oral every 6 hours PRN  aspirin enteric coated 81 milliGRAM(s) Oral daily  clopidogrel Tablet 75 milliGRAM(s) Oral daily  dextrose 5%. 1000 milliLiter(s) IV Continuous <Continuous>  dextrose 5%. 1000 milliLiter(s) IV Continuous <Continuous>  dextrose 50% Injectable 25 Gram(s) IV Push once  dextrose 50% Injectable 12.5 Gram(s) IV Push once  dextrose 50% Injectable 25 Gram(s) IV Push once  dextrose Oral Gel 15 Gram(s) Oral once PRN  enoxaparin Injectable 40 milliGRAM(s) SubCutaneous every 12 hours  gabapentin 300 milliGRAM(s) Oral daily  glucagon  Injectable 1 milliGRAM(s) IntraMuscular once  influenza   Vaccine 0.5 milliLiter(s) IntraMuscular once  insulin glargine Injectable (LANTUS) 34 Unit(s) SubCutaneous every morning  insulin glargine Injectable (LANTUS) 34 Unit(s) SubCutaneous at bedtime  insulin lispro (ADMELOG) corrective regimen sliding scale   SubCutaneous Before meals and at bedtime  insulin lispro Injectable (ADMELOG) 22 Unit(s) SubCutaneous three times a day before meals  isosorbide   mononitrate ER Tablet (IMDUR) 120 milliGRAM(s) Oral daily  metoprolol succinate  milliGRAM(s) Oral daily  pantoprazole    Tablet 40 milliGRAM(s) Oral before breakfast  valsartan 320 milliGRAM(s) Oral daily      Vital Signs Last 24 Hrs  T(C): 36.4 (17 Dec 2024 06:30), Max: 37.2 (16 Dec 2024 18:00)  T(F): 97.5 (17 Dec 2024 06:30), Max: 98.9 (16 Dec 2024 18:00)  HR: 57 (17 Dec 2024 06:30) (57 - 92)  BP: 110/66 (17 Dec 2024 06:30) (110/66 - 191/107)  BP(mean): 81 (17 Dec 2024 06:30) (81 - 111)  RR: 18 (17 Dec 2024 06:30) (16 - 18)  SpO2: 94% (17 Dec 2024 06:30) (94% - 97%)    Parameters below as of 17 Dec 2024 06:30  Patient On (Oxygen Delivery Method): room air        Neurological Exam:   Mental status: Awake, alert and oriented x3.  Recent and remote memory intact.  Naming, repetition and comprehension intact.  Attention/concentration intact.  No dysarthria, no aphasia.     Cranial nerves: Pupils equally round and reactive to light, visual fields decreased in the temporal fields superiorly and inferiorly, no nystagmus, decreased abduction of the R eye with increased diplopia on rightward gaze. OD 20/70; OS 20/50. Face symmetric, hearing intact to finger rub, palate elevation symmetric, tongue was midline.  Motor:  MRC grading 5/5 b/l UE/LE.   strength 5/5.  Normal tone and bulk.  No abnormal movements.    Sensation: Intact to light touch, proprioception, temperature, vibration, and pinprick.   Coordination: No dysmetria on finger-to-nose (if performed with one eye closed; if both eyes open, pt is unable to hit the target) and heel-to-shin.  No dysdiadochokinesia.  Reflexes: 1+ in bilateral UE/LE, downgoing toes bilaterally. (-) Paige.  Gait: Ambulation deferred d/t pt feeling too unsteady from diplopia. Pt was able to stand from a seated position with both feet together.     Labs:  CBC Full  -  ( 16 Dec 2024 13:28 )  WBC Count : 11.74 K/uL  RBC Count : 5.43 M/uL  Hemoglobin : 14.8 g/dL  Hematocrit : 46.3 %  Platelet Count - Automated : 288 K/uL  Mean Cell Volume : 85.3 fl  Mean Cell Hemoglobin : 27.3 pg  Mean Cell Hemoglobin Concentration : 32.0 g/dL  Auto Neutrophil # : 8.87 K/uL  Auto Lymphocyte # : 1.70 K/uL  Auto Monocyte # : 0.87 K/uL  Auto Eosinophil # : 0.17 K/uL  Auto Basophil # : 0.05 K/uL  Auto Neutrophil % : 75.6 %  Auto Lymphocyte % : 14.5 %  Auto Monocyte % : 7.4 %  Auto Eosinophil % : 1.4 %  Auto Basophil % : 0.4 %    12-16    135  |  99  |  15  ----------------------------<  161[H]  4.0   |  27  |  0.67    Ca    8.9      16 Dec 2024 13:28          Urinalysis Basic - ( 16 Dec 2024 13:28 )    Color: x / Appearance: x / SG: x / pH: x  Gluc: 161 mg/dL / Ketone: x  / Bili: x / Urobili: x   Blood: x / Protein: x / Nitrite: x   Leuk Esterase: x / RBC: x / WBC x   Sq Epi: x / Non Sq Epi: x / Bacteria: x        Assessment:  This is a 59y Male w/ h/o     Plan:

## 2024-12-17 NOTE — PHYSICAL THERAPY INITIAL EVALUATION ADULT - IMPAIRMENTS CONTRIBUTING TO GAIT DEVIATIONS, PT EVAL
antalgic gait patter with decreased single limb stance on RLE due to chronic R hip pain/impaired balance

## 2024-12-17 NOTE — OCCUPATIONAL THERAPY INITIAL EVALUATION ADULT - ADDITIONAL COMMENTS
Pt lives w/ his mother in an elevator + ramp accessible apartment building. He reports being independent w/ ADLs/IADLs and mobility tasks, use of no AD. Pt states that he is able to walk half a block before feeling fatigue 2/2 R hip and b/l feet pain. DME include: cane, rollator, RW & 3 grab bars in shower at home.

## 2024-12-17 NOTE — OCCUPATIONAL THERAPY INITIAL EVALUATION ADULT - FINE MOTOR COORDINATION, LEFT HAND, DIADOCHOKINESIS SKILLS, OT EVAL
Med:  Gabapentin 800 mg   -      LOV:  2/24/20    FUV:  5/22/20    Refill authorized per protocol.      
normal performance

## 2024-12-18 ENCOUNTER — TRANSCRIPTION ENCOUNTER (OUTPATIENT)
Age: 59
End: 2024-12-18

## 2024-12-18 VITALS
TEMPERATURE: 99 F | DIASTOLIC BLOOD PRESSURE: 59 MMHG | SYSTOLIC BLOOD PRESSURE: 113 MMHG | RESPIRATION RATE: 18 BRPM | OXYGEN SATURATION: 94 % | HEART RATE: 63 BPM

## 2024-12-18 LAB
ALBUMIN SERPL ELPH-MCNC: 3 G/DL — LOW (ref 3.3–5)
ALP SERPL-CCNC: 78 U/L — SIGNIFICANT CHANGE UP (ref 40–120)
ALT FLD-CCNC: 7 U/L — LOW (ref 10–45)
ANION GAP SERPL CALC-SCNC: 9 MMOL/L — SIGNIFICANT CHANGE UP (ref 5–17)
AST SERPL-CCNC: 11 U/L — SIGNIFICANT CHANGE UP (ref 10–40)
BASOPHILS # BLD AUTO: 0.05 K/UL — SIGNIFICANT CHANGE UP (ref 0–0.2)
BASOPHILS NFR BLD AUTO: 0.4 % — SIGNIFICANT CHANGE UP (ref 0–2)
BILIRUB SERPL-MCNC: 0.6 MG/DL — SIGNIFICANT CHANGE UP (ref 0.2–1.2)
BUN SERPL-MCNC: 21 MG/DL — SIGNIFICANT CHANGE UP (ref 7–23)
CALCIUM SERPL-MCNC: 8.4 MG/DL — SIGNIFICANT CHANGE UP (ref 8.4–10.5)
CHLORIDE SERPL-SCNC: 101 MMOL/L — SIGNIFICANT CHANGE UP (ref 96–108)
CO2 SERPL-SCNC: 22 MMOL/L — SIGNIFICANT CHANGE UP (ref 22–31)
CREAT SERPL-MCNC: 0.77 MG/DL — SIGNIFICANT CHANGE UP (ref 0.5–1.3)
EGFR: 104 ML/MIN/1.73M2 — SIGNIFICANT CHANGE UP
EOSINOPHIL # BLD AUTO: 0.21 K/UL — SIGNIFICANT CHANGE UP (ref 0–0.5)
EOSINOPHIL NFR BLD AUTO: 1.9 % — SIGNIFICANT CHANGE UP (ref 0–6)
GLUCOSE SERPL-MCNC: 127 MG/DL — HIGH (ref 70–99)
HCT VFR BLD CALC: 40.9 % — SIGNIFICANT CHANGE UP (ref 39–50)
HGB BLD-MCNC: 13.1 G/DL — SIGNIFICANT CHANGE UP (ref 13–17)
IMM GRANULOCYTES NFR BLD AUTO: 0.4 % — SIGNIFICANT CHANGE UP (ref 0–0.9)
LYMPHOCYTES # BLD AUTO: 1.72 K/UL — SIGNIFICANT CHANGE UP (ref 1–3.3)
LYMPHOCYTES # BLD AUTO: 15.4 % — SIGNIFICANT CHANGE UP (ref 13–44)
MAGNESIUM SERPL-MCNC: 1.8 MG/DL — SIGNIFICANT CHANGE UP (ref 1.6–2.6)
MCHC RBC-ENTMCNC: 27.5 PG — SIGNIFICANT CHANGE UP (ref 27–34)
MCHC RBC-ENTMCNC: 32 G/DL — SIGNIFICANT CHANGE UP (ref 32–36)
MCV RBC AUTO: 85.7 FL — SIGNIFICANT CHANGE UP (ref 80–100)
MONOCYTES # BLD AUTO: 1.1 K/UL — HIGH (ref 0–0.9)
MONOCYTES NFR BLD AUTO: 9.8 % — SIGNIFICANT CHANGE UP (ref 2–14)
NEUTROPHILS # BLD AUTO: 8.04 K/UL — HIGH (ref 1.8–7.4)
NEUTROPHILS NFR BLD AUTO: 72.1 % — SIGNIFICANT CHANGE UP (ref 43–77)
NRBC # BLD: 0 /100 WBCS — SIGNIFICANT CHANGE UP (ref 0–0)
PHOSPHATE SERPL-MCNC: 3.7 MG/DL — SIGNIFICANT CHANGE UP (ref 2.5–4.5)
PLATELET # BLD AUTO: 252 K/UL — SIGNIFICANT CHANGE UP (ref 150–400)
POTASSIUM SERPL-MCNC: 4 MMOL/L — SIGNIFICANT CHANGE UP (ref 3.5–5.3)
POTASSIUM SERPL-SCNC: 4 MMOL/L — SIGNIFICANT CHANGE UP (ref 3.5–5.3)
PROT SERPL-MCNC: 6.9 G/DL — SIGNIFICANT CHANGE UP (ref 6–8.3)
RBC # BLD: 4.77 M/UL — SIGNIFICANT CHANGE UP (ref 4.2–5.8)
RBC # FLD: 15.1 % — HIGH (ref 10.3–14.5)
SODIUM SERPL-SCNC: 132 MMOL/L — LOW (ref 135–145)
WBC # BLD: 11.17 K/UL — HIGH (ref 3.8–10.5)
WBC # FLD AUTO: 11.17 K/UL — HIGH (ref 3.8–10.5)

## 2024-12-18 PROCEDURE — 80053 COMPREHEN METABOLIC PANEL: CPT

## 2024-12-18 PROCEDURE — 86140 C-REACTIVE PROTEIN: CPT

## 2024-12-18 PROCEDURE — 85652 RBC SED RATE AUTOMATED: CPT

## 2024-12-18 PROCEDURE — 96374 THER/PROPH/DIAG INJ IV PUSH: CPT

## 2024-12-18 PROCEDURE — 83615 LACTATE (LD) (LDH) ENZYME: CPT

## 2024-12-18 PROCEDURE — 99232 SBSQ HOSP IP/OBS MODERATE 35: CPT

## 2024-12-18 PROCEDURE — 80061 LIPID PANEL: CPT

## 2024-12-18 PROCEDURE — 84100 ASSAY OF PHOSPHORUS: CPT

## 2024-12-18 PROCEDURE — 84165 PROTEIN E-PHORESIS SERUM: CPT

## 2024-12-18 PROCEDURE — 70498 CT ANGIOGRAPHY NECK: CPT | Mod: MC

## 2024-12-18 PROCEDURE — 99223 1ST HOSP IP/OBS HIGH 75: CPT

## 2024-12-18 PROCEDURE — 85025 COMPLETE CBC W/AUTO DIFF WBC: CPT

## 2024-12-18 PROCEDURE — 71045 X-RAY EXAM CHEST 1 VIEW: CPT

## 2024-12-18 PROCEDURE — 82784 ASSAY IGA/IGD/IGG/IGM EACH: CPT

## 2024-12-18 PROCEDURE — 97165 OT EVAL LOW COMPLEX 30 MIN: CPT

## 2024-12-18 PROCEDURE — 80048 BASIC METABOLIC PNL TOTAL CA: CPT

## 2024-12-18 PROCEDURE — 83521 IG LIGHT CHAINS FREE EACH: CPT

## 2024-12-18 PROCEDURE — 71045 X-RAY EXAM CHEST 1 VIEW: CPT | Mod: 26

## 2024-12-18 PROCEDURE — 86334 IMMUNOFIX E-PHORESIS SERUM: CPT

## 2024-12-18 PROCEDURE — 82962 GLUCOSE BLOOD TEST: CPT

## 2024-12-18 PROCEDURE — 70496 CT ANGIOGRAPHY HEAD: CPT | Mod: MC

## 2024-12-18 PROCEDURE — 97161 PT EVAL LOW COMPLEX 20 MIN: CPT

## 2024-12-18 PROCEDURE — 83036 HEMOGLOBIN GLYCOSYLATED A1C: CPT

## 2024-12-18 PROCEDURE — 70450 CT HEAD/BRAIN W/O DYE: CPT | Mod: MC

## 2024-12-18 PROCEDURE — 99285 EMERGENCY DEPT VISIT HI MDM: CPT | Mod: 25

## 2024-12-18 PROCEDURE — 84155 ASSAY OF PROTEIN SERUM: CPT

## 2024-12-18 PROCEDURE — 84443 ASSAY THYROID STIM HORMONE: CPT

## 2024-12-18 PROCEDURE — 83735 ASSAY OF MAGNESIUM: CPT

## 2024-12-18 PROCEDURE — 36415 COLL VENOUS BLD VENIPUNCTURE: CPT

## 2024-12-18 PROCEDURE — 96375 TX/PRO/DX INJ NEW DRUG ADDON: CPT

## 2024-12-18 RX ORDER — ACETAMINOPHEN, DIPHENHYDRAMINE HCL, PHENYLEPHRINE HCL 325; 25; 5 MG/1; MG/1; MG/1
3 TABLET ORAL AT BEDTIME
Refills: 0 | Status: DISCONTINUED | OUTPATIENT
Start: 2024-12-18 | End: 2024-12-18

## 2024-12-18 RX ADMIN — Medication 120 MILLIGRAM(S): at 12:43

## 2024-12-18 RX ADMIN — PANTOPRAZOLE SODIUM 40 MILLIGRAM(S): 40 TABLET, DELAYED RELEASE ORAL at 07:39

## 2024-12-18 RX ADMIN — Medication 2: at 12:44

## 2024-12-18 RX ADMIN — GABAPENTIN 300 MILLIGRAM(S): 300 CAPSULE ORAL at 12:43

## 2024-12-18 RX ADMIN — ACETAMINOPHEN, DIPHENHYDRAMINE HCL, PHENYLEPHRINE HCL 3 MILLIGRAM(S): 325; 25; 5 TABLET ORAL at 02:15

## 2024-12-18 RX ADMIN — Medication 22 UNIT(S): at 12:45

## 2024-12-18 RX ADMIN — Medication 81 MILLIGRAM(S): at 12:43

## 2024-12-18 RX ADMIN — CLOPIDOGREL 75 MILLIGRAM(S): 75 TABLET, FILM COATED ORAL at 12:43

## 2024-12-18 NOTE — DISCHARGE NOTE PROVIDER - NSDCCPCAREPLAN_GEN_ALL_CORE_FT
PRINCIPAL DISCHARGE DIAGNOSIS  Diagnosis: Lateral rectus palsy, right  Assessment and Plan of Treatment: You have been diagnosed with palsy of the right abducens nerve, likely due to your diabetes mellitus. Sixth nerve palsy is a condition where damage to a specific nerve disrupts eye movement and alignment. It’s usually one-sided (unilateral) and affects one eye only, but it can sometimes affect both eyes (bilateral). It’s also known as abducens (pronounced “ab-DEW-sens”) nerve palsy.  Your 12 cranial nerves connect to your brain directly and come in pairs, each having a left and right side. Your sixth cranial nerve pair, also known as the abducens nerve, connects to the lateral rectus muscle on each of your eyes. Your lateral rectus muscles pull outward. They help your left eye look left and your right eye look right. Sixth nerve palsy disrupts signals traveling through the nerve, causing weakness or paralysis in the lateral rectus muscle.  This condition will take some time to resolve, as the nerve needs to heal. In the meantime, you should pay careful attention to taking all of your medications as directed, following up with your PCP and endocrinologist, and improving your diet and exercise as tolerated. It is very important that you minimize your risk factors as best as you can to prevent this condition from getting worse or happening again.  PLEASE RETURN TO THE HOSPITAL IMMEDIATELY IF: You experience palpitations, sudden loss of vision, loss of consciousness, or sudden onset weakness.     PRINCIPAL DISCHARGE DIAGNOSIS  Diagnosis: Lateral rectus palsy, right  Assessment and Plan of Treatment: You have been diagnosed with palsy of the right abducens nerve, likely due to your diabetes mellitus. Sixth nerve palsy is a condition where damage to a specific nerve disrupts eye movement and alignment. It’s usually one-sided (unilateral) and affects one eye only, but it can sometimes affect both eyes (bilateral). It’s also known as abducens (pronounced “ab-DEW-sens”) nerve palsy.  Your 12 cranial nerves connect to your brain directly and come in pairs, each having a left and right side. Your sixth cranial nerve pair, also known as the abducens nerve, connects to the lateral rectus muscle on each of your eyes. Your lateral rectus muscles pull outward. They help your left eye look left and your right eye look right. Sixth nerve palsy disrupts signals traveling through the nerve, causing weakness or paralysis in the lateral rectus muscle.  This condition will take some time to resolve, as the nerve needs to heal. In the meantime, you should pay careful attention to taking all of your medications as directed, following up with your PCP and endocrinologist, and improving your diet and exercise as tolerated. It is very important that you minimize your risk factors as best as you can to prevent this condition from getting worse or happening again.  You have decided to leave the hospital against medical advice. Our advice was for you to remain hospitalized for full workup which included MRI brain, but you are leaving before having this test performed. The risks of leaving against medical advice were explained to you, which include further deficits and even death.   PLEASE RETURN TO THE HOSPITAL IMMEDIATELY IF: You experience palpitations, sudden loss of vision, loss of consciousness, or sudden onset weakness.

## 2024-12-18 NOTE — DISCHARGE NOTE NURSING/CASE MANAGEMENT/SOCIAL WORK - NSDCPEFALRISK_GEN_ALL_CORE
For information on Fall & Injury Prevention, visit: [email or web address] OR  [email or web address] OR  https://www.cdc.gov/steadi/patient.html

## 2024-12-18 NOTE — PROGRESS NOTE ADULT - REASON FOR ADMISSION
double vision for 10 days after retina injections

## 2024-12-18 NOTE — PROGRESS NOTE ADULT - ASSESSMENT
59 year old male with history of HTN, Diabetes, Bell's palsy, HLD, CAD s/p CABG, diabetic retinopathy undergoing retinal injections b/l outpatient, presenting with dizziness, Binocular Diplopia, and R frontal/temporal HA and pressure behind his eye (throbbing, rated 9/10- despite tylenol) since his retina  injection 10 days ago. Admitted for partial/full diabetic nerve palsy, encephalitis, meningitis, stroke workup.  Patient cannot fully abduct R eye; most likely sixth nerve palsy 2/2 to poorly controlled diabetes. Despite the time course, less likely injury from injection, as the site of injury is the opposite side from the injection, and the mechanism required to injure CN6 would be unlikely to result from this type of injection.    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  - f/u MRI w/o contrast to r/o stroke  - f/u UA and UC to r/o UTI   - Repeat CBC tomorrow to trend WBC--> leukocytosis resolved 12/17  - eye patch (OT)  - f/u PT/OT/Physiatry    #DM  - c/w home Lantus - 34 BID  - c/w home Novolog - 22 TID  - mISS  - consistent carb diet       #Neuropathy  - c/w home gabapentin 300mg qD    #CAD s/p CABG  #HTN  - c/w home ASA & Plavix  - c/w home metoprolol succinate 100mg qD (evening)  - c/w home isosorbide mononitrate 125mg qD (evening)  - c/w home valsartan 320mg qD (evening)    #HLD  - takes Repatha q 2 weeks (already taken recently)    #Prophylaxis:  F: None  E:  K>4, Mg>2, Phos >3  N: consistent carb  DVT PPx: lovenox  GI PPx: home pantoprazole  Dispo: TBD- [facility]  Code: FULL CODE 59 year old male with history of HTN, Diabetes, Bell's palsy, HLD, CAD s/p CABG, diabetic retinopathy undergoing retinal injections b/l outpatient, presenting with dizziness, Binocular Diplopia, and R frontal/temporal HA and pressure behind his eye (throbbing, rated 9/10- despite tylenol) since his retina  injection 10 days ago. Admitted for partial/full diabetic nerve palsy, encephalitis, meningitis, stroke workup.  Patient cannot fully abduct R eye; most likely sixth nerve palsy 2/2 to poorly controlled diabetes. Despite the time course, less likely injury from injection, as the site of injury is the opposite side from the injection, and the mechanism required to injure CN6 would be unlikely to result from this type of injection.    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  - f/u MRI w/o contrast to r/o stroke  - eye patch (OT)  - f/u PT/OT/Physiatry --> Home PT/OT    #DM  - c/w home Lantus - 34 BID  - c/w home Novolog - 22 TID  - mISS  - consistent carb diet       #Neuropathy  - c/w home gabapentin 300mg qD    #CAD s/p CABG  #HTN  - c/w home ASA & Plavix  - c/w home metoprolol succinate 100mg qD (evening)  - c/w home isosorbide mononitrate 125mg qD (evening)  - c/w home valsartan 320mg qD (evening)    #HLD  - takes Repatha q 2 weeks (already taken recently)    #Prophylaxis:  F: None  E:  K>4, Mg>2, Phos >3  N: consistent carb  DVT PPx: lovenox  GI PPx: home pantoprazole  Dispo: TBD- [facility]  Code: FULL CODE

## 2024-12-18 NOTE — PROGRESS NOTE ADULT - SUBJECTIVE AND OBJECTIVE BOX
Neurology Progress Note    Interval History:          PAST MEDICAL & SURGICAL HISTORY:  HTN (hypertension)    HLD (hyperlipidemia)    DM (diabetes mellitus)  Type II, Hg A1C 12.2 ( 8/18/17) , does not check sugar at home regularly    CAD (coronary artery disease)  severe    Obesity  morbid, BMI 40    Cornea conical, bilateral  wears corrective lenses    Stented coronary artery  x 3 ( 2012)    History of MI (myocardial infarction)  2012    Bilateral carotid artery disease    Sleep apnea  never evaluated, STOP BANG 8, high risk    History of Bell's palsy  2015    History of blood clots  s/p Bell's palsy, h/o bloot clot, patient reports being treated 2015    History of eczema    Fracture of arm  1987, left radial fracture/operation/hardware    History of coronary artery stent placement  x3 (2012)    S/P CABG x 4            Medications:  acetaminophen     Tablet .. 650 milliGRAM(s) Oral every 6 hours PRN  aspirin enteric coated 81 milliGRAM(s) Oral daily  clopidogrel Tablet 75 milliGRAM(s) Oral daily  dextrose 5%. 1000 milliLiter(s) IV Continuous <Continuous>  dextrose 5%. 1000 milliLiter(s) IV Continuous <Continuous>  dextrose 50% Injectable 25 Gram(s) IV Push once  dextrose 50% Injectable 12.5 Gram(s) IV Push once  dextrose 50% Injectable 25 Gram(s) IV Push once  dextrose Oral Gel 15 Gram(s) Oral once PRN  enoxaparin Injectable 40 milliGRAM(s) SubCutaneous every 12 hours  gabapentin 300 milliGRAM(s) Oral daily  glucagon  Injectable 1 milliGRAM(s) IntraMuscular once  influenza   Vaccine 0.5 milliLiter(s) IntraMuscular once  insulin glargine Injectable (LANTUS) 34 Unit(s) SubCutaneous every morning  insulin glargine Injectable (LANTUS) 34 Unit(s) SubCutaneous at bedtime  insulin lispro (ADMELOG) corrective regimen sliding scale   SubCutaneous Before meals and at bedtime  insulin lispro Injectable (ADMELOG) 22 Unit(s) SubCutaneous three times a day before meals  isosorbide   mononitrate ER Tablet (IMDUR) 120 milliGRAM(s) Oral daily  melatonin 3 milliGRAM(s) Oral at bedtime PRN  metoprolol succinate  milliGRAM(s) Oral daily  pantoprazole    Tablet 40 milliGRAM(s) Oral before breakfast  valsartan 320 milliGRAM(s) Oral daily      Vital Signs Last 24 Hrs  T(C): 37.1 (18 Dec 2024 05:25), Max: 37.3 (17 Dec 2024 20:39)  T(F): 98.7 (18 Dec 2024 05:25), Max: 99.2 (17 Dec 2024 20:39)  HR: 63 (18 Dec 2024 05:25) (63 - 67)  BP: 113/59 (18 Dec 2024 05:25) (113/59 - 153/75)  BP(mean): 77 (18 Dec 2024 05:25) (65 - 77)  RR: 18 (18 Dec 2024 05:25) (18 - 18)  SpO2: 94% (18 Dec 2024 05:25) (94% - 95%)    Parameters below as of 18 Dec 2024 05:25  Patient On (Oxygen Delivery Method): room air        Neurological Exam:   Mental status: Awake, alert and oriented x3.  Recent and remote memory intact.  Naming, repetition and comprehension intact.  Attention/concentration intact.  No dysarthria, no aphasia.  Fund of knowledge appropriate.    Cranial nerves: Pupils equally round and reactive to light, visual fields full, no nystagmus, extraocular muscles intact, V1 through V3 intact bilaterally and symmetric, face symmetric, hearing intact to finger rub, palate elevation symmetric, tongue was midline.  Motor:  MRC grading 5/5 b/l UE/LE.   strength 5/5.  Normal tone and bulk.  No abnormal movements.    Sensation: Intact to light touch, proprioception, and pinprick.   Coordination: No dysmetria on finger-to-nose and heel-to-shin.  No dysdiadokinesia.  Reflexes: 2+ in bilateral UE/LE, downgoing toes bilaterally. (-) Paige.  Gait: Narrow and steady. No ataxia.  Romberg negative    Labs:  CBC Full  -  ( 17 Dec 2024 05:30 )  WBC Count : 10.19 K/uL  RBC Count : 5.01 M/uL  Hemoglobin : 13.7 g/dL  Hematocrit : 43.4 %  Platelet Count - Automated : 278 K/uL  Mean Cell Volume : 86.6 fl  Mean Cell Hemoglobin : 27.3 pg  Mean Cell Hemoglobin Concentration : 31.6 g/dL  Auto Neutrophil # : 7.09 K/uL  Auto Lymphocyte # : 1.98 K/uL  Auto Monocyte # : 0.79 K/uL  Auto Eosinophil # : 0.19 K/uL  Auto Basophil # : 0.04 K/uL  Auto Neutrophil % : 69.5 %  Auto Lymphocyte % : 19.4 %  Auto Monocyte % : 7.8 %  Auto Eosinophil % : 1.9 %  Auto Basophil % : 0.4 %    12-17    134[L]  |  100  |  19  ----------------------------<  162[H]  3.8   |  27  |  0.75    Ca    8.1[L]      17 Dec 2024 05:30  Phos  4.1     12-17  Mg     1.8     12-17    TPro  6.5  /  Alb  3.4  /  TBili  0.6  /  DBili  x   /  AST  12  /  ALT  7[L]  /  AlkPhos  76  12-17    LIVER FUNCTIONS - ( 17 Dec 2024 05:30 )  Alb: 3.4 g/dL / Pro: 6.5 g/dL / ALK PHOS: 76 U/L / ALT: 7 U/L / AST: 12 U/L / GGT: x             Urinalysis Basic - ( 17 Dec 2024 05:30 )    Color: x / Appearance: x / SG: x / pH: x  Gluc: 162 mg/dL / Ketone: x  / Bili: x / Urobili: x   Blood: x / Protein: x / Nitrite: x   Leuk Esterase: x / RBC: x / WBC x   Sq Epi: x / Non Sq Epi: x / Bacteria: x        Assessment:  This is a 59y Male w/ h/o     Plan: Neurology Progress Note    Interval History:    The patient was encountered sitting up in a chair, AAOx3, in no acute distress, but endorsing worsening of his headache to include b/l sides of the head. He notes that his vision is the same, but that he is having increased pain with lateral gaze b/l. He also reports that insulin causes him to have SOB and interrupts his sleep, so he does not want to take it anymore. The patient also notes that he is having cramping of the b/l LE, which improves when sitting up and worsens with lying in bed. He denies nausea, vomiting, dizziness, and numbness.       PAST MEDICAL & SURGICAL HISTORY:  HTN (hypertension)    HLD (hyperlipidemia)    DM (diabetes mellitus)  Type II, Hg A1C 12.2 ( 8/18/17) , does not check sugar at home regularly    CAD (coronary artery disease)  severe    Obesity  morbid, BMI 40    Cornea conical, bilateral  wears corrective lenses    Stented coronary artery  x 3 ( 2012)    History of MI (myocardial infarction)  2012    Bilateral carotid artery disease    Sleep apnea  never evaluated, STOP BANG 8, high risk    History of Bell's palsy  2015    History of blood clots  s/p Bell's palsy, h/o bloot clot, patient reports being treated 2015    History of eczema    Fracture of arm  1987, left radial fracture/operation/hardware    History of coronary artery stent placement  x3 (2012)    S/P CABG x 4            Medications:  acetaminophen     Tablet .. 650 milliGRAM(s) Oral every 6 hours PRN  aspirin enteric coated 81 milliGRAM(s) Oral daily  clopidogrel Tablet 75 milliGRAM(s) Oral daily  dextrose 5%. 1000 milliLiter(s) IV Continuous <Continuous>  dextrose 5%. 1000 milliLiter(s) IV Continuous <Continuous>  dextrose 50% Injectable 25 Gram(s) IV Push once  dextrose 50% Injectable 12.5 Gram(s) IV Push once  dextrose 50% Injectable 25 Gram(s) IV Push once  dextrose Oral Gel 15 Gram(s) Oral once PRN  enoxaparin Injectable 40 milliGRAM(s) SubCutaneous every 12 hours  gabapentin 300 milliGRAM(s) Oral daily  glucagon  Injectable 1 milliGRAM(s) IntraMuscular once  influenza   Vaccine 0.5 milliLiter(s) IntraMuscular once  insulin glargine Injectable (LANTUS) 34 Unit(s) SubCutaneous every morning  insulin glargine Injectable (LANTUS) 34 Unit(s) SubCutaneous at bedtime  insulin lispro (ADMELOG) corrective regimen sliding scale   SubCutaneous Before meals and at bedtime  insulin lispro Injectable (ADMELOG) 22 Unit(s) SubCutaneous three times a day before meals  isosorbide   mononitrate ER Tablet (IMDUR) 120 milliGRAM(s) Oral daily  melatonin 3 milliGRAM(s) Oral at bedtime PRN  metoprolol succinate  milliGRAM(s) Oral daily  pantoprazole    Tablet 40 milliGRAM(s) Oral before breakfast  valsartan 320 milliGRAM(s) Oral daily      Vital Signs Last 24 Hrs  T(C): 37.1 (18 Dec 2024 05:25), Max: 37.3 (17 Dec 2024 20:39)  T(F): 98.7 (18 Dec 2024 05:25), Max: 99.2 (17 Dec 2024 20:39)  HR: 63 (18 Dec 2024 05:25) (63 - 67)  BP: 113/59 (18 Dec 2024 05:25) (113/59 - 153/75)  BP(mean): 77 (18 Dec 2024 05:25) (65 - 77)  RR: 18 (18 Dec 2024 05:25) (18 - 18)  SpO2: 94% (18 Dec 2024 05:25) (94% - 95%)    Parameters below as of 18 Dec 2024 05:25  Patient On (Oxygen Delivery Method): room air        Neurological Exam:   Mental status: Awake, alert and oriented x3.  Recent and remote memory intact.  Naming, repetition and comprehension intact.  Attention/concentration intact.  No dysarthria, no aphasia.     Cranial nerves: Pupils equally round and reactive to light, visual fields decreased in the temporal fields superiorly and inferiorly, no nystagmus, decreased abduction of the R eye with increased diplopia on rightward gaze. OD 20/70; OS 20/50. Face symmetric, hearing intact to finger rub, palate elevation symmetric, tongue was midline.  Motor:  MRC grading 5/5 b/l UE/LE.   strength 5/5.  Normal tone and bulk.  No abnormal movements.    Sensation: Intact to light touch, proprioception, temperature, vibration, and pinprick.   Coordination: No dysmetria on finger-to-nose (if performed with one eye closed; if both eyes open, pt is unable to hit the target) and heel-to-shin.  No dysdiadochokinesia.  Reflexes: 1+ in bilateral UE/LE, downgoing toes bilaterally. (-) Paige.  Gait: Narrow-based and hesitant, but not ataxic. Romberg negative.    Labs:  CBC Full  -  ( 17 Dec 2024 05:30 )  WBC Count : 10.19 K/uL  RBC Count : 5.01 M/uL  Hemoglobin : 13.7 g/dL  Hematocrit : 43.4 %  Platelet Count - Automated : 278 K/uL  Mean Cell Volume : 86.6 fl  Mean Cell Hemoglobin : 27.3 pg  Mean Cell Hemoglobin Concentration : 31.6 g/dL  Auto Neutrophil # : 7.09 K/uL  Auto Lymphocyte # : 1.98 K/uL  Auto Monocyte # : 0.79 K/uL  Auto Eosinophil # : 0.19 K/uL  Auto Basophil # : 0.04 K/uL  Auto Neutrophil % : 69.5 %  Auto Lymphocyte % : 19.4 %  Auto Monocyte % : 7.8 %  Auto Eosinophil % : 1.9 %  Auto Basophil % : 0.4 %    12-17    134[L]  |  100  |  19  ----------------------------<  162[H]  3.8   |  27  |  0.75    Ca    8.1[L]      17 Dec 2024 05:30  Phos  4.1     12-17  Mg     1.8     12-17    TPro  6.5  /  Alb  3.4  /  TBili  0.6  /  DBili  x   /  AST  12  /  ALT  7[L]  /  AlkPhos  76  12-17    LIVER FUNCTIONS - ( 17 Dec 2024 05:30 )  Alb: 3.4 g/dL / Pro: 6.5 g/dL / ALK PHOS: 76 U/L / ALT: 7 U/L / AST: 12 U/L / GGT: x             Urinalysis Basic - ( 17 Dec 2024 05:30 )    Color: x / Appearance: x / SG: x / pH: x  Gluc: 162 mg/dL / Ketone: x  / Bili: x / Urobili: x   Blood: x / Protein: x / Nitrite: x   Leuk Esterase: x / RBC: x / WBC x   Sq Epi: x / Non Sq Epi: x / Bacteria: x        Assessment:  This is a 59y Male w/ h/o     Plan:

## 2024-12-18 NOTE — DISCHARGE NOTE PROVIDER - CARE PROVIDER_API CALL
Antonio Dempsey  Neurology  [address] [address]  Phone: [phone]  Fax: [phone]  Follow Up Time: 1 month

## 2024-12-18 NOTE — PROGRESS NOTE ADULT - SUBJECTIVE AND OBJECTIVE BOX
Patient is a 59y old  Male who presents with a chief complaint of double vision for 10 days after retina injections (18 Dec 2024 06:57)      INTERVAL HPI/OVERNIGHT EVENTS: offers no new complaints; current symptoms resolving    MEDICATIONS  (STANDING):  aspirin enteric coated 81 milliGRAM(s) Oral daily  clopidogrel Tablet 75 milliGRAM(s) Oral daily  dextrose 5%. 1000 milliLiter(s) (100 mL/Hr) IV Continuous <Continuous>  dextrose 5%. 1000 milliLiter(s) (50 mL/Hr) IV Continuous <Continuous>  dextrose 50% Injectable 25 Gram(s) IV Push once  dextrose 50% Injectable 12.5 Gram(s) IV Push once  dextrose 50% Injectable 25 Gram(s) IV Push once  enoxaparin Injectable 40 milliGRAM(s) SubCutaneous every 12 hours  gabapentin 300 milliGRAM(s) Oral daily  glucagon  Injectable 1 milliGRAM(s) IntraMuscular once  influenza   Vaccine 0.5 milliLiter(s) IntraMuscular once  insulin glargine Injectable (LANTUS) 34 Unit(s) SubCutaneous every morning  insulin glargine Injectable (LANTUS) 34 Unit(s) SubCutaneous at bedtime  insulin lispro (ADMELOG) corrective regimen sliding scale   SubCutaneous Before meals and at bedtime  insulin lispro Injectable (ADMELOG) 22 Unit(s) SubCutaneous three times a day before meals  isosorbide   mononitrate ER Tablet (IMDUR) 120 milliGRAM(s) Oral daily  metoprolol succinate  milliGRAM(s) Oral daily  pantoprazole    Tablet 40 milliGRAM(s) Oral before breakfast  valsartan 320 milliGRAM(s) Oral daily    MEDICATIONS  (PRN):  acetaminophen     Tablet .. 650 milliGRAM(s) Oral every 6 hours PRN Temp greater or equal to 38C (100.4F), Mild Pain (1 - 3)  dextrose Oral Gel 15 Gram(s) Oral once PRN Blood Glucose LESS THAN 70 milliGRAM(s)/deciliter  melatonin 3 milliGRAM(s) Oral at bedtime PRN Insomnia      __________________________________________________  REVIEW OF SYSTEMS:    CONSTITUTIONAL: No fever,   EYES: no acute visual disturbances  NECK: No pain or stiffness  RESPIRATORY: No cough; No shortness of breath  CARDIOVASCULAR: No chest pain, no palpitations  GASTROINTESTINAL: No pain. No nausea or vomiting; No diarrhea   NEUROLOGICAL: No headache or numbness, no tremors  MUSCULOSKELETAL: No joint pain, no muscle pain  GENITOURINARY: no dysuria, no frequency, no hesitancy  PSYCHIATRY: no depression , no anxiety  ALL OTHER  ROS negative        Vital Signs Last 24 Hrs  T(C): 37.1 (18 Dec 2024 05:25), Max: 37.3 (17 Dec 2024 20:39)  T(F): 98.7 (18 Dec 2024 05:25), Max: 99.2 (17 Dec 2024 20:39)  HR: 63 (18 Dec 2024 05:25) (63 - 67)  BP: 113/59 (18 Dec 2024 05:25) (113/59 - 153/75)  BP(mean): 77 (18 Dec 2024 05:25) (65 - 77)  RR: 18 (18 Dec 2024 05:25) (18 - 18)  SpO2: 94% (18 Dec 2024 05:25) (94% - 95%)    Parameters below as of 18 Dec 2024 05:25  Patient On (Oxygen Delivery Method): room air        ________________________________________________  PHYSICAL EXAM:  GENERAL: NAD  HEENT: Normocephalic;  conjunctivae and sclerae clear; moist mucous membranes;   NECK : supple  CHEST/LUNG: Clear to auscultation bilaterally with good air entry   HEART: S1 S2  regular; no murmurs, gallops or rubs  ABDOMEN: Soft, Nontender, Nondistended; Bowel sounds present  EXTREMITIES: no cyanosis; no edema; no calf tenderness  SKIN: warm and dry; no rash  NERVOUS SYSTEM:  Awake and alert; Oriented  to place, person and time ; no new deficits    _________________________________________________  LABS:                        13.1   11.17 )-----------( 252      ( 18 Dec 2024 07:48 )             40.9     12-18    132[L]  |  101  |  21  ----------------------------<  127[H]  4.0   |  22  |  0.77    Ca    8.4      18 Dec 2024 07:48  Phos  3.7     12-18  Mg     1.8     12-18    TPro  6.9  /  Alb  3.0[L]  /  TBili  0.6  /  DBili  x   /  AST  11  /  ALT  7[L]  /  AlkPhos  78  12-18      Urinalysis Basic - ( 18 Dec 2024 07:48 )    Color: x / Appearance: x / SG: x / pH: x  Gluc: 127 mg/dL / Ketone: x  / Bili: x / Urobili: x   Blood: x / Protein: x / Nitrite: x   Leuk Esterase: x / RBC: x / WBC x   Sq Epi: x / Non Sq Epi: x / Bacteria: x      CAPILLARY BLOOD GLUCOSE      POCT Blood Glucose.: 195 mg/dL (18 Dec 2024 12:32)  POCT Blood Glucose.: 129 mg/dL (18 Dec 2024 07:29)  POCT Blood Glucose.: 134 mg/dL (18 Dec 2024 01:49)  POCT Blood Glucose.: 158 mg/dL (17 Dec 2024 21:53)  POCT Blood Glucose.: 82 mg/dL (17 Dec 2024 17:32)        RADIOLOGY & ADDITIONAL TESTS:      Plan of care was discussed with patient and /or primary care giver; all questions and concerns were addressed and care was aligned with patient's wishes.       Patient is a 59y old  Male who presents with a chief complaint of double vision for 10 days after retina injections (18 Dec 2024 06:57)      INTERVAL HPI/OVERNIGHT EVENTS:At the time of examination patient states headache is better but still had double vision     MEDICATIONS  (STANDING):  aspirin enteric coated 81 milliGRAM(s) Oral daily  clopidogrel Tablet 75 milliGRAM(s) Oral daily  dextrose 5%. 1000 milliLiter(s) (100 mL/Hr) IV Continuous <Continuous>  dextrose 5%. 1000 milliLiter(s) (50 mL/Hr) IV Continuous <Continuous>  dextrose 50% Injectable 25 Gram(s) IV Push once  dextrose 50% Injectable 12.5 Gram(s) IV Push once  dextrose 50% Injectable 25 Gram(s) IV Push once  enoxaparin Injectable 40 milliGRAM(s) SubCutaneous every 12 hours  gabapentin 300 milliGRAM(s) Oral daily  glucagon  Injectable 1 milliGRAM(s) IntraMuscular once  influenza   Vaccine 0.5 milliLiter(s) IntraMuscular once  insulin glargine Injectable (LANTUS) 34 Unit(s) SubCutaneous every morning  insulin glargine Injectable (LANTUS) 34 Unit(s) SubCutaneous at bedtime  insulin lispro (ADMELOG) corrective regimen sliding scale   SubCutaneous Before meals and at bedtime  insulin lispro Injectable (ADMELOG) 22 Unit(s) SubCutaneous three times a day before meals  isosorbide   mononitrate ER Tablet (IMDUR) 120 milliGRAM(s) Oral daily  metoprolol succinate  milliGRAM(s) Oral daily  pantoprazole    Tablet 40 milliGRAM(s) Oral before breakfast  valsartan 320 milliGRAM(s) Oral daily    MEDICATIONS  (PRN):  acetaminophen     Tablet .. 650 milliGRAM(s) Oral every 6 hours PRN Temp greater or equal to 38C (100.4F), Mild Pain (1 - 3)  dextrose Oral Gel 15 Gram(s) Oral once PRN Blood Glucose LESS THAN 70 milliGRAM(s)/deciliter  melatonin 3 milliGRAM(s) Oral at bedtime PRN Insomnia        Vital Signs Last 24 Hrs  T(C): 37.1 (18 Dec 2024 05:25), Max: 37.3 (17 Dec 2024 20:39)  T(F): 98.7 (18 Dec 2024 05:25), Max: 99.2 (17 Dec 2024 20:39)  HR: 63 (18 Dec 2024 05:25) (63 - 67)  BP: 113/59 (18 Dec 2024 05:25) (113/59 - 153/75)  BP(mean): 77 (18 Dec 2024 05:25) (65 - 77)  RR: 18 (18 Dec 2024 05:25) (18 - 18)  SpO2: 94% (18 Dec 2024 05:25) (94% - 95%)    Parameters below as of 18 Dec 2024 05:25  Patient On (Oxygen Delivery Method): room air        ________________________________________________  PHYSICAL EXAM:  GENERAL: NAD  HEENT: moist mucous membranes;   NECK : supple  CHEST/LUNG: Clear to auscultation bilaterally   HEART: S1 S2  regular  ABDOMEN: Soft, Nontender, Nondistended  EXTREMITIES: no cyanosis; no edema; no calf tenderness  SKIN: warm and dry; no rash  NERVOUS SYSTEM:  Awake and alert; Oriented  to place, person and time ; no new deficits    _________________________________________________  LABS:                        13.1   11.17 )-----------( 252      ( 18 Dec 2024 07:48 )             40.9     12-18    132[L]  |  101  |  21  ----------------------------<  127[H]  4.0   |  22  |  0.77    Ca    8.4      18 Dec 2024 07:48  Phos  3.7     12-18  Mg     1.8     12-18    TPro  6.9  /  Alb  3.0[L]  /  TBili  0.6  /  DBili  x   /  AST  11  /  ALT  7[L]  /  AlkPhos  78  12-18      Urinalysis Basic - ( 18 Dec 2024 07:48 )    Color: x / Appearance: x / SG: x / pH: x  Gluc: 127 mg/dL / Ketone: x  / Bili: x / Urobili: x   Blood: x / Protein: x / Nitrite: x   Leuk Esterase: x / RBC: x / WBC x   Sq Epi: x / Non Sq Epi: x / Bacteria: x      CAPILLARY BLOOD GLUCOSE      POCT Blood Glucose.: 195 mg/dL (18 Dec 2024 12:32)  POCT Blood Glucose.: 129 mg/dL (18 Dec 2024 07:29)  POCT Blood Glucose.: 134 mg/dL (18 Dec 2024 01:49)  POCT Blood Glucose.: 158 mg/dL (17 Dec 2024 21:53)  POCT Blood Glucose.: 82 mg/dL (17 Dec 2024 17:32)        RADIOLOGY & ADDITIONAL TESTS:      Plan of care was discussed with patient and /or primary care giver; all questions and concerns were addressed and care was aligned with patient's wishes.

## 2024-12-18 NOTE — PROGRESS NOTE ADULT - ATTENDING COMMENTS
59-year-old man with uncontrolled diabetes and hypertension presenting with diplopia found to have a right abducens.  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 59-year-old man with uncontrolled diabetes and hypertension presenting with diplopia found to have a right abducens.  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 Blood pressure improved since admission, and endocrine consult medicine comanagement for optimization of glycemic control.

## 2024-12-18 NOTE — DISCHARGE NOTE PROVIDER - NS AS DC PROVIDER CONTACT Y/N MULTI
No Jo Patient Age: 89 year old  MESSAGE:     Elliott with [facility] is requesting to verify if Dr. Anaya is pt's PCP. Elliott is requesting to verify if Dr. Anaya will follow pt in home care and sign plan of treatment.        WEIGHT AND HEIGHT:   Wt Readings from Last 1 Encounters:   10/11/19 49 kg (108 lb)     Ht Readings from Last 1 Encounters:   10/11/19 4' 10\" (1.473 m)     BMI Readings from Last 1 Encounters:   10/11/19 22.57 kg/m²       ALLERGIES:  Gabapentin; Hydrocodone-acetaminophen; Lisinopril; and Sulfa antibiotics  Current Outpatient Medications   Medication   • metoCLOPramide (REGLAN) 10 MG tablet   • prochlorperazine (COMPAZINE) 10 MG tablet   • HYDROmorphone (DILAUDID) 2 MG tablet   • atorvastatin (LIPITOR) 20 MG tablet   • allopurinol (ZYLOPRIM) 100 MG tablet   • potassium CHLORIDE (KLOR-CON M) 20 MEQ paola ER tablet   • labetalol (NORMODYNE) 100 MG tablet   • amLODIPine (NORVASC) 10 MG tablet   • Vitamin D, Ergocalciferol, 87301 units capsule   • omeprazole (PRILOSEC) 40 MG capsule   • acetaminophen-codeine (TYLENOL NO.3) 300-30 MG per tablet   • ondansetron (ZOFRAN) 4 MG tablet   • cyclobenzaprine (FLEXERIL) 5 MG tablet   • budesonide (ENTOCORT EC) 3 MG 24 hr capsule   • Vitamin D, Ergocalciferol, 92419 units capsule   • escitalopram (LEXAPRO) 20 MG tablet   • fluticasone (FLONASE) 50 MCG/ACT nasal spray     No current facility-administered medications for this visit.      PHARMACY to use: please request preferred pharmacy from pt if needed             Pharmacy preference(s) on file:   [id number] - [place], [place] - [address]  [address] [address]  Phone: [phone] Fax: [phone]      CALL BACK INFO: DO NOT LEAVE A MESSAGE - contact patient directly  ROUTING: Patient's physician/staff        PCP: Nicky Anaya DO         INS: Payor: MEDICARE / Plan: PARTA AND B / Product Type: MEDICARE   PATIENT ADDRESS:  [address] [place] [address] [place] [address]     Yes

## 2024-12-18 NOTE — PROGRESS NOTE ADULT - ASSESSMENT
59 year old male with history of HTN, Diabetes, Bell's palsy, HLD, CAD s/p CABG, diabetic retinopathy undergoing retinal injections b/l outpatient, presenting with dizziness, Binocular Diplopia, and R frontal/temporal HA and pressure behind his eye (throbbing, rated 9/10- despite tylenol) since his retina  injection 10 days ago. Admitted for partial/full diabetic nerve palsy, encephalitis, meningitis, stroke workup.      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  f/u MRI w/o contrast to r/o stroke  Consider ophthalmology consult  Management as per neuro team      #Hyponatremia  Obtain Urine Na, Urine osm, and serum osm     #DM  #Morbid obesity   On Lantus - 34 BID and Novolog - 22 TID at home   Continue 34 U lantus BID and 22U admelog before meals + ISS  consistent carb diet  BG are well controlled on this regimen      #Neuropathy  c/w home gabapentin 300mg qD    #CAD s/p CABG  #HTN  c/w home ASA & Plavix, metoprolol, isosorbide and valsartan wit hold parameters     #HLD  takes Repatha q 2 weeks (already taken recently)    Patient would benefit from outpatient sleep study to r/o JASON    Lovenox for DVT ppx

## 2024-12-18 NOTE — DISCHARGE NOTE NURSING/CASE MANAGEMENT/SOCIAL WORK - PATIENT PORTAL LINK FT
You can access the FollowMyHealth Patient Portal offered by [facility] by registering at the following website: [email or web address]. By joining "[employer]"’s FollowMyHealth portal, you will also be able to view your health information using other applications (apps) compatible with our system.

## 2024-12-18 NOTE — DISCHARGE NOTE NURSING/CASE MANAGEMENT/SOCIAL WORK - FINANCIAL ASSISTANCE
[facility] provides services at a reduced cost to those who are determined to be eligible through [facility]’s financial assistance program. Information regarding [facility]’s financial assistance program can be found by going to [email or web address] or by calling [phone].

## 2024-12-18 NOTE — DISCHARGE NOTE PROVIDER - HOSPITAL COURSE
#Discharge: do not delete    Mr. Owusu is a 58 y/o M with a PMHx of HTN, DM2, Bell's palsy, HLD, CAD s/p CABG, and diabetic retinopathy undergoing retinal injections b/l outpatient, who presented with dizziness, diplopia, and R frontal/temporal HA and pressure behind his eye (throbbing, rated 9/10- despite tylenol) since his retina injection 10 days prior to admission. The patient reports diplopia when both eyes are open and focusing on image, but notes that it resolves when he covers or closes either eye. The patient endorses getting retina injections every other week at [facility] for the past 4-5 months, and this has never happened before. He stated he hasn't seen his ophthalmologist in over 1-2 years since they check his eyes as well. No known history of glaucoma. A stroke code was activated on admission; however, CT imaging was negative for any acute pathology, occlusion, hemorrhage, or perfusion defect. On examination, the patient was found to have R sixth nerve palsy likely 2/2 to poorly controlled diabetes. Despite the time course, less likely injury from the injection, as the site of injury is the opposite side from the injection, and the mechanism required to injure CN6 would be unlikely to result from this type of injection.    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    Workup completed:  [x] CTH: No hydrocephalus, acute intracranial hemorrhage, mass effect, or brain edema.  [x] CTA H/N: Moderate short segment stenosis of the proximal precavernous right ICA. Mild to moderate short segment stenosis of the distal cavernous right ICA. Otherwise normal flow-related enhancement of the right skull base/intracranial ICA. Mild to moderate multifocal short segment stenoses of the left intradural vertebral artery. No large vessel occlusion or vascular aneurysm. No AVM. Venous system is well opacified, no evidence for venous sinus or cortical vein thrombosis. 65-70% short segment stenosis of the origin of the right internal carotid artery due to mildly calcified plaque. Normal flow-related enhancement of the more distal vessel.  [x] MRI brain n/c:    No large vessel occlusion. No acute arterial dissection. No vascular   aneurysm.    Patient was discharged to: Home    New medications: None  Changes to old medications: None  Medications that were stopped: None    Items to follow up as outpatient: F/u with outpatient general neurology in 2-4 weeks, f/u with PCP within 1 month, f/u with endocrinology out patient within 2-4 weeks.    Physical exam at the time of discharge:  Mental status: Awake, alert and oriented x3.  Recent and remote memory intact.  Naming, repetition and comprehension intact.  Attention/concentration intact.  No dysarthria, no aphasia.     Cranial nerves: Pupils equally round and reactive to light, visual fields decreased in the temporal fields superiorly and inferiorly, no nystagmus, decreased abduction of the R eye with increased diplopia on rightward gaze. OD 20/70; OS 20/50. Face symmetric, hearing intact to finger rub, palate elevation symmetric, tongue was midline.  Motor:  MRC grading 5/5 b/l UE/LE.   strength 5/5.  Normal tone and bulk.  No abnormal movements.    Sensation: Intact to light touch, proprioception, temperature, vibration, and pinprick.   Coordination: No dysmetria on finger-to-nose (if performed with one eye closed; if both eyes open, pt is unable to hit the target) and heel-to-shin.  No dysdiadochokinesia.  Reflexes: 1+ in bilateral UE/LE, downgoing toes bilaterally. (-) Paige.  Gait: Narrow-based and hesitant, but not ataxic. Romberg negative.     #Discharge: do not delete    Mr. Owusu is a 60 y/o M with a PMHx of HTN, DM2, Bell's palsy, HLD, CAD s/p CABG, and diabetic retinopathy undergoing retinal injections b/l outpatient, who presented with dizziness, diplopia, and R frontal/temporal HA and pressure behind his eye (throbbing, rated 9/10- despite tylenol) since his retina injection 10 days prior to admission. The patient reports diplopia when both eyes are open and focusing on image, but notes that it resolves when he covers or closes either eye. The patient endorses getting retina injections every other week at [facility] for the past 4-5 months, and this has never happened before. He stated he hasn't seen his ophthalmologist in over 1-2 years since they check his eyes as well. No known history of glaucoma. A stroke code was activated on admission; however, CT imaging was negative for any acute pathology, occlusion, hemorrhage, or perfusion defect. On examination, the patient was found to have R sixth nerve palsy likely 2/2 to poorly controlled diabetes. Despite the time course, less likely injury from the injection, as the site of injury is the opposite side from the injection, and the mechanism required to injure CN6 would be unlikely to result from this type of injection.    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    Workup completed:  [x] CTH: No hydrocephalus, acute intracranial hemorrhage, mass effect, or brain edema.  [x] CTA H/N: Moderate short segment stenosis of the proximal precavernous right ICA. Mild to moderate short segment stenosis of the distal cavernous right ICA. Otherwise normal flow-related enhancement of the right skull base/intracranial ICA. Mild to moderate multifocal short segment stenoses of the left intradural vertebral artery. No large vessel occlusion or vascular aneurysm. No AVM. Venous system is well opacified, no evidence for venous sinus or cortical vein thrombosis. 65-70% short segment stenosis of the origin of the right internal carotid artery due to mildly calcified plaque. Normal flow-related enhancement of the more distal vessel.  [x] MRI brain n/c:    No large vessel occlusion. No acute arterial dissection. No vascular   aneurysm.    Patient was discharged to: Home    New medications: None  Changes to old medications: None  Medications that were stopped: None    Items to follow up as outpatient: F/u with outpatient general neurology in 2-4 weeks, f/u with PCP within 1 month, f/u with endocrinology out patient within 2-4 weeks.    Physical exam at the time of discharge:  Mental status: Awake, alert and oriented x3.  Recent and remote memory intact.  Naming, repetition and comprehension intact.  Attention/concentration intact.  No dysarthria, no aphasia.     Cranial nerves: Pupils equally round and reactive to light, visual fields decreased in the temporal fields superiorly and inferiorly, no nystagmus, decreased abduction of the R eye with increased diplopia on rightward gaze. OD 20/70; OS 20/50. Face symmetric, hearing intact to finger rub, palate elevation symmetric, tongue was midline.  Motor:  MRC grading 5/5 b/l UE/LE.   strength 5/5.  Normal tone and bulk.  No abnormal movements.    Sensation: Intact to light touch, proprioception, temperature, vibration, and pinprick.   Coordination: No dysmetria on finger-to-nose (if performed with one eye closed; if both eyes open, pt is unable to hit the target) and heel-to-shin.  No dysdiadochokinesia.  Reflexes: 1+ in bilateral UE/LE, downgoing toes bilaterally. (-) Paige.  Gait: Narrow-based and hesitant, but not ataxic. Romberg negative.    The pt is clinically sober, A&Ox3, free from distracting injury.  Throughout our interactions in the ED and medicine floor today, the pt has demonstrated concrete thinking/reasoning, has maintained an orderly/reasonable conversation, appears to have intact insight/judgment/reason and therefore in our opinion has capacity to make decisions. The pt verbalized an understanding of our worries. We’ve told the patient that the hospital evaluation is incomplete & many troublesome conditions haven’t  been r/o. We have discussed the need for further inpatient w/u so we can get more information. We have discussed the range of possible dx, potential testing & tx options. We’ve made  numerous efforts to prevent the pt from leaving AMA.  Our discussions included the potential outcomes of leaving AMA, including worsening of their condition, becoming permanently disabled/in pain/critically ill, or death.  Despite these efforts, we were unable to convince the pt to stay. The pt is refusing any  further care and is leaving against medical advice. We have attempted to offer tx/rx/guidance for any dangerous conditions which are most likely and/or dangerous.  We have answered all questions and have implored the pt to return ASAP to complete the w/u.     #Discharge: do not delete    Mr. Owusu is a 60 y/o M with a PMHx of HTN, DM2, Bell's palsy, HLD, CAD s/p CABG, and diabetic retinopathy undergoing retinal injections b/l outpatient, who presented with dizziness, diplopia, and R frontal/temporal HA and pressure behind his eye (throbbing, rated 9/10- despite tylenol) since his retina injection 10 days prior to admission. The patient reports diplopia when both eyes are open and focusing on image, but notes that it resolves when he covers or closes either eye. The patient endorses getting retina injections every other week at [facility] for the past 4-5 months, and this has never happened before. He stated he hasn't seen his ophthalmologist in over 1-2 years since they check his eyes as well. No known history of glaucoma. A stroke code was activated on admission; however, CT imaging was negative for any acute pathology, occlusion, hemorrhage, or perfusion defect. On examination, the patient was found to have R sixth nerve palsy likely 2/2 to poorly controlled diabetes. Despite the time course, less likely injury from the injection, as the site of injury is the opposite side from the injection, and the mechanism required to injure CN6 would be unlikely to result from this type of injection.    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    Workup completed:  [x] CTH: No hydrocephalus, acute intracranial hemorrhage, mass effect, or brain edema.  [x] CTA H/N: Moderate short segment stenosis of the proximal precavernous right ICA. Mild to moderate short segment stenosis of the distal cavernous right ICA. Otherwise normal flow-related enhancement of the right skull base/intracranial ICA. Mild to moderate multifocal short segment stenoses of the left intradural vertebral artery. No large vessel occlusion or vascular aneurysm. No AVM. Venous system is well opacified, no evidence for venous sinus or cortical vein thrombosis. 65-70% short segment stenosis of the origin of the right internal carotid artery due to mildly calcified plaque. Normal flow-related enhancement of the more distal vessel.  [x] MRI brain n/c: Not performed; patient left AMA before this test could be completed.    No large vessel occlusion. No acute arterial dissection. No vascular   aneurysm.    Patient was discharged to: Home    New medications: None  Changes to old medications: None  Medications that were stopped: None    Items to follow up as outpatient: F/u with outpatient general neurology in 2-4 weeks, f/u with PCP within 1 month, f/u with endocrinology out patient within 2-4 weeks.    Physical exam at the time of discharge:  Mental status: Awake, alert and oriented x3.  Recent and remote memory intact.  Naming, repetition and comprehension intact.  Attention/concentration intact.  No dysarthria, no aphasia.     Cranial nerves: Pupils equally round and reactive to light, visual fields decreased in the temporal fields superiorly and inferiorly, no nystagmus, decreased abduction of the R eye with increased diplopia on rightward gaze. OD 20/70; OS 20/50. Face symmetric, hearing intact to finger rub, palate elevation symmetric, tongue was midline.  Motor:  MRC grading 5/5 b/l UE/LE.   strength 5/5.  Normal tone and bulk.  No abnormal movements.    Sensation: Intact to light touch, proprioception, temperature, vibration, and pinprick.   Coordination: No dysmetria on finger-to-nose (if performed with one eye closed; if both eyes open, pt is unable to hit the target) and heel-to-shin.  No dysdiadochokinesia.  Reflexes: 1+ in bilateral UE/LE, downgoing toes bilaterally. (-) Paige.  Gait: Narrow-based and hesitant, but not ataxic. Romberg negative.    The pt is clinically sober, A&Ox3, free from distracting injury.  Throughout our interactions in the ED and medicine floor today, the pt has demonstrated concrete thinking/reasoning, has maintained an orderly/reasonable conversation, appears to have intact insight/judgment/reason and therefore in our opinion has capacity to make decisions. The pt verbalized an understanding of our worries. We’ve told the patient that the hospital evaluation is incomplete & many troublesome conditions haven’t  been r/o. We have discussed the need for further inpatient w/u so we can get more information. We have discussed the range of possible dx, potential testing & tx options. We’ve made  numerous efforts to prevent the pt from leaving AMA.  Our discussions included the potential outcomes of leaving AMA, including worsening of their condition, becoming permanently disabled/in pain/critically ill, or death.  Despite these efforts, we were unable to convince the pt to stay. The pt is refusing any  further care and is leaving against medical advice. We have attempted to offer tx/rx/guidance for any dangerous conditions which are most likely and/or dangerous.  We have answered all questions and have implored the pt to return ASAP to complete the w/u.

## 2024-12-18 NOTE — DISCHARGE NOTE PROVIDER - NSDCMRMEDTOKEN_GEN_ALL_CORE_FT
aspirin 81 mg oral delayed release tablet: 1 tab(s) orally once a day  clopidogrel 75 mg oral tablet: 1 tab(s) orally once a day  gabapentin 300 mg oral capsule: 1 cap(s) orally once a day  isosorbide mononitrate 120 mg oral tablet, extended release: 1 tab(s) orally once a day  metoprolol succinate 100 mg oral tablet, extended release: 1 tab(s) orally once a day  NovoLOG 100 units/mL subcutaneous solution: 22 unit(s) subcutaneous 3 times a day  Protonix 40 mg oral delayed release tablet: 1 tab(s) orally once a day  Repatha 140 mg/mL subcutaneous solution: subcutaneous every 2 weeks  off of meds since May 2022- ins not cover  valsartan-hydrochlorothiazide 320 mg-25 mg oral tablet: 1 tab(s) orally once a day

## 2024-12-24 LAB
% ALBUMIN: 48.6 % — SIGNIFICANT CHANGE UP
% ALPHA 1: 4.3 % — SIGNIFICANT CHANGE UP
% ALPHA 2: 14.1 % — SIGNIFICANT CHANGE UP
% BETA: 17.2 % — SIGNIFICANT CHANGE UP
% GAMMA: 15.8 % — SIGNIFICANT CHANGE UP
ALBUMIN SERPL ELPH-MCNC: 2.9 G/DL — LOW (ref 3.6–5.5)
ALBUMIN/GLOB SERPL ELPH: 1 RATIO — SIGNIFICANT CHANGE UP
ALPHA1 GLOB SERPL ELPH-MCNC: 0.3 G/DL — SIGNIFICANT CHANGE UP (ref 0.1–0.4)
ALPHA2 GLOB SERPL ELPH-MCNC: 0.8 G/DL — SIGNIFICANT CHANGE UP (ref 0.5–1)
B-GLOBULIN SERPL ELPH-MCNC: 1 G/DL — SIGNIFICANT CHANGE UP (ref 0.5–1)
GAMMA GLOBULIN: 0.9 G/DL — SIGNIFICANT CHANGE UP (ref 0.6–1.6)
PROT PATTERN SERPL ELPH-IMP: SIGNIFICANT CHANGE UP
PROT SERPL-MCNC: 5.9 G/DL — LOW (ref 6–8.3)

## 2024-12-27 DIAGNOSIS — R51.9 HEADACHE, UNSPECIFIED: ICD-10-CM

## 2024-12-27 DIAGNOSIS — E11.39 TYPE 2 DIABETES MELLITUS WITH OTHER DIABETIC OPHTHALMIC COMPLICATION: ICD-10-CM

## 2024-12-27 DIAGNOSIS — G58.8 OTHER SPECIFIED MONONEUROPATHIES: ICD-10-CM

## 2024-12-27 DIAGNOSIS — Z79.82 LONG TERM (CURRENT) USE OF ASPIRIN: ICD-10-CM

## 2024-12-27 DIAGNOSIS — I25.2 OLD MYOCARDIAL INFARCTION: ICD-10-CM

## 2024-12-27 DIAGNOSIS — Z79.84 LONG TERM (CURRENT) USE OF ORAL HYPOGLYCEMIC DRUGS: ICD-10-CM

## 2024-12-27 DIAGNOSIS — E66.01 MORBID (SEVERE) OBESITY DUE TO EXCESS CALORIES: ICD-10-CM

## 2024-12-27 DIAGNOSIS — E11.311 TYPE 2 DIABETES MELLITUS WITH UNSPECIFIED DIABETIC RETINOPATHY WITH MACULAR EDEMA: ICD-10-CM

## 2024-12-27 DIAGNOSIS — Z79.4 LONG TERM (CURRENT) USE OF INSULIN: ICD-10-CM

## 2024-12-27 DIAGNOSIS — E11.49 TYPE 2 DIABETES MELLITUS WITH OTHER DIABETIC NEUROLOGICAL COMPLICATION: ICD-10-CM

## 2024-12-27 DIAGNOSIS — E11.65 TYPE 2 DIABETES MELLITUS WITH HYPERGLYCEMIA: ICD-10-CM

## 2024-12-27 DIAGNOSIS — Z95.5 PRESENCE OF CORONARY ANGIOPLASTY IMPLANT AND GRAFT: ICD-10-CM

## 2024-12-27 DIAGNOSIS — E11.319 TYPE 2 DIABETES MELLITUS WITH UNSPECIFIED DIABETIC RETINOPATHY WITHOUT MACULAR EDEMA: ICD-10-CM

## 2024-12-27 DIAGNOSIS — Z95.1 PRESENCE OF AORTOCORONARY BYPASS GRAFT: ICD-10-CM

## 2024-12-27 DIAGNOSIS — E11.41 TYPE 2 DIABETES MELLITUS WITH DIABETIC MONONEUROPATHY: ICD-10-CM

## 2024-12-27 DIAGNOSIS — E87.1 HYPO-OSMOLALITY AND HYPONATREMIA: ICD-10-CM

## 2024-12-27 DIAGNOSIS — H49.21 SIXTH [ABDUCENT] NERVE PALSY, RIGHT EYE: ICD-10-CM

## 2024-12-27 DIAGNOSIS — R42 DIZZINESS AND GIDDINESS: ICD-10-CM

## 2024-12-27 DIAGNOSIS — H53.2 DIPLOPIA: ICD-10-CM

## 2024-12-27 DIAGNOSIS — I10 ESSENTIAL (PRIMARY) HYPERTENSION: ICD-10-CM

## 2025-01-27 ENCOUNTER — APPOINTMENT (OUTPATIENT)
Dept: OPHTHALMOLOGY | Facility: CLINIC | Age: 60
End: 2025-01-27

## 2025-02-22 NOTE — ASU DISCHARGE PLAN (ADULT/PEDIATRIC) - HAVE YOU HAD COVID IN THE LAST 60 DAYS?

## 2025-04-10 ENCOUNTER — INPATIENT (INPATIENT)
Facility: HOSPITAL | Age: 60
LOS: 0 days | Discharge: HOME | DRG: 603 | End: 2025-04-11
Attending: STUDENT IN AN ORGANIZED HEALTH CARE EDUCATION/TRAINING PROGRAM | Admitting: INTERNAL MEDICINE
Payer: MEDICARE

## 2025-04-10 VITALS
HEART RATE: 98 BPM | RESPIRATION RATE: 18 BRPM | HEIGHT: 67 IN | TEMPERATURE: 98 F | SYSTOLIC BLOOD PRESSURE: 187 MMHG | DIASTOLIC BLOOD PRESSURE: 95 MMHG | OXYGEN SATURATION: 100 % | WEIGHT: 259.93 LBS

## 2025-04-10 DIAGNOSIS — E11.9 TYPE 2 DIABETES MELLITUS WITHOUT COMPLICATIONS: ICD-10-CM

## 2025-04-10 DIAGNOSIS — Z95.5 PRESENCE OF CORONARY ANGIOPLASTY IMPLANT AND GRAFT: Chronic | ICD-10-CM

## 2025-04-10 DIAGNOSIS — I10 ESSENTIAL (PRIMARY) HYPERTENSION: ICD-10-CM

## 2025-04-10 DIAGNOSIS — E78.5 HYPERLIPIDEMIA, UNSPECIFIED: ICD-10-CM

## 2025-04-10 DIAGNOSIS — Z00.00 ENCOUNTER FOR GENERAL ADULT MEDICAL EXAMINATION WITHOUT ABNORMAL FINDINGS: ICD-10-CM

## 2025-04-10 DIAGNOSIS — Z95.1 PRESENCE OF AORTOCORONARY BYPASS GRAFT: Chronic | ICD-10-CM

## 2025-04-10 DIAGNOSIS — E66.01 MORBID (SEVERE) OBESITY DUE TO EXCESS CALORIES: ICD-10-CM

## 2025-04-10 DIAGNOSIS — I73.9 PERIPHERAL VASCULAR DISEASE, UNSPECIFIED: ICD-10-CM

## 2025-04-10 DIAGNOSIS — S91.009A UNSPECIFIED OPEN WOUND, UNSPECIFIED ANKLE, INITIAL ENCOUNTER: ICD-10-CM

## 2025-04-10 DIAGNOSIS — I25.10 ATHEROSCLEROTIC HEART DISEASE OF NATIVE CORONARY ARTERY WITHOUT ANGINA PECTORIS: ICD-10-CM

## 2025-04-10 DIAGNOSIS — E11.43 TYPE 2 DIABETES MELLITUS WITH DIABETIC AUTONOMIC (POLY)NEUROPATHY: ICD-10-CM

## 2025-04-10 DIAGNOSIS — S42.309A UNSPECIFIED FRACTURE OF SHAFT OF HUMERUS, UNSPECIFIED ARM, INITIAL ENCOUNTER FOR CLOSED FRACTURE: Chronic | ICD-10-CM

## 2025-04-10 LAB
ADD ON TEST-SPECIMEN IN LAB: SIGNIFICANT CHANGE UP
ADD ON TEST-SPECIMEN IN LAB: SIGNIFICANT CHANGE UP
ANION GAP SERPL CALC-SCNC: 12 MMOL/L — SIGNIFICANT CHANGE UP (ref 5–17)
BASOPHILS # BLD AUTO: 0.05 K/UL — SIGNIFICANT CHANGE UP (ref 0–0.2)
BASOPHILS NFR BLD AUTO: 0.5 % — SIGNIFICANT CHANGE UP (ref 0–2)
BUN SERPL-MCNC: 13 MG/DL — SIGNIFICANT CHANGE UP (ref 7–23)
CALCIUM SERPL-MCNC: 9 MG/DL — SIGNIFICANT CHANGE UP (ref 8.4–10.5)
CHLORIDE SERPL-SCNC: 102 MMOL/L — SIGNIFICANT CHANGE UP (ref 96–108)
CO2 SERPL-SCNC: 22 MMOL/L — SIGNIFICANT CHANGE UP (ref 22–31)
CREAT SERPL-MCNC: 0.65 MG/DL — SIGNIFICANT CHANGE UP (ref 0.5–1.3)
EGFR: 110 ML/MIN/1.73M2 — SIGNIFICANT CHANGE UP
EGFR: 110 ML/MIN/1.73M2 — SIGNIFICANT CHANGE UP
EOSINOPHIL # BLD AUTO: 0.17 K/UL — SIGNIFICANT CHANGE UP (ref 0–0.5)
EOSINOPHIL NFR BLD AUTO: 1.7 % — SIGNIFICANT CHANGE UP (ref 0–6)
GLUCOSE SERPL-MCNC: 126 MG/DL — HIGH (ref 70–99)
HCT VFR BLD CALC: 44.5 % — SIGNIFICANT CHANGE UP (ref 39–50)
HGB BLD-MCNC: 14.8 G/DL — SIGNIFICANT CHANGE UP (ref 13–17)
IMM GRANULOCYTES NFR BLD AUTO: 0.5 % — SIGNIFICANT CHANGE UP (ref 0–0.9)
LYMPHOCYTES # BLD AUTO: 1.35 K/UL — SIGNIFICANT CHANGE UP (ref 1–3.3)
LYMPHOCYTES # BLD AUTO: 13.8 % — SIGNIFICANT CHANGE UP (ref 13–44)
MCHC RBC-ENTMCNC: 28.6 PG — SIGNIFICANT CHANGE UP (ref 27–34)
MCHC RBC-ENTMCNC: 33.3 G/DL — SIGNIFICANT CHANGE UP (ref 32–36)
MCV RBC AUTO: 86.1 FL — SIGNIFICANT CHANGE UP (ref 80–100)
MONOCYTES # BLD AUTO: 0.75 K/UL — SIGNIFICANT CHANGE UP (ref 0–0.9)
MONOCYTES NFR BLD AUTO: 7.7 % — SIGNIFICANT CHANGE UP (ref 2–14)
NEUTROPHILS # BLD AUTO: 7.42 K/UL — HIGH (ref 1.8–7.4)
NEUTROPHILS NFR BLD AUTO: 75.8 % — SIGNIFICANT CHANGE UP (ref 43–77)
NRBC BLD AUTO-RTO: 0 /100 WBCS — SIGNIFICANT CHANGE UP (ref 0–0)
PLATELET # BLD AUTO: 264 K/UL — SIGNIFICANT CHANGE UP (ref 150–400)
POTASSIUM SERPL-MCNC: 4.5 MMOL/L — SIGNIFICANT CHANGE UP (ref 3.5–5.3)
POTASSIUM SERPL-SCNC: 4.5 MMOL/L — SIGNIFICANT CHANGE UP (ref 3.5–5.3)
RBC # BLD: 5.17 M/UL — SIGNIFICANT CHANGE UP (ref 4.2–5.8)
RBC # FLD: 16.9 % — HIGH (ref 10.3–14.5)
SODIUM SERPL-SCNC: 136 MMOL/L — SIGNIFICANT CHANGE UP (ref 135–145)
WBC # BLD: 9.79 K/UL — SIGNIFICANT CHANGE UP (ref 3.8–10.5)
WBC # FLD AUTO: 9.79 K/UL — SIGNIFICANT CHANGE UP (ref 3.8–10.5)

## 2025-04-10 PROCEDURE — 99223 1ST HOSP IP/OBS HIGH 75: CPT

## 2025-04-10 PROCEDURE — 73700 CT LOWER EXTREMITY W/O DYE: CPT | Mod: 26,LT

## 2025-04-10 PROCEDURE — 93970 EXTREMITY STUDY: CPT | Mod: 26

## 2025-04-10 PROCEDURE — 93010 ELECTROCARDIOGRAM REPORT: CPT

## 2025-04-10 PROCEDURE — 71045 X-RAY EXAM CHEST 1 VIEW: CPT | Mod: 26

## 2025-04-10 PROCEDURE — 73610 X-RAY EXAM OF ANKLE: CPT | Mod: 26,LT

## 2025-04-10 PROCEDURE — 99222 1ST HOSP IP/OBS MODERATE 55: CPT

## 2025-04-10 PROCEDURE — 99285 EMERGENCY DEPT VISIT HI MDM: CPT

## 2025-04-10 RX ORDER — DEXTROSE 50 % IN WATER 50 %
12.5 SYRINGE (ML) INTRAVENOUS ONCE
Refills: 0 | Status: DISCONTINUED | OUTPATIENT
Start: 2025-04-10 | End: 2025-04-11

## 2025-04-10 RX ORDER — ASPIRIN 325 MG
81 TABLET ORAL DAILY
Refills: 0 | Status: DISCONTINUED | OUTPATIENT
Start: 2025-04-10 | End: 2025-04-10

## 2025-04-10 RX ORDER — SODIUM CHLORIDE 9 G/1000ML
1000 INJECTION, SOLUTION INTRAVENOUS
Refills: 0 | Status: DISCONTINUED | OUTPATIENT
Start: 2025-04-10 | End: 2025-04-11

## 2025-04-10 RX ORDER — DEXTROSE 50 % IN WATER 50 %
25 SYRINGE (ML) INTRAVENOUS ONCE
Refills: 0 | Status: DISCONTINUED | OUTPATIENT
Start: 2025-04-10 | End: 2025-04-11

## 2025-04-10 RX ORDER — ASPIRIN 325 MG
81 TABLET ORAL DAILY
Refills: 0 | Status: DISCONTINUED | OUTPATIENT
Start: 2025-04-11 | End: 2025-04-11

## 2025-04-10 RX ORDER — VANCOMYCIN HCL IN 5 % DEXTROSE 1.5G/250ML
1000 PLASTIC BAG, INJECTION (ML) INTRAVENOUS ONCE
Refills: 0 | Status: DISCONTINUED | OUTPATIENT
Start: 2025-04-10 | End: 2025-04-10

## 2025-04-10 RX ORDER — INSULIN GLARGINE-YFGN 100 [IU]/ML
26 INJECTION, SOLUTION SUBCUTANEOUS AT BEDTIME
Refills: 0 | Status: DISCONTINUED | OUTPATIENT
Start: 2025-04-10 | End: 2025-04-11

## 2025-04-10 RX ORDER — ENOXAPARIN SODIUM 100 MG/ML
40 INJECTION SUBCUTANEOUS EVERY 12 HOURS
Refills: 0 | Status: DISCONTINUED | OUTPATIENT
Start: 2025-04-10 | End: 2025-04-11

## 2025-04-10 RX ORDER — GABAPENTIN 400 MG/1
300 CAPSULE ORAL AT BEDTIME
Refills: 0 | Status: DISCONTINUED | OUTPATIENT
Start: 2025-04-10 | End: 2025-04-10

## 2025-04-10 RX ORDER — METOPROLOL SUCCINATE 50 MG/1
1 TABLET, EXTENDED RELEASE ORAL
Refills: 0 | DISCHARGE

## 2025-04-10 RX ORDER — GLUCAGON 3 MG/1
1 POWDER NASAL ONCE
Refills: 0 | Status: DISCONTINUED | OUTPATIENT
Start: 2025-04-10 | End: 2025-04-11

## 2025-04-10 RX ORDER — DEXTROSE 50 % IN WATER 50 %
15 SYRINGE (ML) INTRAVENOUS ONCE
Refills: 0 | Status: DISCONTINUED | OUTPATIENT
Start: 2025-04-10 | End: 2025-04-11

## 2025-04-10 RX ORDER — METRONIDAZOLE 250 MG
500 TABLET ORAL EVERY 12 HOURS
Refills: 0 | Status: DISCONTINUED | OUTPATIENT
Start: 2025-04-10 | End: 2025-04-11

## 2025-04-10 RX ORDER — INFLUENZA A VIRUS A/IDAHO/07/2018 (H1N1) ANTIGEN (MDCK CELL DERIVED, PROPIOLACTONE INACTIVATED, INFLUENZA A VIRUS A/INDIANA/08/2018 (H3N2) ANTIGEN (MDCK CELL DERIVED, PROPIOLACTONE INACTIVATED), INFLUENZA B VIRUS B/SINGAPORE/INFTT-16-0610/2016 ANTIGEN (MDCK CELL DERIVED, PROPIOLACTONE INACTIVATED), INFLUENZA B VIRUS B/IOWA/06/2017 ANTIGEN (MDCK CELL DERIVED, PROPIOLACTONE INACTIVATED) 15; 15; 15; 15 UG/.5ML; UG/.5ML; UG/.5ML; UG/.5ML
0.5 INJECTION, SUSPENSION INTRAMUSCULAR ONCE
Refills: 0 | Status: DISCONTINUED | OUTPATIENT
Start: 2025-04-10 | End: 2025-04-11

## 2025-04-10 RX ORDER — PIPERACILLIN-TAZO-DEXTROSE,ISO 3.375G/5
3.38 IV SOLUTION, PIGGYBACK PREMIX FROZEN(ML) INTRAVENOUS ONCE
Refills: 0 | Status: COMPLETED | OUTPATIENT
Start: 2025-04-10 | End: 2025-04-10

## 2025-04-10 RX ORDER — CEFTRIAXONE 500 MG/1
1000 INJECTION, POWDER, FOR SOLUTION INTRAMUSCULAR; INTRAVENOUS EVERY 24 HOURS
Refills: 0 | Status: DISCONTINUED | OUTPATIENT
Start: 2025-04-10 | End: 2025-04-11

## 2025-04-10 RX ORDER — CLOPIDOGREL BISULFATE 75 MG/1
75 TABLET, FILM COATED ORAL DAILY
Refills: 0 | Status: DISCONTINUED | OUTPATIENT
Start: 2025-04-10 | End: 2025-04-10

## 2025-04-10 RX ORDER — ISOSORBIDE MONONITRATE 60 MG/1
120 TABLET, EXTENDED RELEASE ORAL DAILY
Refills: 0 | Status: DISCONTINUED | OUTPATIENT
Start: 2025-04-11 | End: 2025-04-11

## 2025-04-10 RX ORDER — INSULIN LISPRO 100 U/ML
20 INJECTION, SOLUTION INTRAVENOUS; SUBCUTANEOUS
Refills: 0 | Status: DISCONTINUED | OUTPATIENT
Start: 2025-04-10 | End: 2025-04-11

## 2025-04-10 RX ORDER — VANCOMYCIN HCL IN 5 % DEXTROSE 1.5G/250ML
1750 PLASTIC BAG, INJECTION (ML) INTRAVENOUS ONCE
Refills: 0 | Status: COMPLETED | OUTPATIENT
Start: 2025-04-10 | End: 2025-04-10

## 2025-04-10 RX ORDER — GABAPENTIN 400 MG/1
300 CAPSULE ORAL AT BEDTIME
Refills: 0 | Status: DISCONTINUED | OUTPATIENT
Start: 2025-04-10 | End: 2025-04-11

## 2025-04-10 RX ORDER — SIMETHICONE 80 MG
80 TABLET,CHEWABLE ORAL EVERY 8 HOURS
Refills: 0 | Status: DISCONTINUED | OUTPATIENT
Start: 2025-04-10 | End: 2025-04-11

## 2025-04-10 RX ORDER — INSULIN LISPRO 100 U/ML
INJECTION, SOLUTION INTRAVENOUS; SUBCUTANEOUS
Refills: 0 | Status: DISCONTINUED | OUTPATIENT
Start: 2025-04-10 | End: 2025-04-11

## 2025-04-10 RX ORDER — CLOPIDOGREL BISULFATE 75 MG/1
75 TABLET, FILM COATED ORAL DAILY
Refills: 0 | Status: DISCONTINUED | OUTPATIENT
Start: 2025-04-11 | End: 2025-04-11

## 2025-04-10 RX ADMIN — INSULIN GLARGINE-YFGN 26 UNIT(S): 100 INJECTION, SOLUTION SUBCUTANEOUS at 22:33

## 2025-04-10 RX ADMIN — INSULIN LISPRO 2: 100 INJECTION, SOLUTION INTRAVENOUS; SUBCUTANEOUS at 18:09

## 2025-04-10 RX ADMIN — Medication 200 GRAM(S): at 14:20

## 2025-04-10 RX ADMIN — Medication 500 MILLIGRAM(S): at 22:34

## 2025-04-10 RX ADMIN — ENOXAPARIN SODIUM 40 MILLIGRAM(S): 100 INJECTION SUBCUTANEOUS at 18:10

## 2025-04-10 RX ADMIN — CEFTRIAXONE 100 MILLIGRAM(S): 500 INJECTION, POWDER, FOR SOLUTION INTRAMUSCULAR; INTRAVENOUS at 22:32

## 2025-04-10 RX ADMIN — INSULIN LISPRO 2: 100 INJECTION, SOLUTION INTRAVENOUS; SUBCUTANEOUS at 22:32

## 2025-04-10 RX ADMIN — Medication 250 MILLIGRAM(S): at 17:39

## 2025-04-10 RX ADMIN — INSULIN LISPRO 20 UNIT(S): 100 INJECTION, SOLUTION INTRAVENOUS; SUBCUTANEOUS at 18:09

## 2025-04-10 RX ADMIN — Medication 320 MILLIGRAM(S): at 17:41

## 2025-04-10 RX ADMIN — GABAPENTIN 300 MILLIGRAM(S): 400 CAPSULE ORAL at 22:33

## 2025-04-10 NOTE — H&P ADULT - PROBLEM SELECTOR PLAN 2
s/p stenting in ________ at [facility]. s/p multiple stents at [facility] with Dr. Al. On ASA/Plavix for most recent stent. Compliant. Has intolerance to statins and is on Repatha - but due to insurance issues has not had supply for approximately 8 weeks.     Plan:   - Continue ASA/Plavix   - Needs to see his Cardiologist vs. Vascular physician for Repatha (not available inhouse)

## 2025-04-10 NOTE — ED PROVIDER NOTE - ATTENDING APP SHARED VISIT CONTRIBUTION OF CARE
59 DM2, HTN HLD PAD s/p stent in remote hx to LLE now with wound to L ankle x 1 week, painful and discolored.  No fever.  Exam nonhealing ulcer with granulating tissue and mild ttp around site.  No streaking or crepitus.  VSS, HTN on exam.  Warm well perfused LLE with palpable pedal pulses/DP.  No ev of duskiness or ischemia.  NO crepitus or ev of more serious infection.  Plan labs, XR, abx and reassess need for admission.

## 2025-04-10 NOTE — H&P ADULT - HISTORY OF PRESENT ILLNESS
IRB # [id number]  [employer] HF System Post Approval Study    I called patient's home phone @ 1000 this am and left a message on voicemail asking that patient or his wife give me a call back; # left.     Reason for call was to discuss whether they wanted to 1.) come in for the 24 month FU visit for above noted study  2) if they wanted to discontinue from study as they plan to return [employer] equipment 3) Have Dr. Sidhu remove from study 4) Use 7/20/18 visit for 24 month visit although it is 9 days early; awaiting response back from study monitor as it would be a protocol deviation.     Outcome: Awaiting call back from patient or his wife.  Awaiting response back from sponsor monitor.    Chapis Stanford RN, CCRC   [phone]   59 year old male with history of HTN, Diabetes, Bell's palsy, HLD, CAD s/p  CABG 2017 , moderate carotid stenosis,  diabetic retinopathy, lateral rectus palsy,  and PAD w/ STENTwho presents for a non healing foot wound on left ankle.           ED course:   Vitals: T: 97.9, HR: 98, BP: 187-95--> 150/97, RR: 18, 100% on RA   Labs: CBC unremarkable, BMP unremarkable  Imaging: Xray ankle ----   Interventions: Vanc 1750 mg, Zosyn 3.375  Consults: Vascular  59 year old male with history of HTN, Diabetes, HLD, CAD s/p CABG 2017, moderate carotid stenosis, diabetic retinopathy, lateral rectus palsy,  and PAD w/ STENTwho presents for a non healing foot wound over left medial malleolus. He first noticed a scab 5-6 months ago with poor wound healing since then. About 4 days ago, noted the scab fell off and the area became tender and purulent. He is able to walk and move his ankle and denies fevers, chills or other systemic symptoms of infection. Of note, he had a stent placed to the left mSFA in October (wound preceded stent) and has had a total of four stents (2 R leg, 2 L leg) but is unsure of other locations. Follows with vascular Dr. Al at [facility].     He also describes increasing orthopnea in the past several weeks with associated leg swelling. He does not recall his last echo and is unable to lie flat when sleeping but does not recall having an HF diagnosis nor has he been hospitalized for HF in the past. He denies dyspnea at rest and can walk several city blocks. He believes that this is due to his distended abdomen and attributes the distention to increased gas when he takes his insulin, so he has not been taking his insulin in the past several days. He has never had a sleep study.     ED course:   Vitals: T: 97.9, HR: 98, BP: 187-95--> 150/97, RR: 18, 100% on RA   Labs: CBC unremarkable, BMP unremarkable  Imaging: Xray ankle, CT scan ordered   Interventions: Vanc 1750 mg, Zosyn 3.375  Consults: Vascular  59 year old male with history of HTN, Diabetes, HLD, CAD s/p CABG 2017, moderate carotid stenosis, diabetic retinopathy, lateral rectus palsy,and PAD w/ stenting who presents for a non healing foot wound over left medial malleolus. He first noticed a scab 5-6 months ago with poor wound healing since then. About 4-5 days ago, noted the scab fell off and the area became tender and purulent. He is able to walk and move his ankle and denies fevers, chills or other systemic symptoms of infection. Of note, he had a stent placed to the left mSFA in October (wound preceded stent) and has had a total of four stents (2 R leg, 2 L leg) but is unsure of other locations. Follows with vascular Dr. Al at [facility].     He also describes increasing orthopnea in the past several weeks with associated leg swelling. He does not recall his last echo and is unable to lie flat when sleeping but does not recall having an HF diagnosis nor has he been hospitalized for HF in the past. He denies dyspnea at rest and can walk several city blocks. He believes that this is due to his distended abdomen and attributes the distention to increased gas when he takes his insulin, so he has not been taking his insulin in the past several days. He has never had a sleep study.     ED course:   Vitals: T: 97.9, HR: 98, BP: 187-95--> 150/97, RR: 18, 100% on RA   Labs: CBC unremarkable, BMP unremarkable  Imaging: Xray ankle, CT scan ordered   Interventions: Vanc 1750 mg, Zosyn 3.375  Consults: Vascular

## 2025-04-10 NOTE — PATIENT PROFILE ADULT - FUNCTIONAL ASSESSMENT - DAILY ACTIVITY 5.
Problem: Falls - Risk of  Goal: *Absence of Falls  Description: Document Radha Fried Fall Risk and appropriate interventions in the flowsheet. Outcome: Progressing Towards Goal  Note: Fall Risk Interventions:  Mobility Interventions: Assess mobility with egress test, Bed/chair exit alarm, Communicate number of staff needed for ambulation/transfer, OT consult for ADLs, Patient to call before getting OOB, PT Consult for mobility concerns, Strengthening exercises (ROM-active/passive), PT Consult for assist device competence, Utilize walker, cane, or other assistive device              Elimination Interventions: Call light in reach, Patient to call for help with toileting needs, Stay With Me (per policy), Toileting schedule/hourly rounds    History of Falls Interventions: Consult care management for discharge planning, Investigate reason for fall, Door open when patient unattended, Room close to nurse's station         Problem: Pressure Injury - Risk of  Goal: *Prevention of pressure injury  Description: Document Ra Scale and appropriate interventions in the flowsheet.   Outcome: Progressing Towards Goal  Note: Pressure Injury Interventions:       Moisture Interventions: Absorbent underpads, Minimize layers, Maintain skin hydration (lotion/cream), Moisture barrier    Activity Interventions: PT/OT evaluation, Pressure redistribution bed/mattress(bed type), Assess need for specialty bed    Mobility Interventions: Pressure redistribution bed/mattress (bed type), HOB 30 degrees or less, PT/OT evaluation    Nutrition Interventions: Document food/fluid/supplement intake, Discuss nutritional consult with provider, Offer support with meals,snacks and hydration    Friction and Shear Interventions: Lift sheet, Minimize layers, Transfer aides:transfer board/Jack lift/ceiling lift 4 = No assist / stand by assistance

## 2025-04-10 NOTE — ED ADULT NURSE NOTE - NSFALLUNIVINTERV_ED_ALL_ED
Bed/Stretcher in lowest position, wheels locked, appropriate side rails in place/Call bell, personal items and telephone in reach/Instruct patient to call for assistance before getting out of bed/chair/stretcher/Non-slip footwear applied when patient is off stretcher/[place] to call system/Physically safe environment - no spills, clutter or unnecessary equipment/Purposeful proactive rounding/Room/bathroom lighting operational, light cord in reach

## 2025-04-10 NOTE — H&P ADULT - PROBLEM SELECTOR PLAN 3
On Lantus - 34 BID and Novolog - 22 TID at home   Continue 34 U lantus BID and 22U admelog before meals + ISS  consistent carb diet  BG are well controlled on this regimen On Lantus - 34 BID, lispro 26 TID. Has been only intermittently compliant with his regimen in the past 2 weeks because he believes that this is causing abdominal distention. No changes in his insulin regimen. Suspect this is due peripheral edema and not bloating from insulin as well as morbid obesity. Also with complaints of gas.     Plan:   - continue 26 lantus BID (increase if tolerated)   - lispro 20 units TID with meals   - fingersticks q6   - follow up A1c (is seeing a new endo this month. Last A1c was in 8.5% three months ago) Has had worsening orthopnea in the last two weeks, does not remember EF on last echo. BNP is elevated but not overtly and lungs are clear on my exam. No JVD appreciated. Could have ischemic cardiomyopathy. Chest Xray ordered. Will order echo. Also likely with obesity hypoventilation i/s/o morbid obesity. Never smoker.     Plan:   - Echo ordered  - Obtain records from Dr. Nathalia Knowles (his cardiologist)   - Outpatient sleep study for CPAP Has had worsening orthopnea in the last two weeks, does not remember EF on last echo. BNP is elevated but not overtly and lungs are clear on my exam. No JVD appreciated. Could have ischemic cardiomyopathy however echo post CABG had normal EF and no known ischemic events since. Chest Xray ordered. Will order echo. Also likely with obesity hypoventilation i/s/o morbid obesity. Never smoker.     Plan:   - Echo ordered  - Obtain records from Dr. Nathalia Knowles (his cardiologist)   - Outpatient sleep study for CPAP

## 2025-04-10 NOTE — H&P ADULT - PROBLEM SELECTOR PLAN 6
takes Repatha q 2 weeks (already taken recently) Hypertensive on admission, has been intermittently compliant with meds.     Plan:   - continue imdur 120 qd   - continue valsartan 320 mg - HCTZ 25 mg   - continue metoprolol succinate 100 mg qd

## 2025-04-10 NOTE — H&P ADULT - NSHPSOCIALHISTORY_GEN_ALL_CORE
Lives with:   Walks:   Alcohol:   Drugs:   Smoking: Lives with: His mother   Walks: On own   Alcohol: Denies  Drugs: Denies  Smoking: Denies

## 2025-04-10 NOTE — CONSULT NOTE ADULT - ASSESSMENT
59 yr old male never smoker with pmh of DM2, HTN, HLD, carotid stenosis, 3V cabg in 2017, PAD s/p B/l arterial stenting by Dr. Tom Hopkins at [facility] on 10/2024 presenting to [facility] for assesment of non-healing L leg ulcer. Per patient he scratched his leg 5 months ago and a scab developed. The scab came off 4 days ago and he noticed that he had a wound that didn't seem to be healing over the last 4 days. This prompted him to come to the ED. Per patient this is the second time he has had an ulcer on his leg. He had one 2-3 years ago that was treated with IV Abx. Per patient he has a hx of rest pain and claudication of b/l legs that he sought treatment for at [facility]. He is now s/p b/l arterial stenting in oct of 2024 with Dr. Tom Hopkins. At the time he was told he had "95% blockage of the arteries" and would need additional procedures to open up the artery. He is scheduled to see Dr. Hopkins again on 4/30. Patient denies fevers/chills/chest pain. His last A1C ~8.0 when he had it checked 3-4 months ago.  Vascular consulted for evaluation of of LLE wound.     Plan:   -Local wound care   -IV abx   -Recc ID consult   -CT non-con of LLE to rule out abscess

## 2025-04-10 NOTE — ED ADULT NURSE NOTE - OBJECTIVE STATEMENT
59yM presents to the ED with reports of wound to L lower leg. PMHx of HTN, DM, neuropathy, "stents in both my lower legs". pt states w4yfmymp ago he had a scratch to his L lower leg that never healed. states x3days ago noticed drainage from wound and worsening pain. pt reports swelling to BL LE, states has had swelling since vascular stents placed to BL legs in Oct 2024. pt also reports discoloration to BL feet since procedure, worsening over the last two weeks. pt reports no pain at this time but states he gets "sharp nerve pain" to the area intermittent. mild swelling noted to BL LE. wound noted to L lower medial leg, mild yellow drainage noted from wound. denies fevers/chills, CP, SOB, worsening numbness/tingling.

## 2025-04-10 NOTE — ED PROVIDER NOTE - OBJECTIVE STATEMENT
60 y/o m hx HTN, HLD, DM, PAD s/p stent in left leg ([facility] 10/2024) presents c/o painful nonhealing wound to left ankle.  Pt stating he has had a wound there for about 6 months, started as a small break in the skin but never healed.  Pt stating he has been doing dressing changes at home, for the past week the area appears more red, painful, more swollen and has had more discharge from wound onto the gauze which is mostly yellow.  Pt denies fever, chills, numbness/tingling to ext, all other ROS negative.

## 2025-04-10 NOTE — H&P ADULT - NSHPLABSRESULTS_GEN_ALL_CORE
.  LABS:                         14.8   9.79  )-----------( 264      ( 10 Apr 2025 12:31 )             44.5     04-10    136  |  102  |  13  ----------------------------<  126[H]  4.5   |  22  |  0.65    Ca    9.0      10 Apr 2025 12:31        Urinalysis Basic - ( 10 Apr 2025 12:31 )    Color: x / Appearance: x / SG: x / pH: x  Gluc: 126 mg/dL / Ketone: x  / Bili: x / Urobili: x   Blood: x / Protein: x / Nitrite: x   Leuk Esterase: x / RBC: x / WBC x   Sq Epi: x / Non Sq Epi: x / Bacteria: x                RADIOLOGY, EKG & ADDITIONAL TESTS: Reviewed.

## 2025-04-10 NOTE — ED PROVIDER NOTE - CLINICAL SUMMARY MEDICAL DECISION MAKING FREE TEXT BOX
60 y/o m hx HTN, HLD, DM, PAD s/p stent in left leg ([facility] 10/2024) presents c/o nonhealing wound to left ankle over the past 6 months which became acutely painful and more swollen this past week.  Pt afebrile in ED, ext NVI, appears cellulitic on exam.  XR with no soft tissue gas, labs with no leukocytosis.  Will admit for IV abx, discussed with vascular who will consult on pt as well.

## 2025-04-10 NOTE — H&P ADULT - PROBLEM SELECTOR PLAN 8
BMI 40.7. Would benefit from nutrition consult. s/p CABG in 2017. On ASA/Plavix/ repatha (See above.)

## 2025-04-10 NOTE — H&P ADULT - PROBLEM SELECTOR PLAN 4
c/w home gabapentin 300mg qD On Lantus - 34 BID, lispro 26 TID. Has been only intermittently compliant with his regimen in the past 2 weeks because he believes that this is causing abdominal distention. No changes in his insulin regimen. Suspect this is due peripheral edema and not bloating from insulin as well as morbid obesity. Also with complaints of gas.     Plan:   - continue 26 lantus BID (increase if tolerated)   - lispro 20 units TID with meals   - fingersticks q6   - follow up A1c (is seeing a new endo this month. Last A1c was in 8.5% three months ago) On Lantus - 34 qhs, lispro 26 TID. Has been only intermittently compliant with his regimen in the past 2 weeks because he believes that this is causing abdominal distention. No changes in his insulin regimen. Suspect this is due peripheral edema and not bloating from insulin as well as morbid obesity. Also with complaints of gas.     Plan:   - continue 26 lantus BID (increase if tolerated)   - lispro 20 units TID with meals   - fingersticks q6   - follow up A1c (is seeing a new endo this month. Last A1c was in 8.5% three months ago)

## 2025-04-10 NOTE — H&P ADULT - NSHPREVIEWOFSYSTEMS_GEN_ALL_CORE
REVIEW OF SYSTEMS:  CONSTITUTIONAL: No weakness, fevers or chills  EYES/ENT: Decreased vision due to diabetic retinopathy   NECK: No pain or stiffness  RESPIRATORY: No cough, wheezing, hemoptysis; No shortness of breath while sitting but orthopnea.   CARDIOVASCULAR: No current chest pain  GASTROINTESTINAL: No abdominal or epigastric pain. No nausea, vomiting, or hematemesis; No diarrhea or constipation. No melena or hematochezia.  GENITOURINARY: No dysuria, frequency or hematuria  NEUROLOGICAL: No numbness or weakness  SKIN: Worsening painful wound on medial left ankle.

## 2025-04-10 NOTE — H&P ADULT - NSHPPHYSICALEXAM_GEN_ALL_CORE
VITALS:   T(C): 36.9 (04-10-25 @ 13:55), Max: 36.9 (04-10-25 @ 13:55)  HR: 77 (04-10-25 @ 13:55) (77 - 98)  BP: 150/97 (04-10-25 @ 13:55) (150/97 - 187/95)  RR: 18 (04-10-25 @ 13:55) (18 - 18)  SpO2: 96% (04-10-25 @ 13:55) (96% - 100%)    GENERAL: NAD, lying in bed comfortably  HEAD:  Atraumatic, normocephalic  EYES: EOMI, PERRLA, conjunctiva and sclera clear  ENT: Moist mucous membranes  NECK: Supple, no JVD  HEART: Regular rate and rhythm, no murmurs, rubs, or gallops  LUNGS: Unlabored respirations.  Clear to auscultation bilaterally, no crackles, wheezing, or rhonchi  ABDOMEN: Soft, nontender, nondistended, +BS  EXTREMITIES: 2+ peripheral pulses bilaterally. No clubbing, cyanosis, or edema  NERVOUS SYSTEM:  A&Ox3, no focal deficits   SKIN: No rashes or lesions VITALS:   T(C): 36.9 (04-10-25 @ 13:55), Max: 36.9 (04-10-25 @ 13:55)  HR: 77 (04-10-25 @ 13:55) (77 - 98)  BP: 150/97 (04-10-25 @ 13:55) (150/97 - 187/95)  RR: 18 (04-10-25 @ 13:55) (18 - 18)  SpO2: 96% (04-10-25 @ 13:55) (96% - 100%)    GENERAL: Morbidly obese man sitting up in chair in  NAD   HEAD:  Atraumatic, normocephalic  EYES: EOMI, PERRLA, conjunctiva and sclera clear  ENT: Moist mucous membranes  NECK: Supple, no JVD  HEART: Regular rate and rhythm, no murmurs, rubs, or gallops  LUNGS: Unlabored respirations.  Clear to auscultation bilaterally, no crackles, wheezing, or rhonchi  ABDOMEN: Soft, nontender, distended, +BS  EXTREMITIES: 1+ peripheral pulses bilaterally. BL extremities warm, range of motion intact. Open ulceration 4X6 cm over medial malleolus. Extending chronic skin discoloration from PAD to left lower extremity. Surgical scar from CABG on RLE. 1-2+ edema bl (says slightly increased from baseline)   NERVOUS SYSTEM:  A&Ox3, no focal deficits   SKIN: See extremities.

## 2025-04-10 NOTE — H&P ADULT - ATTENDING COMMENTS
59M with PMH CAD s/p CABG, HTN , HLD, DM2, morbid obesity, PAD s/p stenting, presenting with nonhealing L ankle wound and admitted for concern of cellulitis and for IV antibiotics.     Plan  - no culture data to review, no known (per review of chart) history of pseudomonas  - no purulence, can deescalate antibiotics to ceftriaxone and flagyl.  Appreciate input from vascular surgery.   - reports worsening lower extremity edema, getting lower extremity dopplers  - worsening orthopnea for a number of weeks, does not know of any recent TTE.  Last TTE in our system was from 2017, when he underwent CABG  - ESR and CRP are mildly elevated  - no air, or other concern for necrotic infection on CT.  There is no tendon, muscle or bone involvement.  Low suspicion for osteomyelitis, given superficial nature of wound, and it has been present for months.  Normally would see bony destruction at this point.   - agree with rest of documentation above  - would be helpful to obtain collateral information from outpatient providers  - recommend outpatient sleep study, as he likely has JASON    75 minutes spent on this encounter, including face to face with patient, review of chart, care coordination and documentation.

## 2025-04-10 NOTE — ED PROVIDER NOTE - MUSCULOSKELETAL, MLM
left ankle +ulcer to medial ankle with +surrounding erythema, +TTP, +swelling distally on foot, no proximal streaking, DP1+ to left foot, warm to touch, +FROM ankle

## 2025-04-10 NOTE — CONSULT NOTE ADULT - SUBJECTIVE AND OBJECTIVE BOX
Vascular Attending: Dr. Whittaker    HPI:  59 yr old male never smoker with pmh of DM2, HTN, HLD, carotid stenosis, 3V cabg in 2017, PAD s/p B/l arterial stenting by Dr. Tom Hopkins at [facility] on 10/2024 presenting to [facility] for assesment of non-healing L leg ulcer. Per patient he scratched his leg 5 months ago and a scab developed. The scab came off 4 days ago and he noticed that he had a wound that didn't seem to be healing over the last 4 days. This prompted him to come to the ED. Per patient this is the second time he has had an ulcer on his leg. He had one 2-3 years ago that was treated with IV Abx. Per patient he has a hx of rest pain and claudication of b/l legs that he sought treatment for at [facility]. He is now s/p b/l arterial stenting in oct of 2024 with Dr. Tom Hopkins. At the time he was told he had "95% blockage of the arteries" and would need additional procedures to open up the artery. He is scheduled to see Dr. Hopkins again on 4/30. Patient denies fevers/chills/chest pain. His last A1C ~8.0 when he had it checked 3-4 months ago.  Vascular consulted for evaluation of of LLE wound.             PAST MEDICAL & SURGICAL HISTORY:  HTN (hypertension)      HLD (hyperlipidemia)      DM (diabetes mellitus)  Type II, Hg A1C 12.2 ( 8/18/17) , does not check sugar at home regularly      CAD (coronary artery disease)  severe      Obesity  morbid, BMI 40      Cornea conical, bilateral  wears corrective lenses      Stented coronary artery  x 3 ( 2012)      History of MI (myocardial infarction)  2012      Bilateral carotid artery disease      Sleep apnea  never evaluated, STOP BANG 8, high risk      History of Bell's palsy  2015      History of blood clots  s/p Bell's palsy, h/o bloot clot, patient reports being treated 2015      History of eczema      Fracture of arm  1987, left radial fracture/operation/hardware      History of coronary artery stent placement  x3 (2012)      S/P CABG x 4          REVIEW OF SYSTEMS: As per HPI    MEDICATIONS  (STANDING):    MEDICATIONS  (PRN):      Allergies    fish (Unknown)  No Known Drug Allergies    Intolerances        SOCIAL HISTORY:    FAMILY HISTORY:  Family history of diabetes mellitus (Grandparent, Father)    Family history of heart disease (Grandparent, Father)        Vital Signs Last 24 Hrs  T(C): 36.6 (10 Apr 2025 12:03), Max: 36.6 (10 Apr 2025 12:03)  T(F): 97.9 (10 Apr 2025 12:03), Max: 97.9 (10 Apr 2025 12:03)  HR: 98 (10 Apr 2025 12:03) (98 - 98)  BP: 187/95 (10 Apr 2025 12:03) (187/95 - 187/95)  BP(mean): --  RR: 18 (10 Apr 2025 12:03) (18 - 18)  SpO2: 100% (10 Apr 2025 12:03) (100% - 100%)    Parameters below as of 10 Apr 2025 12:03  Patient On (Oxygen Delivery Method): room air        PHYSICAL EXAM:  Constitutional: NAD, AOx3  Respiratory: No respiratory distress  Cardiovascular: RRR  Gastrointestinal: soft, NT, ND  Extremities: RLE: 1+ trace edema. No open ulcers/wounds noted. Healed R medial calf incision(most likely site of GSV harvest) healed well with scarring                   LLE: with irregularly shaped medial wound just superior to the medial malleolus with overlying fibrinous eschar. No purulent drainage noted, wound does not PTB. No foul smell. Surrounding erythema noted with TTP and touch. 5/5 sensory function of foot, 3/5 sensory.   Vascular: Palp fem, pops b/l. LLE: faintly palp pop confirmed triphasic signal. Bi/Tri DP signal.           LABS:                        14.8   9.79  )-----------( 264      ( 10 Apr 2025 12:31 )             44.5     04-10    136  |  102  |  13  ----------------------------<  126[H]  4.5   |  22  |  0.65    Ca    9.0      10 Apr 2025 12:31        Urinalysis Basic - ( 10 Apr 2025 12:31 )    Color: x / Appearance: x / SG: x / pH: x  Gluc: 126 mg/dL / Ketone: x  / Bili: x / Urobili: x   Blood: x / Protein: x / Nitrite: x   Leuk Esterase: x / RBC: x / WBC x   Sq Epi: x / Non Sq Epi: x / Bacteria: x        RADIOLOGY & ADDITIONAL STUDIES

## 2025-04-10 NOTE — H&P ADULT - PROBLEM SELECTOR PLAN 1
Chronic ankle wound on LLE,       Plan:   - appreciate vascular recs   - Chronic ankle wound on LLE, was closed scab until 3-4 days ago. Now w/ purulence at home per patient. Has surrounding erythema. Extremity is warm and DP is palpable. Is s/p Vanc/zosyn in the ED.     CRP 10.4. Not septic, no leukocytosis. Vascular consulted in the ED.       Plan: Chronic ankle wound on LLE, was closed scab until 3-4 days ago. Now w/ purulence at home per patient. Has surrounding erythema. Extremity is warm and DP is palpable. Is s/p Vanc/zosyn in the ED. CRP 10.4. Not septic, no leukocytosis. Vascular consulted in the ED. CTLE: . No drainable fluid collection. Negative for soft tissue gas.    Plan:  - CT LLE   - Vascular recs appreciated   - Wound care   - IV abx w/ Chronic ankle wound on LLE, was closed scab until 3-4 days ago. Now w/ purulence at home per patient. Has surrounding erythema. Extremity is warm and DP is palpable. Is s/p Vanc/zosyn in the ED. CRP 10.4. Not septic, no leukocytosis. Vascular consulted in the ED. CTLE: . No drainable fluid collection. Negative for soft tissue gas.    Plan:  - CT LLE   - Vascular recs appreciated   - Wound care   - IV abx w/ CTX 1 g q24, Flagyl 500 mg q8

## 2025-04-10 NOTE — H&P ADULT - PROBLEM SELECTOR PLAN 9
F: Per oral   E: K>4, Mg>2,   N: DASH and consisent carb   DVT: Lovenox 40 mg BID (for BMI) BMI 40.7. Would benefit from nutrition consult.

## 2025-04-10 NOTE — PATIENT PROFILE ADULT - FALL HARM RISK - HARM RISK INTERVENTIONS

## 2025-04-10 NOTE — H&P ADULT - ASSESSMENT
59 year old male with history of HTN, Diabetes, HLD, CAD s/p CABG 2017, moderate carotid stenosis, diabetic retinopathy, lateral rectus palsy,and PAD w/ stenting who presents for a non healing foot wound over left medial malleolus - admitted for IV abx.

## 2025-04-10 NOTE — ED ADULT TRIAGE NOTE - CHIEF COMPLAINT QUOTE
59yoM PMH diabetes, HTN, HLD, came to ED c/o wound check. Pt states for the past 6 months he has had a " rash" above his L ankle. The past three days he has noticed the tissue " black, and bleeding with puss coming out too". Pt states his home health nurse told him to come to ED. Pt denies fevers, previous antibiotics use, chest pain, SOB. Ambulates with steady gait. Last took asiprin for pain @0730.

## 2025-04-10 NOTE — PATIENT PROFILE ADULT - NSPROPTRIGHTSUPPORTNAME_GEN_A_NUR

## 2025-04-11 ENCOUNTER — TRANSCRIPTION ENCOUNTER (OUTPATIENT)
Age: 60
End: 2025-04-11

## 2025-04-11 ENCOUNTER — RESULT REVIEW (OUTPATIENT)
Age: 60
End: 2025-04-11

## 2025-04-11 VITALS
TEMPERATURE: 98 F | HEART RATE: 75 BPM | OXYGEN SATURATION: 92 % | DIASTOLIC BLOOD PRESSURE: 87 MMHG | RESPIRATION RATE: 18 BRPM | SYSTOLIC BLOOD PRESSURE: 170 MMHG

## 2025-04-11 LAB
A1C WITH ESTIMATED AVERAGE GLUCOSE RESULT: 8.8 % — HIGH (ref 4–5.6)
ANION GAP SERPL CALC-SCNC: 16 MMOL/L — SIGNIFICANT CHANGE UP (ref 5–17)
BASOPHILS # BLD AUTO: 0.05 K/UL — SIGNIFICANT CHANGE UP (ref 0–0.2)
BASOPHILS NFR BLD AUTO: 0.5 % — SIGNIFICANT CHANGE UP (ref 0–2)
BUN SERPL-MCNC: 14 MG/DL — SIGNIFICANT CHANGE UP (ref 7–23)
CALCIUM SERPL-MCNC: 8.8 MG/DL — SIGNIFICANT CHANGE UP (ref 8.4–10.5)
CHLORIDE SERPL-SCNC: 98 MMOL/L — SIGNIFICANT CHANGE UP (ref 96–108)
CO2 SERPL-SCNC: 24 MMOL/L — SIGNIFICANT CHANGE UP (ref 22–31)
CREAT SERPL-MCNC: 0.81 MG/DL — SIGNIFICANT CHANGE UP (ref 0.5–1.3)
EGFR: 103 ML/MIN/1.73M2 — SIGNIFICANT CHANGE UP
EGFR: 103 ML/MIN/1.73M2 — SIGNIFICANT CHANGE UP
EOSINOPHIL # BLD AUTO: 0.17 K/UL — SIGNIFICANT CHANGE UP (ref 0–0.5)
EOSINOPHIL NFR BLD AUTO: 1.8 % — SIGNIFICANT CHANGE UP (ref 0–6)
ESTIMATED AVERAGE GLUCOSE: 206 MG/DL — HIGH (ref 68–114)
GLUCOSE SERPL-MCNC: 114 MG/DL — HIGH (ref 70–99)
HCT VFR BLD CALC: 45.9 % — SIGNIFICANT CHANGE UP (ref 39–50)
HGB BLD-MCNC: 15.2 G/DL — SIGNIFICANT CHANGE UP (ref 13–17)
IMM GRANULOCYTES NFR BLD AUTO: 0.3 % — SIGNIFICANT CHANGE UP (ref 0–0.9)
LYMPHOCYTES # BLD AUTO: 1.53 K/UL — SIGNIFICANT CHANGE UP (ref 1–3.3)
LYMPHOCYTES # BLD AUTO: 15.9 % — SIGNIFICANT CHANGE UP (ref 13–44)
MAGNESIUM SERPL-MCNC: 2 MG/DL — SIGNIFICANT CHANGE UP (ref 1.6–2.6)
MCHC RBC-ENTMCNC: 29.1 PG — SIGNIFICANT CHANGE UP (ref 27–34)
MCHC RBC-ENTMCNC: 33.1 G/DL — SIGNIFICANT CHANGE UP (ref 32–36)
MCV RBC AUTO: 87.8 FL — SIGNIFICANT CHANGE UP (ref 80–100)
MONOCYTES # BLD AUTO: 0.83 K/UL — SIGNIFICANT CHANGE UP (ref 0–0.9)
MONOCYTES NFR BLD AUTO: 8.6 % — SIGNIFICANT CHANGE UP (ref 2–14)
NEUTROPHILS # BLD AUTO: 7.01 K/UL — SIGNIFICANT CHANGE UP (ref 1.8–7.4)
NEUTROPHILS NFR BLD AUTO: 72.9 % — SIGNIFICANT CHANGE UP (ref 43–77)
NRBC BLD AUTO-RTO: 0 /100 WBCS — SIGNIFICANT CHANGE UP (ref 0–0)
PHOSPHATE SERPL-MCNC: 4.1 MG/DL — SIGNIFICANT CHANGE UP (ref 2.5–4.5)
PLATELET # BLD AUTO: 255 K/UL — SIGNIFICANT CHANGE UP (ref 150–400)
POTASSIUM SERPL-MCNC: 3.7 MMOL/L — SIGNIFICANT CHANGE UP (ref 3.5–5.3)
POTASSIUM SERPL-SCNC: 3.7 MMOL/L — SIGNIFICANT CHANGE UP (ref 3.5–5.3)
RBC # BLD: 5.23 M/UL — SIGNIFICANT CHANGE UP (ref 4.2–5.8)
RBC # FLD: 17.2 % — HIGH (ref 10.3–14.5)
SODIUM SERPL-SCNC: 138 MMOL/L — SIGNIFICANT CHANGE UP (ref 135–145)
WBC # BLD: 9.62 K/UL — SIGNIFICANT CHANGE UP (ref 3.8–10.5)
WBC # FLD AUTO: 9.62 K/UL — SIGNIFICANT CHANGE UP (ref 3.8–10.5)

## 2025-04-11 PROCEDURE — 93306 TTE W/DOPPLER COMPLETE: CPT | Mod: 26

## 2025-04-11 PROCEDURE — 83735 ASSAY OF MAGNESIUM: CPT

## 2025-04-11 PROCEDURE — C8929: CPT

## 2025-04-11 PROCEDURE — 93970 EXTREMITY STUDY: CPT

## 2025-04-11 PROCEDURE — 85025 COMPLETE CBC W/AUTO DIFF WBC: CPT

## 2025-04-11 PROCEDURE — 80048 BASIC METABOLIC PNL TOTAL CA: CPT

## 2025-04-11 PROCEDURE — 83880 ASSAY OF NATRIURETIC PEPTIDE: CPT

## 2025-04-11 PROCEDURE — 87040 BLOOD CULTURE FOR BACTERIA: CPT

## 2025-04-11 PROCEDURE — 96374 THER/PROPH/DIAG INJ IV PUSH: CPT

## 2025-04-11 PROCEDURE — 73610 X-RAY EXAM OF ANKLE: CPT

## 2025-04-11 PROCEDURE — 82962 GLUCOSE BLOOD TEST: CPT

## 2025-04-11 PROCEDURE — 96375 TX/PRO/DX INJ NEW DRUG ADDON: CPT

## 2025-04-11 PROCEDURE — 84100 ASSAY OF PHOSPHORUS: CPT

## 2025-04-11 PROCEDURE — 86140 C-REACTIVE PROTEIN: CPT

## 2025-04-11 PROCEDURE — 99285 EMERGENCY DEPT VISIT HI MDM: CPT

## 2025-04-11 PROCEDURE — 36415 COLL VENOUS BLD VENIPUNCTURE: CPT

## 2025-04-11 PROCEDURE — 73700 CT LOWER EXTREMITY W/O DYE: CPT | Mod: MC

## 2025-04-11 PROCEDURE — 99233 SBSQ HOSP IP/OBS HIGH 50: CPT | Mod: 25

## 2025-04-11 PROCEDURE — 71045 X-RAY EXAM CHEST 1 VIEW: CPT

## 2025-04-11 PROCEDURE — 96372 THER/PROPH/DIAG INJ SC/IM: CPT

## 2025-04-11 PROCEDURE — 85652 RBC SED RATE AUTOMATED: CPT

## 2025-04-11 PROCEDURE — 99239 HOSP IP/OBS DSCHRG MGMT >30: CPT | Mod: GC

## 2025-04-11 PROCEDURE — 93005 ELECTROCARDIOGRAM TRACING: CPT

## 2025-04-11 PROCEDURE — 83036 HEMOGLOBIN GLYCOSYLATED A1C: CPT

## 2025-04-11 RX ORDER — INSULIN GLARGINE-YFGN 100 [IU]/ML
34 INJECTION, SOLUTION SUBCUTANEOUS
Refills: 0 | DISCHARGE

## 2025-04-11 RX ORDER — AMLODIPINE BESYLATE 10 MG/1
1 TABLET ORAL
Refills: 0 | DISCHARGE

## 2025-04-11 RX ORDER — SEMAGLUTIDE 1 MG/.5ML
1 INJECTION, SOLUTION SUBCUTANEOUS
Refills: 0 | DISCHARGE

## 2025-04-11 RX ORDER — EMPAGLIFLOZIN 25 MG/1
1 TABLET, FILM COATED ORAL
Refills: 0 | DISCHARGE

## 2025-04-11 RX ORDER — DOXYCYCLINE HYCLATE 100 MG
1 TABLET ORAL
Qty: 14 | Refills: 0
Start: 2025-04-11 | End: 2025-04-17

## 2025-04-11 RX ADMIN — INSULIN LISPRO 20 UNIT(S): 100 INJECTION, SOLUTION INTRAVENOUS; SUBCUTANEOUS at 13:51

## 2025-04-11 RX ADMIN — INSULIN LISPRO 20 UNIT(S): 100 INJECTION, SOLUTION INTRAVENOUS; SUBCUTANEOUS at 09:21

## 2025-04-11 RX ADMIN — CLOPIDOGREL BISULFATE 75 MILLIGRAM(S): 75 TABLET, FILM COATED ORAL at 11:47

## 2025-04-11 RX ADMIN — Medication 500 MILLIGRAM(S): at 10:03

## 2025-04-11 RX ADMIN — Medication 40 MILLIGRAM(S): at 06:54

## 2025-04-11 RX ADMIN — ENOXAPARIN SODIUM 40 MILLIGRAM(S): 100 INJECTION SUBCUTANEOUS at 06:54

## 2025-04-11 RX ADMIN — Medication 320 MILLIGRAM(S): at 06:54

## 2025-04-11 RX ADMIN — ISOSORBIDE MONONITRATE 120 MILLIGRAM(S): 60 TABLET, EXTENDED RELEASE ORAL at 11:49

## 2025-04-11 RX ADMIN — Medication 81 MILLIGRAM(S): at 11:46

## 2025-04-11 NOTE — DISCHARGE NOTE PROVIDER - NSDCCPCAREPLAN_GEN_ALL_CORE_FT
PRINCIPAL DISCHARGE DIAGNOSIS  Diagnosis: Cellulitis  Assessment and Plan of Treatment: Take the prescribed antibiotic medicine you are given as directed until it is gone. Take it even if you feel better. It treats the infection and stops it from returning. Not taking all the medicine can make future infections hard to treat. Keep the infected area clean. When possible, raise the infected area above the level of your heart. This helps keep swelling down. Apply clean bandages as advised. Wash your hands often to prevent spreading the infection. In the future, wash your hands before and after you touch cuts, scratches, or bandages. This will help prevent infection.   Call your doctor or returnt o the emergency room or call 911 immediately if you have any of the following: Difficulty or pain when moving the joints above or below the infected area, Discharge or pus draining from the area, rever of 100.4°F (38°C) or higher, or as directed by your healthcare provider, pain that gets worse in or around the infected site, redness that gets worse in or around the infected area, particularly if the area of redness expands to a wider area, shaking chills, swelling of the infected area, vomiting.  Continue your doxycycline 100mg twice a day and cefadroxil 500mg twice a day until completed a course of 7 days     PRINCIPAL DISCHARGE DIAGNOSIS  Diagnosis: Cellulitis  Assessment and Plan of Treatment: Take the prescribed antibiotic medicine you are given as directed until it is gone. Take it even if you feel better. It treats the infection and stops it from returning. Not taking all the medicine can make future infections hard to treat. Keep the infected area clean. When possible, raise the infected area above the level of your heart. This helps keep swelling down. Apply clean bandages as advised. Wash your hands often to prevent spreading the infection. In the future, wash your hands before and after you touch cuts, scratches, or bandages. This will help prevent infection.   Call your doctor or returnt o the emergency room or call 911 immediately if you have any of the following: Difficulty or pain when moving the joints above or below the infected area, Discharge or pus draining from the area, rever of 100.4°F (38°C) or higher, or as directed by your healthcare provider, pain that gets worse in or around the infected site, redness that gets worse in or around the infected area, particularly if the area of redness expands to a wider area, shaking chills, swelling of the infected area, vomiting.  Continue your doxycycline 100mg twice a day and cefadroxil 500mg twice a day until completed a course of 7 days      SECONDARY DISCHARGE DIAGNOSES  Diagnosis: Hypertension  Assessment and Plan of Treatment: Please follow up with cardiology for titration of your blood pressure medications. You are currently taking metoprolol, valsartan, imdur, and hydrochlorthiazide.

## 2025-04-11 NOTE — DISCHARGE NOTE NURSING/CASE MANAGEMENT/SOCIAL WORK - NSDCPEFALRISK_GEN_ALL_CORE
For information on Fall & Injury Prevention, visit: [email or web address] OR  [email or web address] OR  https://www.cdc.gov/steadi/patient.html

## 2025-04-11 NOTE — DISCHARGE NOTE PROVIDER - HOSPITAL COURSE
#Discharge: do not delete    59 year old male with history of HTN, Diabetes, HLD, CAD s/p CABG 2017, moderate carotid stenosis, diabetic retinopathy, lateral rectus palsy,and PAD w/ stenting who presents for a non healing foot wound over left medial malleolus admitted for IV antibiotics and vascular consulted. Per vascular, no indication for debridement or CT angiogram unless wound is worsening. Started on IV CTX and flagyl for antibiotics.     Problem List/Main Diagnoses:   Left medial malleolus wound, likely arterial     Chronic ankle wound on LLE, was closed scab until 3-4 days ago. Now w/ purulence at home per patient. Has surrounding erythema. Extremity is warm and DP is palpable. Is s/p Vanc/zosyn in the ED. CRP 10.4. Not septic, no leukocytosis. Vascular consulted in the ED. CTLE: . No drainable fluid collection. Negative for soft tissue gas.  CT LLE without gas, only edema    - discharge on doxycycline 100mg BID x7 days and cefadroxil 500mg BID x7 days  - f/u outpatient vascular   - f/u outpatient provider    Peripheral artery disease.   s/p multiple stents at [facility] with Dr. Al. On ASA/Plavix for most recent stent. Compliant. Has intolerance to statins and is on Repatha - but due to insurance issues has not had supply for approximately 8 weeks.     Plan:   - Continue ASA/Plavix   - Needs to see his Cardiologist vs. Vascular physician for Repatha (not available inhouse).    Orthopnea.   Has had worsening orthopnea in the last two weeks, does not remember EF on last echo. BNP is elevated but not overtly and lungs are clear on my exam. No JVD appreciated. Could have ischemic cardiomyopathy however echo post CABG had normal EF and no known ischemic events since. Chest Xray ordered. Will order echo. Also likely with obesity hypoventilation i/s/o morbid obesity. Never smoker.     Plan:   - f/u outpatient with Dr. Nathalia Knowles (his cardiologist)   - Outpatient sleep study for CPAP.    Diabetes mellitus.    On Lantus - 34 qhs, lispro 26 TID. Has been only intermittently compliant with his regimen in the past 2 weeks because he believes that this is causing abdominal distention. No changes in his insulin regimen. Suspect this is due peripheral edema and not bloating from insulin as well as morbid obesity. Also with complaints of gas.     Plan:   - c/w home lantus and lispro    Diabetic peripheral autonomic neuropathy.   c/w home gabapentin 300mg qD.    Hypertension.   Hypertensive on admission, has been intermittently compliant with meds.     Plan:   - continue imdur 120 qd   - continue valsartan 320 mg - HCTZ 25 mg   - continue metoprolol succinate 100 mg qd.    Hyperlipidemia.   takes Repatha q 2 weeks - but has not taken it in 2 months due to insurance issues. High risk vascular patient - needs alternative if unable to take this. Follow up with vascular and cards outpatient.    CAD (coronary artery disease).   s/p CABG in 2017. On ASA/Plavix/ repatha (See above.).    Morbid obesity.   BMI 40.7. Would benefit from nutrition consult    New medications/therapies: discharge on doxycycline 100mg BID x7 days and cefadroxil 500mg BID x7 days  New lines/hardware:  Labs to be followed outpatient:   Exam to be followed outpatient:     Discharge plan: discharge to home    Physical Exam Upon Discharge:  GENERAL: Morbidly obese man laying in bed  HEAD:  Atraumatic, normocephalic  EYES: EOMI, PERRLA, conjunctiva and sclera clear  ENT: Moist mucous membranes  NECK: Supple, no JVD  HEART: Regular rate and rhythm, no murmurs, rubs, or gallops  LUNGS: Unlabored respirations.  Clear to auscultation bilaterally, no crackles, wheezing, or rhonchi  ABDOMEN: Soft, nontender, distended, +BS  EXTREMITIES: 1+ peripheral pulses bilaterally. BL extremities warm, range of motion intact. Open ulceration 4X6 cm over medial malleolus. Extending chronic skin discoloration from PAD to left lower extremity. Surgical scar from CABG on RLE. 1-2+ edema bl (says slightly increased from baseline)   NERVOUS SYSTEM:  A&Ox3, no focal deficits

## 2025-04-11 NOTE — DISCHARGE NOTE PROVIDER - NSDCFUADDAPPT_GEN_ALL_CORE_FT
Please see your primary care provider in 1 week. Return to the ER if you experience shortness of breath, chest pain/palpitations, sudden visual loss/headache, fever, worsening leg swelling/pain.    Please meet with Sleep Medicine for JASON and sleep study.

## 2025-04-11 NOTE — PROGRESS NOTE ADULT - ASSESSMENT
ASSESSMENT  - PAD s/p BLE endovascular intervention by other providers at OSH (most recently 10/2024 at [place]), now presenting to [facility] for non-healing LLE wound just above medial malleolus w/ surrounding cellulitis --> hopefully improves with good local wound care and ABX      PLAN & RECOMMENDTIONS  - AM labs  - IV ABX (likely for a few more days), ID consult  - Local wound care with wet to dry dressings  - Obtain LLE venous duplex US  - Vascular surgery will continue to follow. If exam changes or worsens will re-evaluate for angiogram +/- debridement. Will arrange for vascular follow up on 4/28/25      Thank you,    Umesh Whittaker MD   of Vascular Surgery  [facility] at [address] [place] [address]  Office: [phone]; Fax: [phone]  [email or web address]     Time-based billing (NON-critical care).     75 minutes spent on total encounter. The necessity of the time spent during the encounter on this date of service was due to:     More than 50 percent of which was spent on counseling and coordination of care.

## 2025-04-11 NOTE — DISCHARGE NOTE PROVIDER - ATTENDING DISCHARGE PHYSICAL EXAMINATION:
Gen: NAD, resting in bed comfortably   HEENT: EOMI, PERRL, no scleral icterus  CV: normal S1 and S2  Lungs: CTABL, normal respiratory effort on RA  Ab: obese, soft, NT, ND, normal BS  Ext: +1 LE edema, ulcer near left medial malleolus with granulation tissue, no pus, mild surrounding erythema   Neuro: A&O x 3, no focal deficits     59-year-old male with a PMHx of HTN, DMII, HLD, CAD (s/p CABG), JAYRO and PAD (s/p stenting, on DAPT) who presented with left medial malleolus wound. CT consistent with cellulitis, no evidence of osteomyelitis or drainable fluid collection. DVT US negative. No fevers or leukocytosis with minimally elevated inflammatory markers.     Plan:    -vascular surgery consulted, no current plan for angiogram or debridement, appreciate recommendations    -discharge with Doxycycline and Cefadroxil to complete 7-day course   -wound care consult    -outpatient follow up with cardiology and vascular surgery     Rest of plan as outlined above.

## 2025-04-11 NOTE — DISCHARGE NOTE NURSING/CASE MANAGEMENT/SOCIAL WORK - PATIENT PORTAL LINK FT
You can access the FollowMyHealth Patient Portal offered by [facility] by registering at the following website: [email or web address]. By joining [email or web address]’s FollowMyHealth portal, you will also be able to view your health information using other applications (apps) compatible with our system.

## 2025-04-11 NOTE — DISCHARGE NOTE PROVIDER - NSDCMRMEDTOKEN_GEN_ALL_CORE_FT
aspirin 81 mg oral delayed release tablet: 1 tab(s) orally once a day  cefaclor 500 mg oral capsule: 1 cap(s) orally every 12 hours  clopidogrel 75 mg oral tablet: 1 tab(s) orally once a day  doxycycline hyclate 100 mg oral capsule: 1 cap(s) orally every 12 hours please take 1 tablet every 12 hours  gabapentin 300 mg oral capsule: 1 cap(s) orally once a day  isosorbide mononitrate 120 mg oral tablet, extended release: 1 tab(s) orally once a day  Lantus 100 units/mL subcutaneous solution: 34 subcutaneous once a day (at bedtime)  metoprolol succinate 100 mg oral tablet, extended release: 1 tab(s) orally once a day  NovoLOG 100 units/mL subcutaneous solution: 26 unit(s) subcutaneous 3 times a day  Protonix 40 mg oral delayed release tablet: 1 tab(s) orally once a day  Repatha 140 mg/mL subcutaneous solution: subcutaneous every 2 weeks  off of meds since May 2022- ins not cover  valsartan-hydrochlorothiazide 320 mg-25 mg oral tablet: 1 tab(s) orally once a day   aspirin 81 mg oral delayed release tablet: 1 tab(s) orally once a day  cefaclor 500 mg oral capsule: 1 cap(s) orally every 12 hours  clopidogrel 75 mg oral tablet: 1 tab(s) orally once a day  doxycycline hyclate 100 mg oral capsule: 1 cap(s) orally every 12 hours please take 1 tablet every 12 hours  gabapentin 300 mg oral capsule: 1 cap(s) orally once a day (at bedtime)  isosorbide mononitrate 120 mg oral tablet, extended release: 1 tab(s) orally once a day  Lantus 100 units/mL subcutaneous solution: 34 subcutaneous once a day (at bedtime)  metoprolol succinate 100 mg oral tablet, extended release: 1 tab(s) orally once a day  NovoLOG 100 units/mL subcutaneous solution: 26 unit(s) subcutaneous 3 times a day (with meals)  Protonix 40 mg oral delayed release tablet: 1 tab(s) orally once a day  Repatha 140 mg/mL subcutaneous solution: subcutaneous every 2 weeks  off of meds since May 2022- ins not cover  valsartan-hydrochlorothiazide 320 mg-25 mg oral tablet: 1 tab(s) orally once a day

## 2025-04-11 NOTE — PROGRESS NOTE ADULT - SUBJECTIVE AND OBJECTIVE BOX
Mr. Iain Owusu is 59M w/ HTN, HLD, carotid stenosis, CAD s/p CABG (2017), and PAD s/p BLE endovascular intervention by other providers at OSH (most recently 10/2024 at [place]), now presenting to [facility] for non-healing LLE wound just above medial malleolus. He developed this scan 5 months ago, but 4 days ago it peeled off and developed surrounding erythema. He denies any rest pain in his feet at this time. He is reportedly supposed to follow up with his vascular interventionalist soon and is possible offered another angiogram. On exam, he has LLE woudn above medial malleolar region. Has scab and some fibrinous tissue. No jesus purulence. Surrounding cellulitis. Non-contrast CT scan read as cellulitis. Faintly palpable L PT pulse. Good dopplerable L DP/PT signals. Afebrile and HDS. WBC 9.79. Cr 0.65.       Today, LLE wound appears better with wet to drys. dry scab with underlying pink appearing tissue. No jesus pus. Still with some pain/tenderness around wound. Some mild LLE swelling. AM labs pending.  Afebrile and HDS.

## 2025-04-11 NOTE — DISCHARGE NOTE NURSING/CASE MANAGEMENT/SOCIAL WORK - FINANCIAL ASSISTANCE
[facility] provides services at a reduced cost to those who are determined to be eligible through [facility]’s financial assistance program. Information regarding [facility]’s financial assistance program can be found by going to [email or web address] or by calling [phone].

## 2025-04-11 NOTE — DISCHARGE NOTE PROVIDER - CARE PROVIDER_API CALL
Safia Calabrese  Pulmonary Disease  [address] [place] [address]  Phone: [phone]  Fax: [phone]  Established Patient  Follow Up Time:     Susana Coulter  Cardiac Electrophysiology  [address], [address]  [place] [address]  Phone: [phone]  Fax: [phone]  Established Patient  Follow Up Time:

## 2025-04-11 NOTE — DISCHARGE NOTE PROVIDER - CARE PROVIDERS DIRECT ADDRESSES
[email or web address]

## 2025-04-11 NOTE — DISCHARGE NOTE PROVIDER - PROVIDER TOKENS
PROVIDER:[TOKEN:[[id number]],ESTABLISHEDPATIENT:[T]],PROVIDER:[TOKEN:[[id number]],ESTABLISHEDPATIENT:[T]]

## 2025-04-15 LAB
CULTURE RESULTS: SIGNIFICANT CHANGE UP
CULTURE RESULTS: SIGNIFICANT CHANGE UP
SPECIMEN SOURCE: SIGNIFICANT CHANGE UP
SPECIMEN SOURCE: SIGNIFICANT CHANGE UP

## 2025-04-16 DIAGNOSIS — E78.5 HYPERLIPIDEMIA, UNSPECIFIED: ICD-10-CM

## 2025-04-16 DIAGNOSIS — L03.116 CELLULITIS OF LEFT LOWER LIMB: ICD-10-CM

## 2025-04-16 DIAGNOSIS — Z59.71 INSUFFICIENT HEALTH INSURANCE COVERAGE: ICD-10-CM

## 2025-04-16 DIAGNOSIS — E11.319 TYPE 2 DIABETES MELLITUS WITH UNSPECIFIED DIABETIC RETINOPATHY WITHOUT MACULAR EDEMA: ICD-10-CM
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
## 2025-04-16 DIAGNOSIS — Z79.02 LONG TERM (CURRENT) USE OF ANTITHROMBOTICS/ANTIPLATELETS: ICD-10-CM

## 2025-04-16 DIAGNOSIS — Z79.4 LONG TERM (CURRENT) USE OF INSULIN: ICD-10-CM

## 2025-04-16 DIAGNOSIS — Z79.82 LONG TERM (CURRENT) USE OF ASPIRIN: ICD-10-CM

## 2025-04-16 DIAGNOSIS — I65.23 OCCLUSION AND STENOSIS OF BILATERAL CAROTID ARTERIES: ICD-10-CM
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
## 2025-04-16 DIAGNOSIS — Z91.120 PATIENT'S INTENTIONAL UNDERDOSING OF MEDICATION REGIMEN DUE TO FINANCIAL HARDSHIP: ICD-10-CM

## 2025-04-16 DIAGNOSIS — Z95.1 PRESENCE OF AORTOCORONARY BYPASS GRAFT: ICD-10-CM

## 2025-04-16 DIAGNOSIS — Z95.5 PRESENCE OF CORONARY ANGIOPLASTY IMPLANT AND GRAFT: ICD-10-CM

## 2025-04-16 DIAGNOSIS — I10 ESSENTIAL (PRIMARY) HYPERTENSION: ICD-10-CM

## 2025-04-16 DIAGNOSIS — T45.1X6A UNDERDOSING OF ANTINEOPLASTIC AND IMMUNOSUPPRESSIVE DRUGS, INITIAL ENCOUNTER: ICD-10-CM
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
## 2025-04-16 DIAGNOSIS — I25.10 ATHEROSCLEROTIC HEART DISEASE OF NATIVE CORONARY ARTERY WITHOUT ANGINA PECTORIS: ICD-10-CM

## 2025-04-16 DIAGNOSIS — Z95.820 PERIPHERAL VASCULAR ANGIOPLASTY STATUS WITH IMPLANTS AND GRAFTS: ICD-10-CM

## 2025-04-16 DIAGNOSIS — Z79.620 LONG TERM (CURRENT) USE OF IMMUNOSUPPRESSIVE BIOLOGIC: ICD-10-CM

## 2025-04-16 DIAGNOSIS — E66.2 MORBID (SEVERE) OBESITY WITH ALVEOLAR HYPOVENTILATION: ICD-10-CM

## 2025-04-16 DIAGNOSIS — S91.002A UNSPECIFIED OPEN WOUND, LEFT ANKLE, INITIAL ENCOUNTER: ICD-10-CM

## 2025-04-16 DIAGNOSIS — E11.43 TYPE 2 DIABETES MELLITUS WITH DIABETIC AUTONOMIC (POLY)NEUROPATHY: ICD-10-CM

## 2025-04-16 DIAGNOSIS — I25.2 OLD MYOCARDIAL INFARCTION: ICD-10-CM

## 2025-04-28 ENCOUNTER — APPOINTMENT (OUTPATIENT)
Dept: VASCULAR SURGERY | Facility: CLINIC | Age: 60
End: 2025-04-28
Payer: MEDICARE

## 2025-04-28 ENCOUNTER — NON-APPOINTMENT (OUTPATIENT)
Age: 60
End: 2025-04-28

## 2025-04-28 VITALS
WEIGHT: 260 LBS | HEIGHT: 67 IN | BODY MASS INDEX: 40.81 KG/M2 | HEART RATE: 80 BPM | DIASTOLIC BLOOD PRESSURE: 81 MMHG | SYSTOLIC BLOOD PRESSURE: 167 MMHG

## 2025-04-28 DIAGNOSIS — S81.802A UNSPECIFIED OPEN WOUND, RIGHT LOWER LEG, INITIAL ENCOUNTER: ICD-10-CM

## 2025-04-28 DIAGNOSIS — S81.801A UNSPECIFIED OPEN WOUND, RIGHT LOWER LEG, INITIAL ENCOUNTER: ICD-10-CM

## 2025-04-28 PROCEDURE — 93971 EXTREMITY STUDY: CPT | Mod: LT

## 2025-04-28 PROCEDURE — 99214 OFFICE O/P EST MOD 30 MIN: CPT | Mod: 25

## 2025-05-12 ENCOUNTER — NON-APPOINTMENT (OUTPATIENT)
Age: 60
End: 2025-05-12

## 2025-05-12 ENCOUNTER — APPOINTMENT (OUTPATIENT)
Dept: VASCULAR SURGERY | Facility: CLINIC | Age: 60
End: 2025-05-12
Payer: MEDICARE

## 2025-05-12 VITALS
SYSTOLIC BLOOD PRESSURE: 126 MMHG | DIASTOLIC BLOOD PRESSURE: 79 MMHG | HEART RATE: 82 BPM | HEIGHT: 67 IN | BODY MASS INDEX: 40.81 KG/M2 | WEIGHT: 260 LBS

## 2025-05-12 PROCEDURE — 99214 OFFICE O/P EST MOD 30 MIN: CPT
